# Patient Record
Sex: MALE | Race: WHITE | NOT HISPANIC OR LATINO | Employment: FULL TIME | URBAN - METROPOLITAN AREA
[De-identification: names, ages, dates, MRNs, and addresses within clinical notes are randomized per-mention and may not be internally consistent; named-entity substitution may affect disease eponyms.]

---

## 2017-03-09 ENCOUNTER — PATIENT OUTREACH (OUTPATIENT)
Dept: FAMILY MEDICINE CLINIC | Facility: CLINIC | Age: 52
End: 2017-03-09

## 2017-04-16 ENCOUNTER — RESULTS ENCOUNTER (OUTPATIENT)
Dept: FAMILY MEDICINE CLINIC | Facility: CLINIC | Age: 52
End: 2017-04-16

## 2017-04-16 DIAGNOSIS — E78.49 OTHER HYPERLIPIDEMIA: ICD-10-CM

## 2017-04-16 DIAGNOSIS — E11.9 CONTROLLED TYPE 2 DIABETES MELLITUS WITHOUT COMPLICATION, WITHOUT LONG-TERM CURRENT USE OF INSULIN (HCC): ICD-10-CM

## 2017-04-16 DIAGNOSIS — I10 ESSENTIAL HYPERTENSION: ICD-10-CM

## 2017-05-11 ENCOUNTER — OFFICE VISIT (OUTPATIENT)
Dept: FAMILY MEDICINE CLINIC | Facility: CLINIC | Age: 52
End: 2017-05-11

## 2017-05-11 VITALS
HEIGHT: 69 IN | BODY MASS INDEX: 40.58 KG/M2 | RESPIRATION RATE: 16 BRPM | DIASTOLIC BLOOD PRESSURE: 78 MMHG | TEMPERATURE: 98.1 F | HEART RATE: 77 BPM | WEIGHT: 274 LBS | SYSTOLIC BLOOD PRESSURE: 116 MMHG

## 2017-05-11 DIAGNOSIS — G89.29 CHRONIC PAIN OF RIGHT KNEE: Primary | ICD-10-CM

## 2017-05-11 DIAGNOSIS — M25.561 CHRONIC PAIN OF RIGHT KNEE: Primary | ICD-10-CM

## 2017-05-11 DIAGNOSIS — I10 ESSENTIAL HYPERTENSION: ICD-10-CM

## 2017-05-11 DIAGNOSIS — E78.49 OTHER HYPERLIPIDEMIA: ICD-10-CM

## 2017-05-11 DIAGNOSIS — E11.9 CONTROLLED TYPE 2 DIABETES MELLITUS WITHOUT COMPLICATION, WITHOUT LONG-TERM CURRENT USE OF INSULIN (HCC): ICD-10-CM

## 2017-05-11 PROCEDURE — 99214 OFFICE O/P EST MOD 30 MIN: CPT | Performed by: FAMILY MEDICINE

## 2017-05-11 RX ORDER — LEVOTHYROXINE SODIUM 175 UG/1
175 TABLET ORAL DAILY
Qty: 90 TABLET | Refills: 1 | Status: SHIPPED | OUTPATIENT
Start: 2017-05-11 | End: 2017-11-15 | Stop reason: SDUPTHER

## 2017-05-11 RX ORDER — IRBESARTAN 300 MG/1
300 TABLET ORAL DAILY
Qty: 90 TABLET | Refills: 1 | Status: SHIPPED | OUTPATIENT
Start: 2017-05-11 | End: 2017-11-15 | Stop reason: SDUPTHER

## 2017-05-11 RX ORDER — METFORMIN HYDROCHLORIDE 500 MG/1
2000 TABLET, EXTENDED RELEASE ORAL
Qty: 360 TABLET | Refills: 1 | Status: SHIPPED | OUTPATIENT
Start: 2017-05-11 | End: 2017-11-15 | Stop reason: SDUPTHER

## 2017-05-11 RX ORDER — PIOGLITAZONEHYDROCHLORIDE 45 MG/1
45 TABLET ORAL DAILY
Qty: 90 TABLET | Refills: 1 | Status: SHIPPED | OUTPATIENT
Start: 2017-05-11 | End: 2017-11-15 | Stop reason: SDUPTHER

## 2017-05-11 RX ORDER — AMLODIPINE BESYLATE 10 MG/1
10 TABLET ORAL DAILY
Qty: 90 TABLET | Refills: 1 | Status: SHIPPED | OUTPATIENT
Start: 2017-05-11 | End: 2017-11-15 | Stop reason: SDUPTHER

## 2017-05-11 RX ORDER — ATORVASTATIN CALCIUM 40 MG/1
40 TABLET, FILM COATED ORAL DAILY
Qty: 30 TABLET | Refills: 5 | Status: SHIPPED | OUTPATIENT
Start: 2017-05-11 | End: 2017-11-15 | Stop reason: SDUPTHER

## 2017-06-29 ENCOUNTER — OFFICE VISIT (OUTPATIENT)
Dept: ORTHOPEDIC SURGERY | Facility: CLINIC | Age: 52
End: 2017-06-29

## 2017-06-29 VITALS — WEIGHT: 255 LBS | HEIGHT: 69 IN | TEMPERATURE: 98.9 F | BODY MASS INDEX: 37.77 KG/M2

## 2017-06-29 DIAGNOSIS — S89.91XA KNEE INJURIES, RIGHT, INITIAL ENCOUNTER: Primary | ICD-10-CM

## 2017-06-29 DIAGNOSIS — S83.241A TEAR OF MEDIAL MENISCUS OF RIGHT KNEE, CURRENT, UNSPECIFIED TEAR TYPE, INITIAL ENCOUNTER: ICD-10-CM

## 2017-06-29 PROCEDURE — 73562 X-RAY EXAM OF KNEE 3: CPT | Performed by: ORTHOPAEDIC SURGERY

## 2017-06-29 PROCEDURE — 99244 OFF/OP CNSLTJ NEW/EST MOD 40: CPT | Performed by: ORTHOPAEDIC SURGERY

## 2017-06-29 NOTE — PROGRESS NOTES
"   New Patient Complaint      Patient: Hal Alexandre  YOB: 1965 51 y.o. male  Medical Record Number: [id number]    Chief Complaints: My knee hurts     History of Present Illness:  patient injured right knee 22 years ago when someone fell against his knee while playing basketball \"bending sideways\".  He was seen at [facility] at that time and told me that he had an MRI but is not sure of the results.  Since then he has had episodes that are very intermittent but when they occur are severe wear the right knee \"goes out on him and feels like something is stuck.  This is used associated with swelling grinding and aching with painful popping.  He treats this by wedging his foot under something and pushing hard on his knee until he feels a pop in it feels better.  This occurred several days ago again when getting up quickly to answer a phone.  He is seen today at the request of Dr. Elliott Lopez who is requested my opinion regarding etiology and treatment of this condition        HPI    Allergies: No Known Allergies    Medications:   Current Outpatient Prescriptions on File Prior to Visit   Medication Sig   • amLODIPine (NORVASC) 10 MG tablet Take 1 tablet by mouth Daily for 180 days.   • aspirin 81 MG tablet Take 81 mg by mouth daily.   • atorvastatin (LIPITOR) 40 MG tablet Take 1 tablet by mouth Daily for 180 days.   • irbesartan (AVAPRO) 300 MG tablet Take 1 tablet by mouth Daily for 180 days.   • levothyroxine (SYNTHROID, LEVOTHROID) 175 MCG tablet Take 1 tablet by mouth Daily for 180 days.   • metFORMIN ER (GLUCOPHAGE-XR) 500 MG 24 hr tablet Take 4 tablets by mouth Daily With Breakfast for 180 days.   • pioglitazone (ACTOS) 45 MG tablet Take 1 tablet by mouth Daily for 180 days.     No current facility-administered medications on file prior to visit.        Past Medical History:   Diagnosis Date   • Diabetes mellitus    • Disease of thyroid gland    • Hyperlipidemia    • Hypertension  " "    History reviewed. No pertinent surgical history.  Social History     Occupational History   • Not on file.     Social History Main Topics   • Smoking status: Former Smoker   • Smokeless tobacco: Never Used   • Alcohol use Yes   • Drug use: Not on file   • Sexual activity: Not on file      Social History     Social History Narrative     Family History   Problem Relation Age of Onset   • Diabetes Mother    • Cancer Father    • Cancer Brother        Review of Systems: 14 point review of systems performed, positive pertinent findings identified in HPI. All remaining systems negative     Review of Systems      Physical Exam:   Vitals:    06/29/17 1511   Temp: 98.9 °F (37.2 °C)   Weight: 255 lb (116 kg)   Height: 69\" (175.3 cm)     Physical Exam   Constitutional: pleasant, well developed   Eyes: sclera non icteric  Hearing : adequate for exam  Cardiovascular: palpable pulses in foot, calf/ thigh NT without sign of DVT  Respiratoy: breathing unlabored   Neurological: grossly sensate to LT throughout LE  Psychiatric: oriented with normal mood and affect.   Lymphatic: No palpable popliteal lymphadenopathy  Skin: intact throughout leg/foot  Musculoskeletal: Nonantalgic gait moderate effusion right knee.  Full extension 110° of flexion.  Grossly stable ligamentous exam but I did not appreciate firm endpoint on anterior drawer and didn't seem to have a trace pivot glide.  Moderate discomfort over the medial joint line exacerbated with painful popping on Hui's.  No patellar instability or apprehension  Physical Exam  Ortho Exam    Radiology: 3 views of the right knee ordered to evaluate knee pain reviewed and there are no prior x-rays available for comparison did not see any fracture or malalignment to the joint line or patellofemoral joint.    Assessment/Plan: Right knee pain of unclear etiology.  By history and exam most likely has an unstable meniscal tear and may have chronic ACL injury.  I offered him a knee sleeve " or hinged neoprene brace today which he declined.  We need to get an MRI of his right knee for further evaluation and he understands to call to schedule follow-up appointment once MRI has been scheduled.  In the interim he'll avoid kneeling squatting or twisting activities

## 2017-10-08 ENCOUNTER — RESULTS ENCOUNTER (OUTPATIENT)
Dept: FAMILY MEDICINE CLINIC | Facility: CLINIC | Age: 52
End: 2017-10-08

## 2017-10-08 DIAGNOSIS — E78.49 OTHER HYPERLIPIDEMIA: ICD-10-CM

## 2017-10-08 DIAGNOSIS — E11.9 CONTROLLED TYPE 2 DIABETES MELLITUS WITHOUT COMPLICATION, WITHOUT LONG-TERM CURRENT USE OF INSULIN (HCC): ICD-10-CM

## 2017-10-08 DIAGNOSIS — I10 ESSENTIAL HYPERTENSION: ICD-10-CM

## 2017-11-14 LAB
ALBUMIN SERPL-MCNC: 4.5 G/DL (ref 3.5–5.5)
ALBUMIN/GLOB SERPL: 1.6 {RATIO} (ref 1.2–2.2)
ALP SERPL-CCNC: 108 IU/L (ref 39–117)
ALT SERPL-CCNC: 34 IU/L (ref 0–44)
AST SERPL-CCNC: 17 IU/L (ref 0–40)
BASOPHILS # BLD AUTO: 0.1 X10E3/UL (ref 0–0.2)
BASOPHILS NFR BLD AUTO: 1 %
BILIRUB SERPL-MCNC: 0.3 MG/DL (ref 0–1.2)
BUN SERPL-MCNC: 14 MG/DL (ref 6–24)
BUN/CREAT SERPL: 13 (ref 9–20)
CALCIUM SERPL-MCNC: 9.5 MG/DL (ref 8.7–10.2)
CHLORIDE SERPL-SCNC: 102 MMOL/L (ref 96–106)
CHOLEST SERPL-MCNC: 214 MG/DL (ref 100–199)
CO2 SERPL-SCNC: 23 MMOL/L (ref 18–29)
CREAT SERPL-MCNC: 1.06 MG/DL (ref 0.76–1.27)
EOSINOPHIL # BLD AUTO: 0.4 X10E3/UL (ref 0–0.4)
EOSINOPHIL NFR BLD AUTO: 6 %
ERYTHROCYTE [DISTWIDTH] IN BLOOD BY AUTOMATED COUNT: 13.6 % (ref 12.3–15.4)
GFR SERPLBLD CREATININE-BSD FMLA CKD-EPI: 81 ML/MIN/1.73
GFR SERPLBLD CREATININE-BSD FMLA CKD-EPI: 93 ML/MIN/1.73
GLOBULIN SER CALC-MCNC: 2.9 G/DL (ref 1.5–4.5)
GLUCOSE SERPL-MCNC: 172 MG/DL (ref 65–99)
HBA1C MFR BLD: 6.9 % (ref 4.8–5.6)
HCT VFR BLD AUTO: 43.8 % (ref 37.5–51)
HDLC SERPL-MCNC: 49 MG/DL
HGB BLD-MCNC: 14.9 G/DL (ref 12.6–17.7)
IMM GRANULOCYTES # BLD: 0 X10E3/UL (ref 0–0.1)
IMM GRANULOCYTES NFR BLD: 0 %
LDLC SERPL CALC-MCNC: 126 MG/DL (ref 0–99)
LDLC/HDLC SERPL: 2.6 RATIO UNITS (ref 0–3.6)
LYMPHOCYTES # BLD AUTO: 2.5 X10E3/UL (ref 0.7–3.1)
LYMPHOCYTES NFR BLD AUTO: 32 %
MCH RBC QN AUTO: 28 PG (ref 26.6–33)
MCHC RBC AUTO-ENTMCNC: 34 G/DL (ref 31.5–35.7)
MCV RBC AUTO: 82 FL (ref 79–97)
MICROALBUMIN UR-MCNC: 69.2 UG/ML
MONOCYTES # BLD AUTO: 0.8 X10E3/UL (ref 0.1–0.9)
MONOCYTES NFR BLD AUTO: 11 %
NEUTROPHILS # BLD AUTO: 4 X10E3/UL (ref 1.4–7)
NEUTROPHILS NFR BLD AUTO: 50 %
PLATELET # BLD AUTO: 399 X10E3/UL (ref 150–379)
POTASSIUM SERPL-SCNC: 5.3 MMOL/L (ref 3.5–5.2)
PROT SERPL-MCNC: 7.4 G/DL (ref 6–8.5)
RBC # BLD AUTO: 5.32 X10E6/UL (ref 4.14–5.8)
SODIUM SERPL-SCNC: 140 MMOL/L (ref 134–144)
TRIGL SERPL-MCNC: 193 MG/DL (ref 0–149)
TSH SERPL DL<=0.005 MIU/L-ACNC: 2.62 UIU/ML (ref 0.45–4.5)
VLDLC SERPL CALC-MCNC: 39 MG/DL (ref 5–40)
WBC # BLD AUTO: 7.9 X10E3/UL (ref 3.4–10.8)

## 2017-11-15 ENCOUNTER — OFFICE VISIT (OUTPATIENT)
Dept: FAMILY MEDICINE CLINIC | Facility: CLINIC | Age: 52
End: 2017-11-15

## 2017-11-15 VITALS
DIASTOLIC BLOOD PRESSURE: 105 MMHG | SYSTOLIC BLOOD PRESSURE: 158 MMHG | HEART RATE: 86 BPM | RESPIRATION RATE: 18 BRPM | HEIGHT: 69 IN | OXYGEN SATURATION: 97 % | TEMPERATURE: 97.9 F

## 2017-11-15 DIAGNOSIS — I10 ESSENTIAL HYPERTENSION: ICD-10-CM

## 2017-11-15 DIAGNOSIS — E78.49 OTHER HYPERLIPIDEMIA: ICD-10-CM

## 2017-11-15 DIAGNOSIS — E11.9 CONTROLLED TYPE 2 DIABETES MELLITUS WITHOUT COMPLICATION, WITHOUT LONG-TERM CURRENT USE OF INSULIN (HCC): ICD-10-CM

## 2017-11-15 PROCEDURE — 90471 IMMUNIZATION ADMIN: CPT | Performed by: NURSE PRACTITIONER

## 2017-11-15 PROCEDURE — 99214 OFFICE O/P EST MOD 30 MIN: CPT | Performed by: NURSE PRACTITIONER

## 2017-11-15 PROCEDURE — 90686 IIV4 VACC NO PRSV 0.5 ML IM: CPT | Performed by: NURSE PRACTITIONER

## 2017-11-15 RX ORDER — METFORMIN HYDROCHLORIDE 500 MG/1
2000 TABLET, EXTENDED RELEASE ORAL
Qty: 360 TABLET | Refills: 1 | Status: SHIPPED | OUTPATIENT
Start: 2017-11-15 | End: 2018-05-15 | Stop reason: SDUPTHER

## 2017-11-15 RX ORDER — PIOGLITAZONEHYDROCHLORIDE 45 MG/1
45 TABLET ORAL DAILY
Qty: 90 TABLET | Refills: 1 | Status: SHIPPED | OUTPATIENT
Start: 2017-11-15 | End: 2018-05-15 | Stop reason: SDUPTHER

## 2017-11-15 RX ORDER — EZETIMIBE 10 MG/1
10 TABLET ORAL DAILY
Qty: 90 TABLET | Refills: 1 | Status: SHIPPED | OUTPATIENT
Start: 2017-11-15 | End: 2018-05-15 | Stop reason: ALTCHOICE

## 2017-11-15 RX ORDER — ATORVASTATIN CALCIUM 40 MG/1
40 TABLET, FILM COATED ORAL DAILY
Qty: 90 TABLET | Refills: 1 | Status: SHIPPED | OUTPATIENT
Start: 2017-11-15 | End: 2018-05-15 | Stop reason: SDUPTHER

## 2017-11-15 RX ORDER — IRBESARTAN 300 MG/1
300 TABLET ORAL DAILY
Qty: 90 TABLET | Refills: 1 | Status: SHIPPED | OUTPATIENT
Start: 2017-11-15 | End: 2018-05-15 | Stop reason: SDUPTHER

## 2017-11-15 RX ORDER — AMLODIPINE BESYLATE 10 MG/1
10 TABLET ORAL DAILY
Qty: 90 TABLET | Refills: 1 | Status: SHIPPED | OUTPATIENT
Start: 2017-11-15 | End: 2018-05-15 | Stop reason: SDUPTHER

## 2017-11-15 RX ORDER — LEVOTHYROXINE SODIUM 175 UG/1
175 TABLET ORAL DAILY
Qty: 90 TABLET | Refills: 1 | Status: SHIPPED | OUTPATIENT
Start: 2017-11-15 | End: 2018-05-15 | Stop reason: SDUPTHER

## 2017-11-15 NOTE — PROGRESS NOTES
"Subjective   Hal Alexandre is a 52 y.o. male.     History of Present Illness   Chief Complaint:   Chief Complaint   Patient presents with   • Hyperlipidemia     fill at [facility]   • Hypertension     med refill, send rest to [facility], review labs   • Hypothyroidism   • Diabetes   • Flu Vaccine       Hal Alexandre 52 y.o. male who presents today for Medical Management of the below listed issues and medication refills.  he has a problem list of   Patient Active Problem List   Diagnosis   • Essential hypertension   • Hyperlipidemia   • Acquired hypothyroidism   • Diabetes mellitus type 2, controlled   • Chronic pain of right knee   • Tear of medial meniscus of right knee, current   .  Since the last visit, he has overall felt well.  he has been compliant with   Current Outpatient Prescriptions:   •  amLODIPine (NORVASC) 10 MG tablet, Take 1 tablet by mouth Daily for 180 days., Disp: 90 tablet, Rfl: 1  •  aspirin 81 MG tablet, Take 81 mg by mouth daily., Disp: , Rfl:   •  atorvastatin (LIPITOR) 40 MG tablet, Take 1 tablet by mouth Daily for 180 days., Disp: 90 tablet, Rfl: 1  •  ezetimibe (ZETIA) 10 MG tablet, Take 1 tablet by mouth Daily., Disp: 90 tablet, Rfl: 1  •  irbesartan (AVAPRO) 300 MG tablet, Take 1 tablet by mouth Daily for 180 days., Disp: 90 tablet, Rfl: 1  •  levothyroxine (SYNTHROID, LEVOTHROID) 175 MCG tablet, Take 1 tablet by mouth Daily for 180 days., Disp: 90 tablet, Rfl: 1  •  metFORMIN ER (GLUCOPHAGE-XR) 500 MG 24 hr tablet, Take 4 tablets by mouth Daily With Breakfast for 180 days., Disp: 360 tablet, Rfl: 1  •  pioglitazone (ACTOS) 45 MG tablet, Take 1 tablet by mouth Daily for 180 days., Disp: 90 tablet, Rfl: 1.  he denies medication side effects.    All of the chronic condition(s) listed above are stable w/o issues.    BP (!) 158/105  Pulse 86  Temp 97.9 °F (36.6 °C)  Resp 18  Ht 69\" (175.3 cm)  SpO2 97%    Results for orders placed or performed in visit on 10/08/17   Comprehensive metabolic " panel   Result Value Ref Range    Glucose 172 (H) 65 - 99 mg/dL    BUN 14 6 - 24 mg/dL    Creatinine 1.06 0.76 - 1.27 mg/dL    eGFR Non African Am 81 >59 mL/min/1.73    eGFR African Am 93 >59 mL/min/1.73    BUN/Creatinine Ratio 13 9 - 20    Sodium 140 134 - 144 mmol/L    Potassium 5.3 (H) 3.5 - 5.2 mmol/L    Chloride 102 96 - 106 mmol/L    Total CO2 23 18 - 29 mmol/L    Calcium 9.5 8.7 - 10.2 mg/dL    Total Protein 7.4 6.0 - 8.5 g/dL    Albumin 4.5 3.5 - 5.5 g/dL    Globulin 2.9 1.5 - 4.5 g/dL    A/G Ratio 1.6 1.2 - 2.2    Total Bilirubin 0.3 0.0 - 1.2 mg/dL    Alkaline Phosphatase 108 39 - 117 IU/L    AST (SGOT) 17 0 - 40 IU/L    ALT (SGPT) 34 0 - 44 IU/L   Lipid Panel With LDL/HDL Ratio   Result Value Ref Range    Total Cholesterol 214 (H) 100 - 199 mg/dL    Triglycerides 193 (H) 0 - 149 mg/dL    HDL Cholesterol 49 >39 mg/dL    VLDL Cholesterol 39 5 - 40 mg/dL    LDL Cholesterol  126 (H) 0 - 99 mg/dL    LDL/HDL Ratio 2.6 0.0 - 3.6 ratio units   TSH   Result Value Ref Range    TSH 2.620 0.450 - 4.500 uIU/mL   Hemoglobin A1c   Result Value Ref Range    Hemoglobin A1C 6.9 (H) 4.8 - 5.6 %   MicroAlbumin, Urine, Random   Result Value Ref Range    Microalbumin, Urine 69.2 Not Estab. ug/mL   CBC and Differential   Result Value Ref Range    WBC 7.9 3.4 - 10.8 x10E3/uL    RBC 5.32 4.14 - 5.80 x10E6/uL    Hemoglobin 14.9 12.6 - 17.7 g/dL    Hematocrit 43.8 37.5 - 51.0 %    MCV 82 79 - 97 fL    MCH 28.0 26.6 - 33.0 pg    MCHC 34.0 31.5 - 35.7 g/dL    RDW 13.6 12.3 - 15.4 %    Platelets 399 (H) 150 - 379 x10E3/uL    Neutrophil Rel % 50 Not Estab. %    Lymphocyte Rel % 32 Not Estab. %    Monocyte Rel % 11 Not Estab. %    Eosinophil Rel % 6 Not Estab. %    Basophil Rel % 1 Not Estab. %    Neutrophils Absolute 4.0 1.4 - 7.0 x10E3/uL    Lymphocytes Absolute 2.5 0.7 - 3.1 x10E3/uL    Monocytes Absolute 0.8 0.1 - 0.9 x10E3/uL    Eosinophils Absolute 0.4 0.0 - 0.4 x10E3/uL    Basophils Absolute 0.1 0.0 - 0.2 x10E3/uL    Immature  Granulocyte Rel % 0 Not Estab. %    Immature Grans Absolute 0.0 0.0 - 0.1 x10E3/uL       Patient has been out of BP medication.  He has also gained weight as he had meniscus tear. Patient states he is to have surgery.  LDL has increased.  He has still been taking atorvastatin as prescribed and has not been out of medication.   The following portions of the patient's history were reviewed and updated as appropriate: allergies, current medications, past family history, past medical history, past social history, past surgical history and problem list.    Review of Systems   Constitutional: Negative for fatigue.   Eyes: Negative for visual disturbance.   Respiratory: Negative for cough and shortness of breath.    Cardiovascular: Negative for chest pain and palpitations.   Skin: Negative for rash.   Neurological: Negative for dizziness and headaches.   Psychiatric/Behavioral: Negative for dysphoric mood and sleep disturbance. The patient is not nervous/anxious.        Objective   Physical Exam   Constitutional: He is oriented to person, place, and time. He appears well-developed and well-nourished.   Neck: Carotid bruit is not present.   Cardiovascular: Normal rate and regular rhythm.    Pulmonary/Chest: Effort normal and breath sounds normal.   Neurological: He is oriented to person, place, and time.   Skin: Skin is warm and dry.   Psychiatric: He has a normal mood and affect. His behavior is normal. Judgment and thought content normal.   Nursing note and vitals reviewed.      Assessment/Plan   Hal was seen today for hyperlipidemia, hypertension, hypothyroidism, diabetes and flu vaccine.    Diagnoses and all orders for this visit:    Essential hypertension  -     amLODIPine (NORVASC) 10 MG tablet; Take 1 tablet by mouth Daily for 180 days.  -     irbesartan (AVAPRO) 300 MG tablet; Take 1 tablet by mouth Daily for 180 days.  -     metFORMIN ER (GLUCOPHAGE-XR) 500 MG 24 hr tablet; Take 4 tablets by mouth Daily With  Breakfast for 180 days.  -     pioglitazone (ACTOS) 45 MG tablet; Take 1 tablet by mouth Daily for 180 days.  -     levothyroxine (SYNTHROID, LEVOTHROID) 175 MCG tablet; Take 1 tablet by mouth Daily for 180 days.  -     ezetimibe (ZETIA) 10 MG tablet; Take 1 tablet by mouth Daily.    Other hyperlipidemia  -     amLODIPine (NORVASC) 10 MG tablet; Take 1 tablet by mouth Daily for 180 days.  -     irbesartan (AVAPRO) 300 MG tablet; Take 1 tablet by mouth Daily for 180 days.  -     metFORMIN ER (GLUCOPHAGE-XR) 500 MG 24 hr tablet; Take 4 tablets by mouth Daily With Breakfast for 180 days.  -     pioglitazone (ACTOS) 45 MG tablet; Take 1 tablet by mouth Daily for 180 days.  -     levothyroxine (SYNTHROID, LEVOTHROID) 175 MCG tablet; Take 1 tablet by mouth Daily for 180 days.  -     ezetimibe (ZETIA) 10 MG tablet; Take 1 tablet by mouth Daily.  -     atorvastatin (LIPITOR) 40 MG tablet; Take 1 tablet by mouth Daily for 180 days.    Controlled type 2 diabetes mellitus without complication, without long-term current use of insulin  -     amLODIPine (NORVASC) 10 MG tablet; Take 1 tablet by mouth Daily for 180 days.  -     irbesartan (AVAPRO) 300 MG tablet; Take 1 tablet by mouth Daily for 180 days.  -     metFORMIN ER (GLUCOPHAGE-XR) 500 MG 24 hr tablet; Take 4 tablets by mouth Daily With Breakfast for 180 days.  -     pioglitazone (ACTOS) 45 MG tablet; Take 1 tablet by mouth Daily for 180 days.  -     levothyroxine (SYNTHROID, LEVOTHROID) 175 MCG tablet; Take 1 tablet by mouth Daily for 180 days.  -     ezetimibe (ZETIA) 10 MG tablet; Take 1 tablet by mouth Daily.    Other orders  -     Flu Vaccine Quad PF 3YR+ (FLUARIX/FLUZONE [id number])    Added zetia for better control of LDL without increasing risk of myalgia.

## 2017-12-11 ENCOUNTER — TELEPHONE (OUTPATIENT)
Dept: FAMILY MEDICINE CLINIC | Facility: CLINIC | Age: 52
End: 2017-12-11

## 2017-12-11 DIAGNOSIS — E78.2 MIXED HYPERLIPIDEMIA: Primary | ICD-10-CM

## 2017-12-11 RX ORDER — CHOLESTYRAMINE LIGHT 4 G/5.7G
4 POWDER, FOR SUSPENSION ORAL 2 TIMES DAILY
Qty: 60 PACKET | Refills: 5 | Status: SHIPPED | OUTPATIENT
Start: 2017-12-11 | End: 2018-11-06

## 2017-12-11 NOTE — TELEPHONE ENCOUNTER
----- Message from SOFIA Spangler sent at 12/8/2017 12:56 PM EST -----  Regarding: zetia denial  zetia not covered even though patient is on atorvastatin and not at goal.  Need to change zetia to cholestyramine powder.

## 2017-12-18 ENCOUNTER — PRIOR AUTHORIZATION (OUTPATIENT)
Dept: FAMILY MEDICINE CLINIC | Facility: CLINIC | Age: 52
End: 2017-12-18

## 2017-12-18 NOTE — TELEPHONE ENCOUNTER
PA request for generic Zetia denied; Patient has to have one of the following: Dx of Homozygous Sitosterolemia; Dx of Mixed Hyperlipedmia; Have tried and failed statin within last 90 days; Appeal in process

## 2018-04-29 DIAGNOSIS — E78.49 OTHER HYPERLIPIDEMIA: ICD-10-CM

## 2018-04-30 RX ORDER — ATORVASTATIN CALCIUM 40 MG/1
TABLET, FILM COATED ORAL
Qty: 90 TABLET | Refills: 0 | OUTPATIENT
Start: 2018-04-30

## 2018-05-15 ENCOUNTER — OFFICE VISIT (OUTPATIENT)
Dept: FAMILY MEDICINE CLINIC | Facility: CLINIC | Age: 53
End: 2018-05-15

## 2018-05-15 VITALS
HEART RATE: 64 BPM | RESPIRATION RATE: 18 BRPM | SYSTOLIC BLOOD PRESSURE: 128 MMHG | TEMPERATURE: 97.9 F | WEIGHT: 255 LBS | DIASTOLIC BLOOD PRESSURE: 78 MMHG | OXYGEN SATURATION: 97 % | HEIGHT: 69 IN | BODY MASS INDEX: 37.77 KG/M2

## 2018-05-15 DIAGNOSIS — E11.9 CONTROLLED TYPE 2 DIABETES MELLITUS WITHOUT COMPLICATION, WITHOUT LONG-TERM CURRENT USE OF INSULIN (HCC): ICD-10-CM

## 2018-05-15 DIAGNOSIS — E78.49 OTHER HYPERLIPIDEMIA: ICD-10-CM

## 2018-05-15 DIAGNOSIS — Z12.11 ENCOUNTER FOR COLORECTAL CANCER SCREENING: Primary | ICD-10-CM

## 2018-05-15 DIAGNOSIS — Z12.12 ENCOUNTER FOR COLORECTAL CANCER SCREENING: Primary | ICD-10-CM

## 2018-05-15 DIAGNOSIS — I10 ESSENTIAL HYPERTENSION: ICD-10-CM

## 2018-05-15 PROCEDURE — 99214 OFFICE O/P EST MOD 30 MIN: CPT | Performed by: NURSE PRACTITIONER

## 2018-05-15 RX ORDER — METFORMIN HYDROCHLORIDE 500 MG/1
2000 TABLET, EXTENDED RELEASE ORAL
Qty: 360 TABLET | Refills: 1 | Status: SHIPPED | OUTPATIENT
Start: 2018-05-15 | End: 2018-11-06 | Stop reason: SDUPTHER

## 2018-05-15 RX ORDER — IRBESARTAN 300 MG/1
300 TABLET ORAL DAILY
Qty: 90 TABLET | Refills: 1 | Status: SHIPPED | OUTPATIENT
Start: 2018-05-15 | End: 2019-02-15 | Stop reason: SDUPTHER

## 2018-05-15 RX ORDER — ATORVASTATIN CALCIUM 40 MG/1
40 TABLET, FILM COATED ORAL DAILY
Qty: 90 TABLET | Refills: 1 | Status: SHIPPED | OUTPATIENT
Start: 2018-05-15 | End: 2018-11-06 | Stop reason: SDUPTHER

## 2018-05-15 RX ORDER — PIOGLITAZONEHYDROCHLORIDE 45 MG/1
45 TABLET ORAL DAILY
Qty: 90 TABLET | Refills: 1 | Status: SHIPPED | OUTPATIENT
Start: 2018-05-15 | End: 2018-11-06

## 2018-05-15 RX ORDER — LEVOTHYROXINE SODIUM 175 UG/1
175 TABLET ORAL DAILY
Qty: 90 TABLET | Refills: 1 | Status: SHIPPED | OUTPATIENT
Start: 2018-05-15 | End: 2018-11-06

## 2018-05-15 RX ORDER — AMLODIPINE BESYLATE 10 MG/1
10 TABLET ORAL DAILY
Qty: 90 TABLET | Refills: 1 | Status: SHIPPED | OUTPATIENT
Start: 2018-05-15 | End: 2018-11-06 | Stop reason: SDUPTHER

## 2018-05-15 NOTE — PROGRESS NOTES
Subjective   Hal Alexandre is a 52 y.o. male.     History of Present Illness   Hal Alexandre 52 y.o. male who presents today for routine follow up check and medication refills.  he has a history of   Patient Active Problem List   Diagnosis   • Essential hypertension   • Hyperlipidemia   • Acquired hypothyroidism   • Diabetes mellitus type 2, controlled   • Chronic pain of right knee   • Tear of medial meniscus of right knee, current   .  Since the last visit, he has overall felt well.  He has Hypertenision and is well controlled on medication, DMII and is well controlled on medication, Hyperlipidemia and is well controlled on medication and Hypothyroidism and is well controlled on Rx.  Labs are in desired treatment range.  he has been compliant with current medications have reviewed them.  The patient denies medication side effects.    Results for orders placed or performed in visit on 10/08/17   Comprehensive metabolic panel   Result Value Ref Range    Glucose 172 (H) 65 - 99 mg/dL    BUN 14 6 - 24 mg/dL    Creatinine 1.06 0.76 - 1.27 mg/dL    eGFR Non African Am 81 >59 mL/min/1.73    eGFR African Am 93 >59 mL/min/1.73    BUN/Creatinine Ratio 13 9 - 20    Sodium 140 134 - 144 mmol/L    Potassium 5.3 (H) 3.5 - 5.2 mmol/L    Chloride 102 96 - 106 mmol/L    Total CO2 23 18 - 29 mmol/L    Calcium 9.5 8.7 - 10.2 mg/dL    Total Protein 7.4 6.0 - 8.5 g/dL    Albumin 4.5 3.5 - 5.5 g/dL    Globulin 2.9 1.5 - 4.5 g/dL    A/G Ratio 1.6 1.2 - 2.2    Total Bilirubin 0.3 0.0 - 1.2 mg/dL    Alkaline Phosphatase 108 39 - 117 IU/L    AST (SGOT) 17 0 - 40 IU/L    ALT (SGPT) 34 0 - 44 IU/L   Lipid Panel With LDL/HDL Ratio   Result Value Ref Range    Total Cholesterol 214 (H) 100 - 199 mg/dL    Triglycerides 193 (H) 0 - 149 mg/dL    HDL Cholesterol 49 >39 mg/dL    VLDL Cholesterol 39 5 - 40 mg/dL    LDL Cholesterol  126 (H) 0 - 99 mg/dL    LDL/HDL Ratio 2.6 0.0 - 3.6 ratio units   TSH   Result Value Ref Range    TSH 2.620 0.450 -  4.500 uIU/mL   Hemoglobin A1c   Result Value Ref Range    Hemoglobin A1C 6.9 (H) 4.8 - 5.6 %   MicroAlbumin, Urine, Random   Result Value Ref Range    Microalbumin, Urine 69.2 Not Estab. ug/mL   CBC and Differential   Result Value Ref Range    WBC 7.9 3.4 - 10.8 x10E3/uL    RBC 5.32 4.14 - 5.80 x10E6/uL    Hemoglobin 14.9 12.6 - 17.7 g/dL    Hematocrit 43.8 37.5 - 51.0 %    MCV 82 79 - 97 fL    MCH 28.0 26.6 - 33.0 pg    MCHC 34.0 31.5 - 35.7 g/dL    RDW 13.6 12.3 - 15.4 %    Platelets 399 (H) 150 - 379 x10E3/uL    Neutrophil Rel % 50 Not Estab. %    Lymphocyte Rel % 32 Not Estab. %    Monocyte Rel % 11 Not Estab. %    Eosinophil Rel % 6 Not Estab. %    Basophil Rel % 1 Not Estab. %    Neutrophils Absolute 4.0 1.4 - 7.0 x10E3/uL    Lymphocytes Absolute 2.5 0.7 - 3.1 x10E3/uL    Monocytes Absolute 0.8 0.1 - 0.9 x10E3/uL    Eosinophils Absolute 0.4 0.0 - 0.4 x10E3/uL    Basophils Absolute 0.1 0.0 - 0.2 x10E3/uL    Immature Granulocyte Rel % 0 Not Estab. %    Immature Grans Absolute 0.0 0.0 - 0.1 x10E3/uL     Due for labs.  Will get done this week.   The following portions of the patient's history were reviewed and updated as appropriate: allergies, current medications, past family history, past medical history, past social history, past surgical history and problem list.    Review of Systems   Constitutional: Negative for fatigue.   Respiratory: Negative for cough and shortness of breath.    Cardiovascular: Negative for chest pain and palpitations.   Endocrine: Negative for cold intolerance, heat intolerance, polydipsia, polyphagia and polyuria.   Skin: Negative for rash.   Psychiatric/Behavioral: Negative for dysphoric mood and sleep disturbance. The patient is not nervous/anxious.        Objective   Physical Exam   Constitutional: He is oriented to person, place, and time. He appears well-developed and well-nourished.   Neck: Carotid bruit is not present.   Cardiovascular: Normal rate and regular rhythm.     Pulmonary/Chest: Effort normal and breath sounds normal.   Neurological: He is oriented to person, place, and time.   Skin: Skin is warm and dry.   Psychiatric: He has a normal mood and affect. His behavior is normal. Judgment and thought content normal.   Nursing note and vitals reviewed.      Assessment/Plan   Problems Addressed this Visit        Cardiovascular and Mediastinum    Essential hypertension    Relevant Medications    atorvastatin (LIPITOR) 40 MG tablet    amLODIPine (NORVASC) 10 MG tablet    irbesartan (AVAPRO) 300 MG tablet    levothyroxine (SYNTHROID, LEVOTHROID) 175 MCG tablet    metFORMIN ER (GLUCOPHAGE-XR) 500 MG 24 hr tablet    pioglitazone (ACTOS) 45 MG tablet    Other Relevant Orders    Comprehensive metabolic panel    Lipid panel    CBC and Differential    TSH    Hemoglobin A1c    MicroAlbumin, Urine, Random - Urine, Clean Catch    Hyperlipidemia    Relevant Medications    atorvastatin (LIPITOR) 40 MG tablet    amLODIPine (NORVASC) 10 MG tablet    irbesartan (AVAPRO) 300 MG tablet    levothyroxine (SYNTHROID, LEVOTHROID) 175 MCG tablet    metFORMIN ER (GLUCOPHAGE-XR) 500 MG 24 hr tablet    pioglitazone (ACTOS) 45 MG tablet    Other Relevant Orders    Comprehensive metabolic panel    Lipid panel    CBC and Differential    TSH    Hemoglobin A1c    MicroAlbumin, Urine, Random - Urine, Clean Catch       Endocrine    Diabetes mellitus type 2, controlled    Relevant Medications    atorvastatin (LIPITOR) 40 MG tablet    amLODIPine (NORVASC) 10 MG tablet    irbesartan (AVAPRO) 300 MG tablet    levothyroxine (SYNTHROID, LEVOTHROID) 175 MCG tablet    metFORMIN ER (GLUCOPHAGE-XR) 500 MG 24 hr tablet    pioglitazone (ACTOS) 45 MG tablet    Other Relevant Orders    Comprehensive metabolic panel    Lipid panel    CBC and Differential    TSH    Hemoglobin A1c    MicroAlbumin, Urine, Random - Urine, Clean Catch      Other Visit Diagnoses     Encounter for colorectal cancer screening    -  Primary     Relevant Orders    Ambulatory Referral For Screening Colonoscopy

## 2018-11-03 LAB
ALBUMIN SERPL-MCNC: 4.2 G/DL (ref 3.5–5.5)
ALBUMIN/GLOB SERPL: 1.4 {RATIO} (ref 1.2–2.2)
ALP SERPL-CCNC: 108 IU/L (ref 39–117)
ALT SERPL-CCNC: 25 IU/L (ref 0–44)
AST SERPL-CCNC: 17 IU/L (ref 0–40)
BASOPHILS # BLD AUTO: 0 X10E3/UL (ref 0–0.2)
BASOPHILS NFR BLD AUTO: 1 %
BILIRUB SERPL-MCNC: 0.4 MG/DL (ref 0–1.2)
BUN SERPL-MCNC: 16 MG/DL (ref 6–24)
BUN/CREAT SERPL: 15 (ref 9–20)
CALCIUM SERPL-MCNC: 9.9 MG/DL (ref 8.7–10.2)
CHLORIDE SERPL-SCNC: 105 MMOL/L (ref 96–106)
CHOLEST SERPL-MCNC: 147 MG/DL (ref 100–199)
CO2 SERPL-SCNC: 23 MMOL/L (ref 20–29)
CREAT SERPL-MCNC: 1.09 MG/DL (ref 0.76–1.27)
EOSINOPHIL # BLD AUTO: 0.3 X10E3/UL (ref 0–0.4)
EOSINOPHIL NFR BLD AUTO: 5 %
ERYTHROCYTE [DISTWIDTH] IN BLOOD BY AUTOMATED COUNT: 14.1 % (ref 12.3–15.4)
GLOBULIN SER CALC-MCNC: 3 G/DL (ref 1.5–4.5)
GLUCOSE SERPL-MCNC: 110 MG/DL (ref 65–99)
HBA1C MFR BLD: 5.9 % (ref 4.8–5.6)
HCT VFR BLD AUTO: 46.1 % (ref 37.5–51)
HDLC SERPL-MCNC: 37 MG/DL
HGB BLD-MCNC: 15 G/DL (ref 13–17.7)
IMM GRANULOCYTES # BLD: 0 X10E3/UL (ref 0–0.1)
IMM GRANULOCYTES NFR BLD: 0 %
LDLC SERPL CALC-MCNC: 80 MG/DL (ref 0–99)
LYMPHOCYTES # BLD AUTO: 2.2 X10E3/UL (ref 0.7–3.1)
LYMPHOCYTES NFR BLD AUTO: 33 %
MCH RBC QN AUTO: 28.2 PG (ref 26.6–33)
MCHC RBC AUTO-ENTMCNC: 32.5 G/DL (ref 31.5–35.7)
MCV RBC AUTO: 87 FL (ref 79–97)
MICROALBUMIN UR-MCNC: 6.6 UG/ML
MONOCYTES # BLD AUTO: 0.7 X10E3/UL (ref 0.1–0.9)
MONOCYTES NFR BLD AUTO: 11 %
NEUTROPHILS # BLD AUTO: 3.2 X10E3/UL (ref 1.4–7)
NEUTROPHILS NFR BLD AUTO: 50 %
PLATELET # BLD AUTO: 351 X10E3/UL (ref 150–379)
POTASSIUM SERPL-SCNC: 5.2 MMOL/L (ref 3.5–5.2)
PROT SERPL-MCNC: 7.2 G/DL (ref 6–8.5)
RBC # BLD AUTO: 5.32 X10E6/UL (ref 4.14–5.8)
SODIUM SERPL-SCNC: 143 MMOL/L (ref 134–144)
TRIGL SERPL-MCNC: 148 MG/DL (ref 0–149)
TSH SERPL DL<=0.005 MIU/L-ACNC: 0.39 UIU/ML (ref 0.45–4.5)
VLDLC SERPL CALC-MCNC: 30 MG/DL (ref 5–40)
WBC # BLD AUTO: 6.6 X10E3/UL (ref 3.4–10.8)

## 2018-11-06 ENCOUNTER — OFFICE VISIT (OUTPATIENT)
Dept: FAMILY MEDICINE CLINIC | Facility: CLINIC | Age: 53
End: 2018-11-06

## 2018-11-06 VITALS
DIASTOLIC BLOOD PRESSURE: 72 MMHG | WEIGHT: 256 LBS | HEART RATE: 61 BPM | SYSTOLIC BLOOD PRESSURE: 110 MMHG | OXYGEN SATURATION: 96 % | BODY MASS INDEX: 37.92 KG/M2 | TEMPERATURE: 98.4 F | RESPIRATION RATE: 16 BRPM | HEIGHT: 69 IN

## 2018-11-06 DIAGNOSIS — I10 ESSENTIAL HYPERTENSION: ICD-10-CM

## 2018-11-06 DIAGNOSIS — E11.9 CONTROLLED TYPE 2 DIABETES MELLITUS WITHOUT COMPLICATION, WITHOUT LONG-TERM CURRENT USE OF INSULIN (HCC): ICD-10-CM

## 2018-11-06 DIAGNOSIS — Z23 IMMUNIZATION DUE: ICD-10-CM

## 2018-11-06 DIAGNOSIS — Z12.5 SCREENING PSA (PROSTATE SPECIFIC ANTIGEN): ICD-10-CM

## 2018-11-06 DIAGNOSIS — E78.49 OTHER HYPERLIPIDEMIA: ICD-10-CM

## 2018-11-06 DIAGNOSIS — E11.9 CONTROLLED TYPE 2 DIABETES MELLITUS WITHOUT COMPLICATION, WITH LONG-TERM CURRENT USE OF INSULIN (HCC): Primary | ICD-10-CM

## 2018-11-06 DIAGNOSIS — Z79.4 CONTROLLED TYPE 2 DIABETES MELLITUS WITHOUT COMPLICATION, WITH LONG-TERM CURRENT USE OF INSULIN (HCC): Primary | ICD-10-CM

## 2018-11-06 DIAGNOSIS — Z12.11 COLON CANCER SCREENING: ICD-10-CM

## 2018-11-06 DIAGNOSIS — E03.9 ACQUIRED HYPOTHYROIDISM: ICD-10-CM

## 2018-11-06 DIAGNOSIS — E78.2 MIXED HYPERLIPIDEMIA: ICD-10-CM

## 2018-11-06 LAB
Lab: NORMAL
T3 SERPL-MCNC: 106 NG/DL (ref 71–180)
T4 FREE SERPL-MCNC: 1.87 NG/DL (ref 0.82–1.77)
WRITTEN AUTHORIZATION: NORMAL

## 2018-11-06 PROCEDURE — 99214 OFFICE O/P EST MOD 30 MIN: CPT | Performed by: PHYSICIAN ASSISTANT

## 2018-11-06 PROCEDURE — 90471 IMMUNIZATION ADMIN: CPT | Performed by: PHYSICIAN ASSISTANT

## 2018-11-06 PROCEDURE — 90674 CCIIV4 VAC NO PRSV 0.5 ML IM: CPT | Performed by: PHYSICIAN ASSISTANT

## 2018-11-06 RX ORDER — LEVOTHYROXINE SODIUM 0.15 MG/1
150 TABLET ORAL DAILY
Qty: 90 TABLET | Refills: 3 | Status: SHIPPED | OUTPATIENT
Start: 2018-11-06 | End: 2019-11-19 | Stop reason: SDUPTHER

## 2018-11-06 RX ORDER — METFORMIN HYDROCHLORIDE 500 MG/1
2000 TABLET, EXTENDED RELEASE ORAL DAILY
Qty: 360 TABLET | Refills: 1 | Status: SHIPPED | OUTPATIENT
Start: 2018-11-06 | End: 2019-05-02 | Stop reason: SDUPTHER

## 2018-11-06 RX ORDER — PIOGLITAZONEHYDROCHLORIDE 15 MG/1
15 TABLET ORAL DAILY
Qty: 90 TABLET | Refills: 3 | Status: SHIPPED | OUTPATIENT
Start: 2018-11-06 | End: 2019-11-19 | Stop reason: SDUPTHER

## 2018-11-06 RX ORDER — ATORVASTATIN CALCIUM 40 MG/1
40 TABLET, FILM COATED ORAL DAILY
Qty: 90 TABLET | Refills: 1 | Status: SHIPPED | OUTPATIENT
Start: 2018-11-06 | End: 2019-05-02 | Stop reason: SDUPTHER

## 2018-11-06 RX ORDER — AMLODIPINE BESYLATE 10 MG/1
10 TABLET ORAL DAILY
Qty: 90 TABLET | Refills: 1 | Status: SHIPPED | OUTPATIENT
Start: 2018-11-06 | End: 2019-05-02 | Stop reason: SDUPTHER

## 2018-11-06 NOTE — PATIENT INSTRUCTIONS
Labs 3mos  appt 6mos  Low glycemic index diet  Exercise 30 minutes most days of the week  Make sure you get results on any labs or tests we ordered today  We discussed medications and how to take them as prescribed  Sleep 6-8 hours each night if possible  If you have not signed up for MyChart, please activate your code ASAP from your After Visit Summary today    LDL goal <100  LDL goal if heart disease <70  HDL goal >60  Triglyceride goal <150  BP goal =<130/80  Fasting glucose <100

## 2018-11-06 NOTE — PROGRESS NOTES
Subjective   Hal Alexandre is a 53 y.o. male.     History of Present Illness     Hal Alexandre 53 y.o. male who presents today for routine follow up check and medication refills.  he has a history of   Patient Active Problem List   Diagnosis   • Essential hypertension   • Hyperlipidemia   • Acquired hypothyroidism   • Controlled type 2 diabetes mellitus without complication, with long-term current use of insulin (CMS/[facility])   • Chronic pain of right knee   • Tear of medial meniscus of right knee, current   .  Since the last visit, he has overall felt well.  He has Hypertenision and is well controlled on medication, DMII and is well controlled on medication and Hypothyroidism and here to discuss abnormal lab results.  he has been compliant with current medications have reviewed them.  The patient denies medication side effects.  Weight was 296  Results for orders placed or performed in visit on 05/15/18   Comprehensive metabolic panel   Result Value Ref Range    Glucose 110 (H) 65 - 99 mg/dL    BUN 16 6 - 24 mg/dL    Creatinine 1.09 0.76 - 1.27 mg/dL    eGFR Non African Am 78 >59 mL/min/1.73    eGFR African Am 90 >59 mL/min/1.73    BUN/Creatinine Ratio 15 9 - 20    Sodium 143 134 - 144 mmol/L    Potassium 5.2 3.5 - 5.2 mmol/L    Chloride 105 96 - 106 mmol/L    Total CO2 23 20 - 29 mmol/L    Calcium 9.9 8.7 - 10.2 mg/dL    Total Protein 7.2 6.0 - 8.5 g/dL    Albumin 4.2 3.5 - 5.5 g/dL    Globulin 3.0 1.5 - 4.5 g/dL    A/G Ratio 1.4 1.2 - 2.2    Total Bilirubin 0.4 0.0 - 1.2 mg/dL    Alkaline Phosphatase 108 39 - 117 IU/L    AST (SGOT) 17 0 - 40 IU/L    ALT (SGPT) 25 0 - 44 IU/L   Lipid panel   Result Value Ref Range    Total Cholesterol 147 100 - 199 mg/dL    Triglycerides 148 0 - 149 mg/dL    HDL Cholesterol 37 (L) >39 mg/dL    VLDL Cholesterol 30 5 - 40 mg/dL    LDL Cholesterol  80 0 - 99 mg/dL   TSH   Result Value Ref Range    TSH 0.385 (L) 0.450 - 4.500 uIU/mL   Hemoglobin A1c   Result Value Ref Range    Hemoglobin  A1C 5.9 (H) 4.8 - 5.6 %   MicroAlbumin, Urine, Random - Urine, Clean Catch   Result Value Ref Range    Microalbumin, Urine 6.6 Not Estab. ug/mL   T4, Free   Result Value Ref Range    Free T4 1.87 (H) 0.82 - 1.77 ng/dL   T3   Result Value Ref Range    T3, Total 106 71 - 180 ng/dL   Please Note   Result Value Ref Range    Please note Comment    Written Authorization   Result Value Ref Range    Written Authorization Comment    CBC and Differential   Result Value Ref Range    WBC 6.6 3.4 - 10.8 x10E3/uL    RBC 5.32 4.14 - 5.80 x10E6/uL    Hemoglobin 15.0 13.0 - 17.7 g/dL    Hematocrit 46.1 37.5 - 51.0 %    MCV 87 79 - 97 fL    MCH 28.2 26.6 - 33.0 pg    MCHC 32.5 31.5 - 35.7 g/dL    RDW 14.1 12.3 - 15.4 %    Platelets 351 150 - 379 x10E3/uL    Neutrophil Rel % 50 Not Estab. %    Lymphocyte Rel % 33 Not Estab. %    Monocyte Rel % 11 Not Estab. %    Eosinophil Rel % 5 Not Estab. %    Basophil Rel % 1 Not Estab. %    Neutrophils Absolute 3.2 1.4 - 7.0 x10E3/uL    Lymphocytes Absolute 2.2 0.7 - 3.1 x10E3/uL    Monocytes Absolute 0.7 0.1 - 0.9 x10E3/uL    Eosinophils Absolute 0.3 0.0 - 0.4 x10E3/uL    Basophils Absolute 0.0 0.0 - 0.2 x10E3/uL    Immature Granulocyte Rel % 0 Not Estab. %    Immature Grans Absolute 0.0 0.0 - 0.1 x10E3/uL     Brother prostate cancer  Needs colonoscopy and making order  Need to exercise more    Down about 65 lbs and watching diet  I will lower thyroid dose and let him go down on Actos with his weight loss and see what 15 mg does;  Wants to see if   Labs for these and PSA in 3mos    The following portions of the patient's history were reviewed and updated as appropriate: allergies, current medications, past family history, past medical history, past social history, past surgical history and problem list.    Review of Systems   Constitutional: Negative for activity change, appetite change and unexpected weight change.   HENT: Negative for nosebleeds and trouble swallowing.    Eyes: Negative for  pain and visual disturbance.   Respiratory: Negative for chest tightness, shortness of breath and wheezing.    Cardiovascular: Negative for chest pain and palpitations.   Gastrointestinal: Negative for abdominal pain and blood in stool.   Endocrine: Negative.    Genitourinary: Negative for difficulty urinating and hematuria.   Musculoskeletal: Negative for joint swelling.   Skin: Negative for color change and rash.   Allergic/Immunologic: Negative.    Neurological: Negative for syncope and speech difficulty.   Hematological: Negative for adenopathy.   Psychiatric/Behavioral: Negative for agitation and confusion.   All other systems reviewed and are negative.      Objective   Physical Exam   Constitutional: He is oriented to person, place, and time. He appears well-developed and well-nourished. No distress.   HENT:   Head: Normocephalic and atraumatic.   Eyes: Pupils are equal, round, and reactive to light. Conjunctivae and EOM are normal. Right eye exhibits no discharge. Left eye exhibits no discharge. No scleral icterus.   Neck: Normal range of motion. Neck supple. No tracheal deviation present. No thyromegaly present.   Cardiovascular: Normal rate, regular rhythm, normal heart sounds, intact distal pulses and normal pulses.  Exam reveals no gallop.    No murmur heard.  Trace pedal edema = bilat     Pulmonary/Chest: Effort normal and breath sounds normal. No respiratory distress. He has no wheezes. He has no rales.   Musculoskeletal: Normal range of motion.    Hal had a diabetic foot exam performed today.   During the foot exam he had a monofilament test performed.    Neurological Sensory Findings - Unaltered hot/cold right ankle/foot discrimination and unaltered hot/cold left ankle/foot discrimination. Unaltered sharp/dull right ankle/foot discrimination and unaltered sharp/dull left ankle/foot discrimination. No right ankle/foot altered proprioception and no left ankle/foot altered proprioception  Vascular  Status -  His right foot exhibits normal foot vasculature . His left foot exhibits normal foot vasculature .  Skin Integrity  -  His right foot skin is intact.  He has no right foot onychomycosis, no right foot ulcer, non-callous right foot, no ingrown toenail on right foot, right heel is not dry and cracked, no right foot warmth, no right foot blister and no right foot gangrenous changes.His left foot skin is intact. He has no left foot onychomycosis, no left foot ulcer, non-callous left foot, no left ingrown toenail, no left heel dry and cracked, no left foot warmth, no left foot blister and no left foot gangrenous changes..     Neurological: He is alert and oriented to person, place, and time. He exhibits normal muscle tone. Coordination normal.   Skin: Skin is warm. No rash noted. No erythema. No pallor.   Psychiatric: He has a normal mood and affect. His behavior is normal. Judgment and thought content normal.   Nursing note and vitals reviewed.      Assessment/Plan   Problems Addressed this Visit        Cardiovascular and Mediastinum    Essential hypertension    Relevant Medications    metFORMIN ER (GLUCOPHAGE-XR) 500 MG 24 hr tablet    atorvastatin (LIPITOR) 40 MG tablet    amLODIPine (NORVASC) 10 MG tablet    Other Relevant Orders    PSA Screen    Comprehensive Metabolic Panel    Hemoglobin A1c    T3, Free    T4, Free    TSH    Vitamin B12    Folate    Vitamin D 25 Hydroxy    Hyperlipidemia    Relevant Medications    metFORMIN ER (GLUCOPHAGE-XR) 500 MG 24 hr tablet    atorvastatin (LIPITOR) 40 MG tablet    amLODIPine (NORVASC) 10 MG tablet    Other Relevant Orders    PSA Screen    Comprehensive Metabolic Panel    Hemoglobin A1c    T3, Free    T4, Free    TSH    Vitamin B12    Folate    Vitamin D 25 Hydroxy    PSA Screen    Comprehensive Metabolic Panel    Hemoglobin A1c    T3, Free    T4, Free    TSH    Vitamin B12    Folate    Vitamin D 25 Hydroxy       Endocrine    Acquired hypothyroidism    Relevant  Medications    levothyroxine (SYNTHROID) 150 MCG tablet    Other Relevant Orders    PSA Screen    Comprehensive Metabolic Panel    Hemoglobin A1c    T3, Free    T4, Free    TSH    Vitamin B12    Folate    Vitamin D 25 Hydroxy    Controlled type 2 diabetes mellitus without complication, with long-term current use of insulin (CMS/HCC) - Primary    Relevant Medications    pioglitazone (ACTOS) 15 MG tablet    metFORMIN ER (GLUCOPHAGE-XR) 500 MG 24 hr tablet    atorvastatin (LIPITOR) 40 MG tablet    amLODIPine (NORVASC) 10 MG tablet    Other Relevant Orders    PSA Screen    Comprehensive Metabolic Panel    Hemoglobin A1c    T3, Free    T4, Free    TSH    Vitamin B12    Folate    Vitamin D 25 Hydroxy      Other Visit Diagnoses     Immunization due        Relevant Orders    Flucelvax Quad=>4Years (PFS) (Completed)    Colon cancer screening        Relevant Orders    Ambulatory Referral to Gastroenterology    PSA Screen    Comprehensive Metabolic Panel    Hemoglobin A1c    T3, Free    T4, Free    TSH    Vitamin B12    Folate    Vitamin D 25 Hydroxy    Controlled type 2 diabetes mellitus without complication, without long-term current use of insulin (CMS/HCC)        Relevant Medications    pioglitazone (ACTOS) 15 MG tablet    metFORMIN ER (GLUCOPHAGE-XR) 500 MG 24 hr tablet    atorvastatin (LIPITOR) 40 MG tablet    amLODIPine (NORVASC) 10 MG tablet    Other Relevant Orders    PSA Screen    Comprehensive Metabolic Panel    Hemoglobin A1c    T3, Free    T4, Free    TSH    Vitamin B12    Folate    Vitamin D 25 Hydroxy    Screening PSA (prostate specific antigen)        Relevant Orders    PSA Screen    Comprehensive Metabolic Panel    Hemoglobin A1c    T3, Free    T4, Free    TSH    Vitamin B12    Folate    Vitamin D 25 Hydroxy

## 2018-12-13 ENCOUNTER — PREP FOR SURGERY (OUTPATIENT)
Dept: OTHER | Facility: HOSPITAL | Age: 53
End: 2018-12-13

## 2018-12-13 DIAGNOSIS — Z12.11 SCREENING FOR MALIGNANT NEOPLASM OF COLON: Primary | ICD-10-CM

## 2018-12-13 RX ORDER — SODIUM CHLORIDE, SODIUM LACTATE, POTASSIUM CHLORIDE, CALCIUM CHLORIDE 600; 310; 30; 20 MG/100ML; MG/100ML; MG/100ML; MG/100ML
30 INJECTION, SOLUTION INTRAVENOUS CONTINUOUS
Status: CANCELLED | OUTPATIENT
Start: 2019-02-04

## 2019-01-07 ENCOUNTER — OFFICE VISIT (OUTPATIENT)
Dept: FAMILY MEDICINE CLINIC | Facility: CLINIC | Age: 54
End: 2019-01-07

## 2019-01-07 VITALS
HEART RATE: 76 BPM | HEIGHT: 69 IN | DIASTOLIC BLOOD PRESSURE: 80 MMHG | OXYGEN SATURATION: 97 % | RESPIRATION RATE: 16 BRPM | BODY MASS INDEX: 37.8 KG/M2 | SYSTOLIC BLOOD PRESSURE: 114 MMHG | TEMPERATURE: 97.6 F

## 2019-01-07 DIAGNOSIS — M79.645 PAIN OF FINGER OF LEFT HAND: Primary | ICD-10-CM

## 2019-01-07 PROBLEM — Z12.11 SCREENING FOR MALIGNANT NEOPLASM OF COLON: Status: ACTIVE | Noted: 2019-01-07

## 2019-01-07 PROCEDURE — 73140 X-RAY EXAM OF FINGER(S): CPT | Performed by: PHYSICIAN ASSISTANT

## 2019-01-07 PROCEDURE — 99213 OFFICE O/P EST LOW 20 MIN: CPT | Performed by: PHYSICIAN ASSISTANT

## 2019-01-07 RX ORDER — MELOXICAM 15 MG/1
15 TABLET ORAL DAILY
Qty: 30 TABLET | Refills: 2 | Status: SHIPPED | OUTPATIENT
Start: 2019-01-07 | End: 2019-05-02

## 2019-01-07 NOTE — PROGRESS NOTES
Subjective   Hal Alexandre is a 53 y.o. male.     History of Present Illness   Patient complains of left middle finger pain. The symptoms began 4-5 weeks.  Pain is a result of no known event. Pain is located left third finger PIP region. Discomfort is described as aching and stiffness. Symptoms are exacerbated by movement and more pain in AM; splints finger at night.  Evaluation to date: none. Therapy to date includes: nothing specific    Edema of the PIP also; sore to touch; not red  I will get Xray and refer to hand doc  Lab Results   Component Value Date    BUN 16 11/02/2018    CREATININE 1.09 11/02/2018    EGFRIFNONA 78 11/02/2018    EGFRIFAFRI 90 11/02/2018    BCR 15 11/02/2018    K 5.2 11/02/2018    CO2 23 11/02/2018    CALCIUM 9.9 11/02/2018    PROTENTOTREF 7.2 11/02/2018    ALBUMIN 4.2 11/02/2018    LABIL2 1.4 11/02/2018    AST 17 11/02/2018    ALT 25 11/02/2018     X-Ray  Interpretation report in house X-rays that I personally viewed    Relevant Clinical Issues/Diagnoses/Indications:  3rd finger pain        Clinical Findings:  No fx or bone erosion          Comparative Data:  none          Date of Previous X-ray:    Change on current X-ray:    Stop NSAID if GI upset or any signs tarry or blood in stools    Splint as need    The following portions of the patient's history were reviewed and updated as appropriate: allergies, current medications, past family history, past medical history, past social history, past surgical history and problem list.    Review of Systems   Constitutional: Negative for activity change, appetite change and unexpected weight change.   HENT: Negative for nosebleeds and trouble swallowing.    Eyes: Negative for pain and visual disturbance.   Respiratory: Negative for chest tightness, shortness of breath and wheezing.    Cardiovascular: Negative for chest pain and palpitations.   Gastrointestinal: Negative for abdominal pain and blood in stool.   Endocrine: Negative.    Genitourinary:  Negative for difficulty urinating and hematuria.   Musculoskeletal: Positive for arthralgias. Negative for joint swelling.   Skin: Negative for color change and rash.   Allergic/Immunologic: Negative.    Neurological: Negative for syncope and speech difficulty.   Hematological: Negative for adenopathy.   Psychiatric/Behavioral: Negative for agitation and confusion.   All other systems reviewed and are negative.      Objective   Physical Exam   Constitutional: He is oriented to person, place, and time. He appears well-developed and well-nourished. No distress.   HENT:   Head: Normocephalic.   Eyes: EOM are normal. Pupils are equal, round, and reactive to light.   Cardiovascular:   No murmur heard.  Pulmonary/Chest: Effort normal.   Musculoskeletal: He exhibits edema and tenderness. He exhibits no deformity.   Left DIP 3rd finger sore and edema; limited ROM d/t pain; no palp mass; pulses 2+=   Neurological: He is alert and oriented to person, place, and time.   Skin: He is not diaphoretic.   Psychiatric: He has a normal mood and affect. His behavior is normal. Judgment and thought content normal. His affect is not inappropriate. He is not actively hallucinating. Cognition and memory are normal.   Nursing note and vitals reviewed.      Assessment/Plan   Hal was seen today for hand pain.    Diagnoses and all orders for this visit:    Pain of finger of left hand  -     XR Finger 2+ View Left (In Office)  -     Ambulatory Referral to Hand Surgery    Other orders  -     meloxicam (MOBIC) 15 MG tablet; Take 1 tablet by mouth Daily. For pain with food (stop if GI upset)

## 2019-02-04 ENCOUNTER — ANESTHESIA EVENT (OUTPATIENT)
Dept: GASTROENTEROLOGY | Facility: HOSPITAL | Age: 54
End: 2019-02-04

## 2019-02-04 ENCOUNTER — HOSPITAL ENCOUNTER (OUTPATIENT)
Facility: HOSPITAL | Age: 54
Setting detail: HOSPITAL OUTPATIENT SURGERY
Discharge: HOME | End: 2019-02-04
Attending: INTERNAL MEDICINE | Admitting: INTERNAL MEDICINE

## 2019-02-04 ENCOUNTER — ANESTHESIA (OUTPATIENT)
Dept: GASTROENTEROLOGY | Facility: HOSPITAL | Age: 54
End: 2019-02-04

## 2019-02-04 VITALS
TEMPERATURE: 97.6 F | BODY MASS INDEX: 36.49 KG/M2 | WEIGHT: 246.38 LBS | SYSTOLIC BLOOD PRESSURE: 115 MMHG | RESPIRATION RATE: 20 BRPM | OXYGEN SATURATION: 93 % | HEART RATE: 67 BPM | DIASTOLIC BLOOD PRESSURE: 71 MMHG | HEIGHT: 69 IN

## 2019-02-04 DIAGNOSIS — Z12.11 SCREENING FOR MALIGNANT NEOPLASM OF COLON: ICD-10-CM

## 2019-02-04 LAB — GLUCOSE BLDC GLUCOMTR-MCNC: 94 MG/DL (ref 70–130)

## 2019-02-04 PROCEDURE — 82962 GLUCOSE BLOOD TEST: CPT

## 2019-02-04 PROCEDURE — 25010000002 PROPOFOL 10 MG/ML EMULSION: Performed by: ANESTHESIOLOGY

## 2019-02-04 PROCEDURE — 45378 DIAGNOSTIC COLONOSCOPY: CPT | Performed by: INTERNAL MEDICINE

## 2019-02-04 RX ORDER — LIDOCAINE HYDROCHLORIDE 20 MG/ML
INJECTION, SOLUTION INFILTRATION; PERINEURAL AS NEEDED
Status: DISCONTINUED | OUTPATIENT
Start: 2019-02-04 | End: 2019-02-04 | Stop reason: SURG

## 2019-02-04 RX ORDER — PROPOFOL 10 MG/ML
VIAL (ML) INTRAVENOUS CONTINUOUS PRN
Status: DISCONTINUED | OUTPATIENT
Start: 2019-02-04 | End: 2019-02-04 | Stop reason: SURG

## 2019-02-04 RX ORDER — SODIUM CHLORIDE, SODIUM LACTATE, POTASSIUM CHLORIDE, CALCIUM CHLORIDE 600; 310; 30; 20 MG/100ML; MG/100ML; MG/100ML; MG/100ML
30 INJECTION, SOLUTION INTRAVENOUS CONTINUOUS
Status: DISCONTINUED | OUTPATIENT
Start: 2019-02-04 | End: 2019-02-04 | Stop reason: HOSPADM

## 2019-02-04 RX ORDER — PROPOFOL 10 MG/ML
VIAL (ML) INTRAVENOUS AS NEEDED
Status: DISCONTINUED | OUTPATIENT
Start: 2019-02-04 | End: 2019-02-04 | Stop reason: SURG

## 2019-02-04 RX ADMIN — PROPOFOL 100 MCG/KG/MIN: 10 INJECTION, EMULSION INTRAVENOUS at 09:55

## 2019-02-04 RX ADMIN — SODIUM CHLORIDE, POTASSIUM CHLORIDE, SODIUM LACTATE AND CALCIUM CHLORIDE: 600; 310; 30; 20 INJECTION, SOLUTION INTRAVENOUS at 09:55

## 2019-02-04 RX ADMIN — LIDOCAINE HYDROCHLORIDE 30 MG: 20 INJECTION, SOLUTION INFILTRATION; PERINEURAL at 09:55

## 2019-02-04 RX ADMIN — SODIUM CHLORIDE, POTASSIUM CHLORIDE, SODIUM LACTATE AND CALCIUM CHLORIDE 30 ML/HR: 600; 310; 30; 20 INJECTION, SOLUTION INTRAVENOUS at 09:34

## 2019-02-04 RX ADMIN — PROPOFOL 150 MG: 10 INJECTION, EMULSION INTRAVENOUS at 09:55

## 2019-02-04 NOTE — ANESTHESIA PREPROCEDURE EVALUATION
Anesthesia Evaluation     no history of anesthetic complications:               Airway   Mallampati: II  TM distance: >3 FB  Neck ROM: full  Dental - normal exam     Comment: One loose lower front tooth    Pulmonary    (+) a smoker Former,   (-) recent URI, sleep apnea  Cardiovascular     (+) hypertension, hyperlipidemia,   (-) dysrhythmias, angina      Neuro/Psych  GI/Hepatic/Renal/Endo    (+)   diabetes mellitus, hypothyroidism,   (-) hepatitis, liver disease, no renal disease    Musculoskeletal     Abdominal    Substance History      OB/GYN          Other                      Anesthesia Plan    ASA 2     MAC     intravenous induction   Anesthetic plan, all risks, benefits, and alternatives have been provided, discussed and informed consent has been obtained with: patient.

## 2019-02-04 NOTE — H&P
[facility], [place], operated by [facility]  Pre Procedure History & Physical    Chief Complaint:   Time for my colonoscopy    Subjective     HPI:   53 y.o. male presenting for average risk screening.  No prior cscope.  No current GI issues.  No family hx of colon cancer or polyps.        Past Medical History:   Past Medical History:   Diagnosis Date   • Diabetes mellitus (CMS/HCC)    • Disease of thyroid gland    • Hyperlipidemia    • Hypertension        Family History:  Family History   Problem Relation Age of Onset   • Diabetes Mother    • Cancer Father    • Cancer Brother        Social History:   reports that he quit smoking about 22 years ago. His smoking use included cigarettes. He has a 10.00 pack-year smoking history. he has never used smokeless tobacco. He reports that he drinks alcohol. He reports that he does not use drugs.    Medications:   Medications Prior to Admission   Medication Sig Dispense Refill Last Dose   • amLODIPine (NORVASC) 10 MG tablet Take 1 tablet by mouth Daily for 180 days. For BP 90 tablet 1 2/3/2019 at Unknown time   • atorvastatin (LIPITOR) 40 MG tablet Take 1 tablet by mouth Daily for 180 days. For BP 90 tablet 1 2/3/2019 at Unknown time   • irbesartan (AVAPRO) 300 MG tablet Take 1 tablet by mouth Daily for 180 days. 90 tablet 1 2/3/2019 at Unknown time   • levothyroxine (SYNTHROID) 150 MCG tablet Take 1 tablet by mouth Daily. For thyroid; new dose 90 tablet 3 2/3/2019 at Unknown time   • meloxicam (MOBIC) 15 MG tablet Take 1 tablet by mouth Daily. For pain with food (stop if GI upset) 30 tablet 2 2/3/2019 at Unknown time   • metFORMIN ER (GLUCOPHAGE-XR) 500 MG 24 hr tablet Take 4 tablets by mouth Daily for 180 days. With food for DMII 360 tablet 1 2/3/2019 at Unknown time   • pioglitazone (ACTOS) 15 MG tablet Take 1 tablet by mouth Daily. For DMII (new dose) 90 tablet 3 2/3/2019 at Unknown time   • aspirin 81 MG tablet Take 81 mg by mouth daily.   Taking       Allergies:  Patient has no  "known allergies.    ROS:    Pertinent items are noted in HPI     Objective     Blood pressure 131/77, pulse 77, temperature 98.2 °F (36.8 °C), temperature source Oral, resp. rate 16, height 175.3 cm (69\"), weight 112 kg (246 lb 6 oz), SpO2 93 %.    Physical Exam   Constitutional: Pt is oriented to person, place, and time and well-developed, well-nourished, and in no distress.   HENT:   Mouth/Throat: Oropharynx is clear and moist.   Neck: Normal range of motion. Neck supple.   Cardiovascular: Normal rate, regular rhythm and normal heart sounds.    Pulmonary/Chest: Effort normal and breath sounds normal. No respiratory distress. No  wheezes.   Abdominal: Soft. Bowel sounds are normal.   Skin: Skin is warm and dry.   Psychiatric: Mood, memory, affect and judgment normal.     Assessment/Plan     Diagnosis:  Encounter for screening for colon cancer    Anticipated Surgical Procedure:  Colonoscopy    The risks, benefits, and alternatives of this procedure have been discussed with the patient or the responsible party- the patient understands and agrees to proceed.                                                                "

## 2019-02-04 NOTE — ANESTHESIA POSTPROCEDURE EVALUATION
Patient: Hal Alexandre    Procedure Summary     Date:  02/04/19 Room / Location:   [facility] 10 /  [facility]    Anesthesia Start:  0959 Anesthesia Stop:  1025    Procedure:  COLONOSCOPY INTO CECUM AND TI (N/A ) Diagnosis:       Screening for malignant neoplasm of colon      (Screening for malignant neoplasm of colon [Z12.11])    Surgeon:  Hong Collazo MD Provider:  Anisha Asif MD    Anesthesia Type:  MAC ASA Status:  2          Anesthesia Type: MAC  Last vitals  BP   108/72 (02/04/19 1019)   Temp   36.4 °C (97.6 °F) (02/04/19 1019)   Pulse   77 (02/04/19 1019)   Resp   20 (02/04/19 1019)     SpO2   96 % (02/04/19 1019)     Post Anesthesia Care and Evaluation    Patient location during evaluation: PHASE II  Level of consciousness: awake and alert  Anesthetic complications: No anesthetic complications

## 2019-02-15 DIAGNOSIS — E78.49 OTHER HYPERLIPIDEMIA: ICD-10-CM

## 2019-02-15 DIAGNOSIS — E11.9 CONTROLLED TYPE 2 DIABETES MELLITUS WITHOUT COMPLICATION, WITHOUT LONG-TERM CURRENT USE OF INSULIN (HCC): ICD-10-CM

## 2019-02-15 DIAGNOSIS — I10 ESSENTIAL HYPERTENSION: ICD-10-CM

## 2019-02-18 RX ORDER — IRBESARTAN 300 MG/1
TABLET ORAL
Qty: 90 TABLET | Refills: 1 | Status: SHIPPED | OUTPATIENT
Start: 2019-02-18 | End: 2019-05-02 | Stop reason: SDUPTHER

## 2019-05-02 ENCOUNTER — OFFICE VISIT (OUTPATIENT)
Dept: FAMILY MEDICINE CLINIC | Facility: CLINIC | Age: 54
End: 2019-05-02

## 2019-05-02 VITALS
BODY MASS INDEX: 36.43 KG/M2 | SYSTOLIC BLOOD PRESSURE: 130 MMHG | HEART RATE: 81 BPM | HEIGHT: 69 IN | RESPIRATION RATE: 16 BRPM | DIASTOLIC BLOOD PRESSURE: 74 MMHG | TEMPERATURE: 98.6 F | OXYGEN SATURATION: 98 % | WEIGHT: 246 LBS

## 2019-05-02 DIAGNOSIS — Z79.4 CONTROLLED TYPE 2 DIABETES MELLITUS WITHOUT COMPLICATION, WITH LONG-TERM CURRENT USE OF INSULIN (HCC): Primary | ICD-10-CM

## 2019-05-02 DIAGNOSIS — I10 ESSENTIAL HYPERTENSION: ICD-10-CM

## 2019-05-02 DIAGNOSIS — E11.9 CONTROLLED TYPE 2 DIABETES MELLITUS WITHOUT COMPLICATION, WITHOUT LONG-TERM CURRENT USE OF INSULIN (HCC): ICD-10-CM

## 2019-05-02 DIAGNOSIS — E78.49 OTHER HYPERLIPIDEMIA: ICD-10-CM

## 2019-05-02 DIAGNOSIS — E11.9 CONTROLLED TYPE 2 DIABETES MELLITUS WITHOUT COMPLICATION, WITH LONG-TERM CURRENT USE OF INSULIN (HCC): Primary | ICD-10-CM

## 2019-05-02 DIAGNOSIS — M25.542 ARTHRALGIA OF BOTH HANDS: ICD-10-CM

## 2019-05-02 DIAGNOSIS — M25.541 ARTHRALGIA OF BOTH HANDS: ICD-10-CM

## 2019-05-02 DIAGNOSIS — E03.9 ACQUIRED HYPOTHYROIDISM: ICD-10-CM

## 2019-05-02 PROCEDURE — 96372 THER/PROPH/DIAG INJ SC/IM: CPT | Performed by: NURSE PRACTITIONER

## 2019-05-02 PROCEDURE — 99214 OFFICE O/P EST MOD 30 MIN: CPT | Performed by: NURSE PRACTITIONER

## 2019-05-02 RX ORDER — KETOROLAC TROMETHAMINE 30 MG/ML
30 INJECTION, SOLUTION INTRAMUSCULAR; INTRAVENOUS ONCE
Status: COMPLETED | OUTPATIENT
Start: 2019-05-02 | End: 2019-05-02

## 2019-05-02 RX ORDER — AMLODIPINE BESYLATE 10 MG/1
10 TABLET ORAL DAILY
Qty: 90 TABLET | Refills: 1 | Status: SHIPPED | OUTPATIENT
Start: 2019-05-02 | End: 2019-11-19 | Stop reason: SDUPTHER

## 2019-05-02 RX ORDER — ATORVASTATIN CALCIUM 40 MG/1
40 TABLET, FILM COATED ORAL DAILY
Qty: 90 TABLET | Refills: 1 | Status: SHIPPED | OUTPATIENT
Start: 2019-05-02 | End: 2019-11-20 | Stop reason: SDUPTHER

## 2019-05-02 RX ORDER — METHYLPREDNISOLONE 4 MG/1
TABLET ORAL
Qty: 21 TABLET | Refills: 0 | Status: SHIPPED | OUTPATIENT
Start: 2019-05-02 | End: 2019-07-10

## 2019-05-02 RX ORDER — IRBESARTAN 300 MG/1
300 TABLET ORAL DAILY
Qty: 90 TABLET | Refills: 1 | Status: SHIPPED | OUTPATIENT
Start: 2019-05-02 | End: 2019-12-30 | Stop reason: SDUPTHER

## 2019-05-02 RX ORDER — METFORMIN HYDROCHLORIDE 500 MG/1
2000 TABLET, EXTENDED RELEASE ORAL DAILY
Qty: 360 TABLET | Refills: 1 | Status: SHIPPED | OUTPATIENT
Start: 2019-05-02 | End: 2019-11-19 | Stop reason: SDUPTHER

## 2019-05-02 RX ADMIN — KETOROLAC TROMETHAMINE 30 MG: 30 INJECTION, SOLUTION INTRAMUSCULAR; INTRAVENOUS at 16:28

## 2019-05-02 NOTE — PROGRESS NOTES
Subjective   Hal Alexandre is a 53 y.o. male.     History of Present Illness   Hal Alexandre 53 y.o. male who presents today for routine follow up check and medication refills.  he has a history of   Patient Active Problem List   Diagnosis   • Essential hypertension   • Hyperlipidemia   • Acquired hypothyroidism   • Controlled type 2 diabetes mellitus without complication, with long-term current use of insulin (CMS/[facility])   • Chronic pain of right knee   • Tear of medial meniscus of right knee, current   • Screening for malignant neoplasm of colon   .  Since the last visit, he has overall felt well.  He has Hypertenision and is well controlled on medication, DMII and is well controlled on medication, Hyperlipidemia and is well controlled on medication and Hypothyroidism and is well controlled on Rx.  Labs are in desired treatment range.  he has been compliant with current medications have reviewed them.  The patient denies medication side effects.    Results for orders placed or performed during the hospital encounter of 02/04/19   POC Glucose Once   Result Value Ref Range    Glucose 94 70 - 130 mg/dL     Patient reports pain in bilateral hands for weeks but that his pain is primarily in his left 3rd finger.  Reports decreased flexion and some swelling.  Denies redness or warmth. Denies hx of gout.   The following portions of the patient's history were reviewed and updated as appropriate: allergies, current medications, past family history, past medical history, past social history, past surgical history and problem list.    Review of Systems   Constitutional: Negative for fatigue.   Respiratory: Negative for cough and shortness of breath.    Cardiovascular: Negative for chest pain and palpitations.   Endocrine: Negative for cold intolerance, heat intolerance, polydipsia, polyphagia and polyuria.   Musculoskeletal: Positive for arthralgias and joint swelling.   Skin: Negative for rash.   Psychiatric/Behavioral:  Negative for dysphoric mood and sleep disturbance. The patient is not nervous/anxious.        Objective   Physical Exam   Constitutional: He is oriented to person, place, and time. He appears well-developed and well-nourished.   Neck: Carotid bruit is not present.   Cardiovascular: Normal rate and regular rhythm.   Pulmonary/Chest: Effort normal and breath sounds normal.   Musculoskeletal:        Left hand: He exhibits decreased range of motion, tenderness and swelling. He exhibits no bony tenderness. Normal sensation noted.        Hands:  Neurological: He is oriented to person, place, and time.   Skin: Skin is warm and dry.   Psychiatric: He has a normal mood and affect. His behavior is normal. Judgment and thought content normal.   Nursing note and vitals reviewed.      Assessment/Plan   Hal was seen today for wants labs for gout and hand pain.    Diagnoses and all orders for this visit:    Controlled type 2 diabetes mellitus without complication, with long-term current use of insulin (CMS/[facility])  -     Comprehensive metabolic panel  -     Lipid panel  -     CBC and Differential  -     TSH  -     PSA Screen  -     T4, free  -     Hemoglobin A1c    Acquired hypothyroidism  -     Comprehensive metabolic panel  -     Lipid panel  -     CBC and Differential  -     TSH  -     PSA Screen  -     T4, free  -     Hemoglobin A1c  -     Uric acid  -     RPR  -     Rheumatoid Factor    Arthralgia of both hands  -     Uric acid  -     RPR  -     Rheumatoid Factor  -     ketorolac (TORADOL) injection 30 mg  -     methylPREDNISolone (MEDROL, RUSS,) 4 MG tablet; follow package directions    Controlled type 2 diabetes mellitus without complication, without long-term current use of insulin (CMS/[facility])  -     metFORMIN ER (GLUCOPHAGE-XR) 500 MG 24 hr tablet; Take 4 tablets by mouth Daily for 180 days. With food for DMII  -     irbesartan (AVAPRO) 300 MG tablet; Take 1 tablet by mouth Daily.  -     atorvastatin (LIPITOR) 40 MG tablet;  Take 1 tablet by mouth Daily for 180 days. For BP  -     amLODIPine (NORVASC) 10 MG tablet; Take 1 tablet by mouth Daily for 180 days. For BP    Essential hypertension  -     metFORMIN ER (GLUCOPHAGE-XR) 500 MG 24 hr tablet; Take 4 tablets by mouth Daily for 180 days. With food for DMII  -     irbesartan (AVAPRO) 300 MG tablet; Take 1 tablet by mouth Daily.  -     atorvastatin (LIPITOR) 40 MG tablet; Take 1 tablet by mouth Daily for 180 days. For BP  -     amLODIPine (NORVASC) 10 MG tablet; Take 1 tablet by mouth Daily for 180 days. For BP    Other hyperlipidemia  -     metFORMIN ER (GLUCOPHAGE-XR) 500 MG 24 hr tablet; Take 4 tablets by mouth Daily for 180 days. With food for DMII  -     irbesartan (AVAPRO) 300 MG tablet; Take 1 tablet by mouth Daily.  -     atorvastatin (LIPITOR) 40 MG tablet; Take 1 tablet by mouth Daily for 180 days. For BP  -     amLODIPine (NORVASC) 10 MG tablet; Take 1 tablet by mouth Daily for 180 days. For BP      Requests Toradol injection today.

## 2019-05-04 LAB
ALBUMIN SERPL-MCNC: 4.6 G/DL (ref 3.5–5.2)
ALBUMIN/GLOB SERPL: 1.4 G/DL
ALP SERPL-CCNC: 129 U/L (ref 39–117)
ALT SERPL-CCNC: 31 U/L (ref 1–41)
AST SERPL-CCNC: 14 U/L (ref 1–40)
BASOPHILS # BLD AUTO: 0.1 10*3/MM3 (ref 0–0.2)
BASOPHILS NFR BLD AUTO: 1.2 % (ref 0–1.5)
BILIRUB SERPL-MCNC: 0.4 MG/DL (ref 0.2–1.2)
BUN SERPL-MCNC: 15 MG/DL (ref 6–20)
BUN/CREAT SERPL: 16 (ref 7–25)
CALCIUM SERPL-MCNC: 10.4 MG/DL (ref 8.6–10.5)
CHLORIDE SERPL-SCNC: 100 MMOL/L (ref 98–107)
CHOLEST SERPL-MCNC: 195 MG/DL (ref 0–200)
CO2 SERPL-SCNC: 23.7 MMOL/L (ref 22–29)
CREAT SERPL-MCNC: 0.94 MG/DL (ref 0.76–1.27)
EOSINOPHIL # BLD AUTO: 0.58 10*3/MM3 (ref 0–0.4)
EOSINOPHIL NFR BLD AUTO: 7.1 % (ref 0.3–6.2)
ERYTHROCYTE [DISTWIDTH] IN BLOOD BY AUTOMATED COUNT: 13.7 % (ref 12.3–15.4)
GLOBULIN SER CALC-MCNC: 3.4 GM/DL
GLUCOSE SERPL-MCNC: 136 MG/DL (ref 65–99)
HBA1C MFR BLD: 6.4 % (ref 4.8–5.6)
HCT VFR BLD AUTO: 48.4 % (ref 37.5–51)
HDLC SERPL-MCNC: 53 MG/DL (ref 40–60)
HGB BLD-MCNC: 15.1 G/DL (ref 13–17.7)
IMM GRANULOCYTES # BLD AUTO: 0.12 10*3/MM3 (ref 0–0.05)
IMM GRANULOCYTES NFR BLD AUTO: 1.5 % (ref 0–0.5)
LDLC SERPL CALC-MCNC: 119 MG/DL (ref 0–100)
LYMPHOCYTES # BLD AUTO: 2.26 10*3/MM3 (ref 0.7–3.1)
LYMPHOCYTES NFR BLD AUTO: 27.7 % (ref 19.6–45.3)
MCH RBC QN AUTO: 27.8 PG (ref 26.6–33)
MCHC RBC AUTO-ENTMCNC: 31.2 G/DL (ref 31.5–35.7)
MCV RBC AUTO: 89.1 FL (ref 79–97)
MONOCYTES # BLD AUTO: 0.96 10*3/MM3 (ref 0.1–0.9)
MONOCYTES NFR BLD AUTO: 11.8 % (ref 5–12)
NEUTROPHILS # BLD AUTO: 4.13 10*3/MM3 (ref 1.7–7)
NEUTROPHILS NFR BLD AUTO: 50.7 % (ref 42.7–76)
NRBC BLD AUTO-RTO: 0 /100 WBC (ref 0–0.2)
PLATELET # BLD AUTO: 382 10*3/MM3 (ref 140–450)
POTASSIUM SERPL-SCNC: 5.1 MMOL/L (ref 3.5–5.2)
PROT SERPL-MCNC: 8 G/DL (ref 6–8.5)
PSA SERPL-MCNC: 1.19 NG/ML (ref 0–4)
RBC # BLD AUTO: 5.43 10*6/MM3 (ref 4.14–5.8)
RHEUMATOID FACT SERPL-ACNC: <10 IU/ML (ref 0–13.9)
RPR SER QL: NORMAL
SODIUM SERPL-SCNC: 138 MMOL/L (ref 136–145)
T4 FREE SERPL-MCNC: 1.53 NG/DL (ref 0.93–1.7)
TRIGL SERPL-MCNC: 115 MG/DL (ref 0–150)
TSH SERPL DL<=0.005 MIU/L-ACNC: 4.86 MIU/ML (ref 0.27–4.2)
URATE SERPL-MCNC: 6.8 MG/DL (ref 3.4–7)
VLDLC SERPL CALC-MCNC: 23 MG/DL
WBC # BLD AUTO: 8.15 10*3/MM3 (ref 3.4–10.8)

## 2019-05-17 LAB
ANA SER QL: NEGATIVE
WRITTEN AUTHORIZATION: NORMAL

## 2019-05-31 ENCOUNTER — TELEPHONE (OUTPATIENT)
Dept: FAMILY MEDICINE CLINIC | Facility: CLINIC | Age: 54
End: 2019-05-31

## 2019-06-04 ENCOUNTER — TELEPHONE (OUTPATIENT)
Dept: FAMILY MEDICINE CLINIC | Facility: CLINIC | Age: 54
End: 2019-06-04

## 2019-06-04 DIAGNOSIS — M79.642 PAIN IN LEFT HAND: Primary | ICD-10-CM

## 2019-07-10 ENCOUNTER — HOSPITAL ENCOUNTER (OUTPATIENT)
Dept: CARDIOLOGY | Facility: HOSPITAL | Age: 54
Discharge: HOME | End: 2019-07-10
Admitting: INTERNAL MEDICINE

## 2019-07-10 ENCOUNTER — HOSPITAL ENCOUNTER (OUTPATIENT)
Dept: CARDIOLOGY | Facility: HOSPITAL | Age: 54
Discharge: HOME | End: 2019-07-10

## 2019-07-10 ENCOUNTER — OFFICE VISIT (OUTPATIENT)
Dept: FAMILY MEDICINE CLINIC | Facility: CLINIC | Age: 54
End: 2019-07-10

## 2019-07-10 VITALS — DIASTOLIC BLOOD PRESSURE: 86 MMHG | HEART RATE: 88 BPM | SYSTOLIC BLOOD PRESSURE: 148 MMHG

## 2019-07-10 VITALS
WEIGHT: 246 LBS | BODY MASS INDEX: 36.43 KG/M2 | TEMPERATURE: 97.7 F | HEIGHT: 69 IN | SYSTOLIC BLOOD PRESSURE: 162 MMHG | DIASTOLIC BLOOD PRESSURE: 98 MMHG | HEART RATE: 79 BPM | OXYGEN SATURATION: 97 % | RESPIRATION RATE: 16 BRPM

## 2019-07-10 VITALS
SYSTOLIC BLOOD PRESSURE: 136 MMHG | OXYGEN SATURATION: 93 % | RESPIRATION RATE: 16 BRPM | BODY MASS INDEX: 37.03 KG/M2 | HEART RATE: 83 BPM | WEIGHT: 250 LBS | DIASTOLIC BLOOD PRESSURE: 90 MMHG | HEIGHT: 69 IN

## 2019-07-10 DIAGNOSIS — E11.9 TYPE 2 DIABETES MELLITUS WITHOUT COMPLICATION, WITHOUT LONG-TERM CURRENT USE OF INSULIN (HCC): ICD-10-CM

## 2019-07-10 DIAGNOSIS — R07.9 CHEST PAIN, UNSPECIFIED TYPE: Primary | ICD-10-CM

## 2019-07-10 DIAGNOSIS — R06.02 SHORTNESS OF BREATH: ICD-10-CM

## 2019-07-10 DIAGNOSIS — E78.2 MIXED HYPERLIPIDEMIA: ICD-10-CM

## 2019-07-10 DIAGNOSIS — E66.01 CLASS 2 SEVERE OBESITY DUE TO EXCESS CALORIES WITH SERIOUS COMORBIDITY AND BODY MASS INDEX (BMI) OF 36.0 TO 36.9 IN ADULT (HCC): ICD-10-CM

## 2019-07-10 DIAGNOSIS — I10 ESSENTIAL HYPERTENSION: ICD-10-CM

## 2019-07-10 DIAGNOSIS — R07.9 CHEST PAIN, UNSPECIFIED TYPE: ICD-10-CM

## 2019-07-10 LAB
ALBUMIN SERPL-MCNC: 4.3 G/DL (ref 3.5–5.2)
ALBUMIN/GLOB SERPL: 1.5 G/DL
ALP SERPL-CCNC: 108 U/L (ref 39–117)
ALT SERPL W P-5'-P-CCNC: 32 U/L (ref 1–41)
ANION GAP SERPL CALCULATED.3IONS-SCNC: 12.5 MMOL/L (ref 5–15)
AORTIC ROOT ANNULUS: 2.8 CM
ASCENDING AORTA: 3.6 CM
AST SERPL-CCNC: 19 U/L (ref 1–40)
BASOPHILS # BLD AUTO: 0.06 10*3/MM3 (ref 0–0.2)
BASOPHILS NFR BLD AUTO: 1 % (ref 0–1.5)
BH CV ECHO MEAS - ACS: 1.8 CM
BH CV ECHO MEAS - AO MAX PG: 6 MMHG
BH CV ECHO MEAS - AO V2 MAX: 122.2 CM/SEC
BH CV ECHO MEAS - BSA(HAYCOCK): 2.4 M^2
BH CV ECHO MEAS - BSA: 2.3 M^2
BH CV ECHO MEAS - BZI_BMI: 36.9 KILOGRAMS/M^2
BH CV ECHO MEAS - BZI_METRIC_HEIGHT: 175.3 CM
BH CV ECHO MEAS - BZI_METRIC_WEIGHT: 113.4 KG
BH CV ECHO MEAS - EDV(MOD-SP4): 101 ML
BH CV ECHO MEAS - EDV(TEICH): 102.4 ML
BH CV ECHO MEAS - EF(CUBED): 74.1 %
BH CV ECHO MEAS - EF(MOD-BP): 64 %
BH CV ECHO MEAS - EF(MOD-SP4): 63.4 %
BH CV ECHO MEAS - EF(TEICH): 65.9 %
BH CV ECHO MEAS - ESV(MOD-SP4): 37 ML
BH CV ECHO MEAS - ESV(TEICH): 34.9 ML
BH CV ECHO MEAS - FS: 36.3 %
BH CV ECHO MEAS - IVS/LVPW: 1
BH CV ECHO MEAS - IVSD: 1.3 CM
BH CV ECHO MEAS - LAT PEAK E' VEL: 9 CM/SEC
BH CV ECHO MEAS - LV DIASTOLIC VOL/BSA (35-75): 44.5 ML/M^2
BH CV ECHO MEAS - LV MASS(C)D: 229.4 GRAMS
BH CV ECHO MEAS - LV MASS(C)DI: 101 GRAMS/M^2
BH CV ECHO MEAS - LV SYSTOLIC VOL/BSA (12-30): 16.3 ML/M^2
BH CV ECHO MEAS - LVIDD: 4.7 CM
BH CV ECHO MEAS - LVIDS: 3 CM
BH CV ECHO MEAS - LVLD AP4: 8.4 CM
BH CV ECHO MEAS - LVLS AP4: 7.8 CM
BH CV ECHO MEAS - LVPWD: 1.2 CM
BH CV ECHO MEAS - MED PEAK E' VEL: 7 CM/SEC
BH CV ECHO MEAS - MV A DUR: 0.13 SEC
BH CV ECHO MEAS - MV A MAX VEL: 70.8 CM/SEC
BH CV ECHO MEAS - MV DEC SLOPE: 234.9 CM/SEC^2
BH CV ECHO MEAS - MV DEC TIME: 0.21 SEC
BH CV ECHO MEAS - MV E MAX VEL: 47.1 CM/SEC
BH CV ECHO MEAS - MV E/A: 0.67
BH CV ECHO MEAS - MV P1/2T MAX VEL: 50 CM/SEC
BH CV ECHO MEAS - MV P1/2T: 62.3 MSEC
BH CV ECHO MEAS - MVA P1/2T LCG: 4.4 CM^2
BH CV ECHO MEAS - MVA(P1/2T): 3.5 CM^2
BH CV ECHO MEAS - PULM A REVS DUR: 0.11 SEC
BH CV ECHO MEAS - PULM A REVS VEL: 24 CM/SEC
BH CV ECHO MEAS - PULM DIAS VEL: 39.7 CM/SEC
BH CV ECHO MEAS - PULM S/D: 0.8
BH CV ECHO MEAS - PULM SYS VEL: 31.8 CM/SEC
BH CV ECHO MEAS - SI(CUBED): 33.9 ML/M^2
BH CV ECHO MEAS - SI(MOD-SP4): 28.2 ML/M^2
BH CV ECHO MEAS - SI(TEICH): 29.7 ML/M^2
BH CV ECHO MEAS - SV(CUBED): 77 ML
BH CV ECHO MEAS - SV(MOD-SP4): 64 ML
BH CV ECHO MEAS - SV(TEICH): 67.5 ML
BH CV ECHO MEAS - TAPSE (>1.6): 2 CM2
BH CV ECHO MEASUREMENTS AVERAGE E/E' RATIO: 5.89
BH CV STRESS BP STAGE 1: NORMAL
BH CV STRESS BP STAGE 2: NORMAL
BH CV STRESS DURATION MIN STAGE 1: 3
BH CV STRESS DURATION MIN STAGE 2: 3
BH CV STRESS DURATION MIN STAGE 3: 1
BH CV STRESS DURATION SEC STAGE 1: 0
BH CV STRESS DURATION SEC STAGE 2: 0
BH CV STRESS DURATION SEC STAGE 3: 30
BH CV STRESS ECHO POST STRESS EJECTION FRACTION EF: 80 %
BH CV STRESS GRADE STAGE 1: 10
BH CV STRESS GRADE STAGE 2: 12
BH CV STRESS GRADE STAGE 3: 14
BH CV STRESS HR STAGE 1: 119
BH CV STRESS HR STAGE 2: 137
BH CV STRESS HR STAGE 3: 150
BH CV STRESS METS STAGE 1: 5
BH CV STRESS METS STAGE 2: 7.5
BH CV STRESS METS STAGE 3: 10
BH CV STRESS O2 STAGE 1: 82
BH CV STRESS PROTOCOL 1: NORMAL
BH CV STRESS SPEED STAGE 1: 1.7
BH CV STRESS SPEED STAGE 2: 2.5
BH CV STRESS SPEED STAGE 3: 3.4
BH CV STRESS STAGE 1: 1
BH CV STRESS STAGE 2: 2
BH CV STRESS STAGE 3: 3
BH CV XLRA - RV BASE: 2.9 CM
BH CV XLRA - TDI S': 15 CM/SEC
BILIRUB SERPL-MCNC: 0.3 MG/DL (ref 0.2–1.2)
BUN BLD-MCNC: 14 MG/DL (ref 6–20)
BUN/CREAT SERPL: 14.6 (ref 7–25)
CALCIUM SPEC-SCNC: 9.3 MG/DL (ref 8.6–10.5)
CHLORIDE SERPL-SCNC: 104 MMOL/L (ref 98–107)
CO2 SERPL-SCNC: 23.5 MMOL/L (ref 22–29)
CREAT BLD-MCNC: 0.96 MG/DL (ref 0.76–1.27)
D DIMER PPP FEU-MCNC: <0.27 MCGFEU/ML (ref 0–0.49)
DEPRECATED RDW RBC AUTO: 41.2 FL (ref 37–54)
EOSINOPHIL # BLD AUTO: 0.27 10*3/MM3 (ref 0–0.4)
EOSINOPHIL NFR BLD AUTO: 4.5 % (ref 0.3–6.2)
ERYTHROCYTE [DISTWIDTH] IN BLOOD BY AUTOMATED COUNT: 13.3 % (ref 12.3–15.4)
GFR SERPL CREATININE-BSD FRML MDRD: 82 ML/MIN/1.73
GLOBULIN UR ELPH-MCNC: 2.9 GM/DL
GLUCOSE BLD-MCNC: 175 MG/DL (ref 65–99)
HCT VFR BLD AUTO: 44.7 % (ref 37.5–51)
HGB BLD-MCNC: 14.6 G/DL (ref 13–17.7)
IMM GRANULOCYTES # BLD AUTO: 0.05 10*3/MM3 (ref 0–0.05)
IMM GRANULOCYTES NFR BLD AUTO: 0.8 % (ref 0–0.5)
LEFT ATRIUM VOLUME INDEX: 14 ML/M2
LV EF 2D ECHO EST: 64 %
LYMPHOCYTES # BLD AUTO: 2.08 10*3/MM3 (ref 0.7–3.1)
LYMPHOCYTES NFR BLD AUTO: 34.7 % (ref 19.6–45.3)
MAXIMAL PREDICTED HEART RATE: 168 BPM
MCH RBC QN AUTO: 27.9 PG (ref 26.6–33)
MCHC RBC AUTO-ENTMCNC: 32.7 G/DL (ref 31.5–35.7)
MCV RBC AUTO: 85.5 FL (ref 79–97)
MONOCYTES # BLD AUTO: 0.67 10*3/MM3 (ref 0.1–0.9)
MONOCYTES NFR BLD AUTO: 11.2 % (ref 5–12)
NEUTROPHILS # BLD AUTO: 2.87 10*3/MM3 (ref 1.7–7)
NEUTROPHILS NFR BLD AUTO: 47.8 % (ref 42.7–76)
NRBC BLD AUTO-RTO: 0 /100 WBC (ref 0–0.2)
NT-PROBNP SERPL-MCNC: 26.4 PG/ML (ref 5–900)
PERCENT MAX PREDICTED HR: 89.29 %
PLATELET # BLD AUTO: 346 10*3/MM3 (ref 140–450)
PMV BLD AUTO: 10 FL (ref 6–12)
POTASSIUM BLD-SCNC: 4 MMOL/L (ref 3.5–5.2)
PROT SERPL-MCNC: 7.2 G/DL (ref 6–8.5)
RBC # BLD AUTO: 5.23 10*6/MM3 (ref 4.14–5.8)
SINUS: 3.5 CM
SODIUM BLD-SCNC: 140 MMOL/L (ref 136–145)
STJ: 3.5 CM
STRESS BASELINE BP: NORMAL MMHG
STRESS BASELINE HR: 88 BPM
STRESS O2 SAT REST: 93 %
STRESS PERCENT HR: 105 %
STRESS POST ESTIMATED WORKLOAD: 8 METS
STRESS POST EXERCISE DUR MIN: 7 MIN
STRESS POST EXERCISE DUR SEC: 30 SEC
STRESS POST PEAK BP: NORMAL MMHG
STRESS POST PEAK HR: 150 BPM
STRESS TARGET HR: 143 BPM
TROPONIN T SERPL-MCNC: <0.01 NG/ML (ref 0–0.03)
WBC NRBC COR # BLD: 6 10*3/MM3 (ref 3.4–10.8)

## 2019-07-10 PROCEDURE — 93016 CV STRESS TEST SUPVJ ONLY: CPT | Performed by: INTERNAL MEDICINE

## 2019-07-10 PROCEDURE — 93018 CV STRESS TEST I&R ONLY: CPT | Performed by: INTERNAL MEDICINE

## 2019-07-10 PROCEDURE — 93325 DOPPLER ECHO COLOR FLOW MAPG: CPT | Performed by: INTERNAL MEDICINE

## 2019-07-10 PROCEDURE — 93350 STRESS TTE ONLY: CPT

## 2019-07-10 PROCEDURE — 94760 N-INVAS EAR/PLS OXIMETRY 1: CPT

## 2019-07-10 PROCEDURE — 93320 DOPPLER ECHO COMPLETE: CPT

## 2019-07-10 PROCEDURE — 93017 CV STRESS TEST TRACING ONLY: CPT

## 2019-07-10 PROCEDURE — 36415 COLL VENOUS BLD VENIPUNCTURE: CPT

## 2019-07-10 PROCEDURE — 71046 X-RAY EXAM CHEST 2 VIEWS: CPT | Performed by: FAMILY MEDICINE

## 2019-07-10 PROCEDURE — 85025 COMPLETE CBC W/AUTO DIFF WBC: CPT | Performed by: INTERNAL MEDICINE

## 2019-07-10 PROCEDURE — 93350 STRESS TTE ONLY: CPT | Performed by: INTERNAL MEDICINE

## 2019-07-10 PROCEDURE — 83880 ASSAY OF NATRIURETIC PEPTIDE: CPT | Performed by: INTERNAL MEDICINE

## 2019-07-10 PROCEDURE — 93320 DOPPLER ECHO COMPLETE: CPT | Performed by: INTERNAL MEDICINE

## 2019-07-10 PROCEDURE — 99204 OFFICE O/P NEW MOD 45 MIN: CPT | Performed by: INTERNAL MEDICINE

## 2019-07-10 PROCEDURE — 93325 DOPPLER ECHO COLOR FLOW MAPG: CPT

## 2019-07-10 PROCEDURE — 99214 OFFICE O/P EST MOD 30 MIN: CPT | Performed by: FAMILY MEDICINE

## 2019-07-10 PROCEDURE — 84484 ASSAY OF TROPONIN QUANT: CPT | Performed by: INTERNAL MEDICINE

## 2019-07-10 PROCEDURE — 80053 COMPREHEN METABOLIC PANEL: CPT | Performed by: INTERNAL MEDICINE

## 2019-07-10 PROCEDURE — 93352 ADMIN ECG CONTRAST AGENT: CPT | Performed by: INTERNAL MEDICINE

## 2019-07-10 PROCEDURE — 93000 ELECTROCARDIOGRAM COMPLETE: CPT | Performed by: FAMILY MEDICINE

## 2019-07-10 PROCEDURE — 85379 FIBRIN DEGRADATION QUANT: CPT | Performed by: INTERNAL MEDICINE

## 2019-07-10 PROCEDURE — 25010000002 PERFLUTREN (DEFINITY) 8.476 MG IN SODIUM CHLORIDE 0.9 % 10 ML INJECTION: Performed by: INTERNAL MEDICINE

## 2019-07-10 RX ORDER — SODIUM CHLORIDE 0.9 % (FLUSH) 0.9 %
10 SYRINGE (ML) INJECTION AS NEEDED
Status: DISCONTINUED | OUTPATIENT
Start: 2019-07-10 | End: 2020-05-08 | Stop reason: HOSPADM

## 2019-07-10 RX ORDER — NITROGLYCERIN 0.4 MG/1
0.4 TABLET SUBLINGUAL
Status: DISCONTINUED | OUTPATIENT
Start: 2019-07-10 | End: 2020-09-09

## 2019-07-10 RX ADMIN — PERFLUTREN 3 ML: 6.52 INJECTION, SUSPENSION INTRAVENOUS at 14:14

## 2019-07-10 NOTE — PATIENT INSTRUCTIONS
"Hypertension  Hypertension, commonly called high blood pressure, is when the force of blood pumping through the arteries is too strong. The arteries are the blood vessels that carry blood from the heart throughout the body. Hypertension forces the heart to work harder to pump blood and may cause arteries to become narrow or stiff. Having untreated or uncontrolled hypertension can cause heart attacks, strokes, kidney disease, and other problems.  A blood pressure reading consists of a higher number over a lower number. Ideally, your blood pressure should be below 120/80. The first (\"top\") number is called the systolic pressure. It is a measure of the pressure in your arteries as your heart beats. The second (\"bottom\") number is called the diastolic pressure. It is a measure of the pressure in your arteries as the heart relaxes.  What are the causes?  The cause of this condition is not known.  What increases the risk?  Some risk factors for high blood pressure are under your control. Others are not.  Factors you can change  · Smoking.  · Having type 2 diabetes mellitus, high cholesterol, or both.  · Not getting enough exercise or physical activity.  · Being overweight.  · Having too much fat, sugar, calories, or salt (sodium) in your diet.  · Drinking too much alcohol.  Factors that are difficult or impossible to change  · Having chronic kidney disease.  · Having a family history of high blood pressure.  · Age. Risk increases with age.  · Race. You may be at higher risk if you are -American.  · Gender. Men are at higher risk than women before age 45. After age 65, women are at higher risk than men.  · Having obstructive sleep apnea.  · Stress.  What are the signs or symptoms?  Extremely high blood pressure (hypertensive crisis) may cause:  · Headache.  · Anxiety.  · Shortness of breath.  · Nosebleed.  · Nausea and vomiting.  · Severe chest pain.  · Jerky movements you cannot control (seizures).    How is this " diagnosed?  This condition is diagnosed by measuring your blood pressure while you are seated, with your arm resting on a surface. The cuff of the blood pressure monitor will be placed directly against the skin of your upper arm at the level of your heart. It should be measured at least twice using the same arm. Certain conditions can cause a difference in blood pressure between your right and left arms.  Certain factors can cause blood pressure readings to be lower or higher than normal (elevated) for a short period of time:  · When your blood pressure is higher when you are in a health care provider's office than when you are at home, this is called white coat hypertension. Most people with this condition do not need medicines.  · When your blood pressure is higher at home than when you are in a health care provider's office, this is called masked hypertension. Most people with this condition may need medicines to control blood pressure.    If you have a high blood pressure reading during one visit or you have normal blood pressure with other risk factors:  · You may be asked to return on a different day to have your blood pressure checked again.  · You may be asked to monitor your blood pressure at home for 1 week or longer.    If you are diagnosed with hypertension, you may have other blood or imaging tests to help your health care provider understand your overall risk for other conditions.  How is this treated?  This condition is treated by making healthy lifestyle changes, such as eating healthy foods, exercising more, and reducing your alcohol intake. Your health care provider may prescribe medicine if lifestyle changes are not enough to get your blood pressure under control, and if:  · Your systolic blood pressure is above 130.  · Your diastolic blood pressure is above 80.    Your personal target blood pressure may vary depending on your medical conditions, your age, and other factors.  Follow these  instructions at home:  Eating and drinking  · Eat a diet that is high in fiber and potassium, and low in sodium, added sugar, and fat. An example eating plan is called the DASH (Dietary Approaches to Stop Hypertension) diet. To eat this way:  ? Eat plenty of fresh fruits and vegetables. Try to fill half of your plate at each meal with fruits and vegetables.  ? Eat whole grains, such as whole wheat pasta, brown rice, or whole grain bread. Fill about one quarter of your plate with whole grains.  ? Eat or drink low-fat dairy products, such as skim milk or low-fat yogurt.  ? Avoid fatty cuts of meat, processed or cured meats, and poultry with skin. Fill about one quarter of your plate with lean proteins, such as fish, chicken without skin, beans, eggs, and tofu.  ? Avoid premade and processed foods. These tend to be higher in sodium, added sugar, and fat.  · Reduce your daily sodium intake. Most people with hypertension should eat less than 1,500 mg of sodium a day.  · Limit alcohol intake to no more than 1 drink a day for nonpregnant women and 2 drinks a day for men. One drink equals 12 oz of beer, 5 oz of wine, or 1½ oz of hard liquor.  Lifestyle  · Work with your health care provider to maintain a healthy body weight or to lose weight. Ask what an ideal weight is for you.  · Get at least 30 minutes of exercise that causes your heart to beat faster (aerobic exercise) most days of the week. Activities may include walking, swimming, or biking.  · Include exercise to strengthen your muscles (resistance exercise), such as pilates or lifting weights, as part of your weekly exercise routine. Try to do these types of exercises for 30 minutes at least 3 days a week.  · Do not use any products that contain nicotine or tobacco, such as cigarettes and e-cigarettes. If you need help quitting, ask your health care provider.  · Monitor your blood pressure at home as told by your health care provider.  · Keep all follow-up visits as  told by your health care provider. This is important.  Medicines  · Take over-the-counter and prescription medicines only as told by your health care provider. Follow directions carefully. Blood pressure medicines must be taken as prescribed.  · Do not skip doses of blood pressure medicine. Doing this puts you at risk for problems and can make the medicine less effective.  · Ask your health care provider about side effects or reactions to medicines that you should watch for.  Contact a health care provider if:  · You think you are having a reaction to a medicine you are taking.  · You have headaches that keep coming back (recurring).  · You feel dizzy.  · You have swelling in your ankles.  · You have trouble with your vision.  Get help right away if:  · You develop a severe headache or confusion.  · You have unusual weakness or numbness.  · You feel faint.  · You have severe pain in your chest or abdomen.  · You vomit repeatedly.  · You have trouble breathing.  Summary  · Hypertension is when the force of blood pumping through your arteries is too strong. If this condition is not controlled, it may put you at risk for serious complications.  · Your personal target blood pressure may vary depending on your medical conditions, your age, and other factors. For most people, a normal blood pressure is less than 120/80.  · Hypertension is treated with lifestyle changes, medicines, or a combination of both. Lifestyle changes include weight loss, eating a healthy, low-sodium diet, exercising more, and limiting alcohol.  This information is not intended to replace advice given to you by your health care provider. Make sure you discuss any questions you have with your health care provider.  Document Released: 12/18/2006 Document Revised: 11/15/2017 Document Reviewed: 11/15/2017  [employer] Patient Education © 2019 [employer]    Diabetes Mellitus and Exercise  Exercising regularly is important for your overall  health, especially when you have diabetes (diabetes mellitus). Exercising is not only about losing weight. It has many other health benefits, such as increasing muscle strength and bone density and reducing body fat and stress. This leads to improved fitness, flexibility, and endurance, all of which result in better overall health.  Exercise has additional benefits for people with diabetes, including:  · Reducing appetite.  · Helping to lower and control blood glucose.  · Lowering blood pressure.  · Helping to control amounts of fatty substances (lipids) in the blood, such as cholesterol and triglycerides.  · Helping the body to respond better to insulin (improving insulin sensitivity).  · Reducing how much insulin the body needs.  · Decreasing the risk for heart disease by:  ? Lowering cholesterol and triglyceride levels.  ? Increasing the levels of good cholesterol.  ? Lowering blood glucose levels.    What is my activity plan?  Your health care provider or certified diabetes educator can help you make a plan for the type and frequency of exercise (activity plan) that works for you. Make sure that you:  · Do at least 150 minutes of moderate-intensity or vigorous-intensity exercise each week. This could be brisk walking, biking, or water aerobics.  ? Do stretching and strength exercises, such as yoga or weightlifting, at least 2 times a week.  ? Spread out your activity over at least 3 days of the week.  · Get some form of physical activity every day.  ? Do not go more than 2 days in a row without some kind of physical activity.  ? Avoid being inactive for more than 30 minutes at a time. Take frequent breaks to walk or stretch.  · Choose a type of exercise or activity that you enjoy, and set realistic goals.  · Start slowly, and gradually increase the intensity of your exercise over time.    What do I need to know about managing my diabetes?  · Check your blood glucose before and after exercising.  ? If your blood  glucose is 240 mg/dL (13.3 mmol/L) or higher before you exercise, check your urine for ketones. If you have ketones in your urine, do not exercise until your blood glucose returns to normal.  ? If your blood glucose is 100 mg/dL (5.6 mmol/L) or lower, eat a snack containing 15-20 grams of carbohydrate. Check your blood glucose 15 minutes after the snack to make sure that your level is above 100 mg/dL (5.6 mmol/L) before you start your exercise.  · Know the symptoms of low blood glucose (hypoglycemia) and how to treat it. Your risk for hypoglycemia increases during and after exercise. Common symptoms of hypoglycemia can include:  ? Hunger.  ? Anxiety.  ? Sweating and feeling clammy.  ? Confusion.  ? Dizziness or feeling light-headed.  ? Increased heart rate or palpitations.  ? Blurry vision.  ? Tingling or numbness around the mouth, lips, or tongue.  ? Tremors or shakes.  ? Irritability.  · Keep a rapid-acting carbohydrate snack available before, during, and after exercise to help prevent or treat hypoglycemia.  · Avoid injecting insulin into areas of the body that are going to be exercised. For example, avoid injecting insulin into:  ? The arms, when playing tennis.  ? The legs, when jogging.  · Keep records of your exercise habits. Doing this can help you and your health care provider adjust your diabetes management plan as needed. Write down:  ? Food that you eat before and after you exercise.  ? Blood glucose levels before and after you exercise.  ? The type and amount of exercise you have done.  ? When your insulin is expected to peak, if you use insulin. Avoid exercising at times when your insulin is peaking.  · When you start a new exercise or activity, work with your health care provider to make sure the activity is safe for you, and to adjust your insulin, medicines, or food intake as needed.  · Drink plenty of water while you exercise to prevent dehydration or heat stroke. Drink enough fluid to keep your  urine clear or pale yellow.  Summary  · Exercising regularly is important for your overall health, especially when you have diabetes (diabetes mellitus).  · Exercising has many health benefits, such as increasing muscle strength and bone density and reducing body fat and stress.  · Your health care provider or certified diabetes educator can help you make a plan for the type and frequency of exercise (activity plan) that works for you.  · When you start a new exercise or activity, work with your health care provider to make sure the activity is safe for you, and to adjust your insulin, medicines, or food intake as needed.  This information is not intended to replace advice given to you by your health care provider. Make sure you discuss any questions you have with your health care provider.  Document Released: 03/09/2005 Document Revised: 06/28/2018 Document Reviewed: 05/29/2017  [employer] Interactive Patient Education © 2019 [employer]    High Triglycerides Eating Plan  Triglycerides are a type of fat in the blood. High levels of triglycerides can increase your risk of heart disease and stroke. If your triglyceride levels are high, choosing the right foods can help lower your triglycerides and keep your heart healthy. Work with your health care provider or a diet and nutrition specialist (dietitian) to develop an eating plan that is right for you.  What are tips for following this plan?  General guidelines  · Lose weight, if you are overweight. For most people, losing 5-10 lbs (2-5 kg) helps lower triglyceride levels. A weight-loss plan may include.  ? 30 minutes of exercise at least 5 days a week.  ? Reducing the amount of calories, sugar, and fat you eat.  · Eat a wide variety of fresh fruits, vegetables, and whole grains. These foods are high in fiber.  · Eat foods that contain healthy fats, such as fatty fish, nuts, seeds, and olive oil.  · Avoid foods that are high in added sugar, added salt (sodium),  saturated fat, and trans fat.  · Avoid low-fiber, refined carbohydrates such as white bread, crackers, noodles, and white rice.  · Avoid foods with partially hydrogenated oils (trans fats), such as fried foods or stick margarine.  · Limit alcohol intake to no more than 1 drink a day for nonpregnant women and 2 drinks a day for men. One drink equals 12 oz of beer, 5 oz of wine, or 1½ oz of hard liquor. Your health care provider may recommend that you drink less depending on your overall health.  Reading food labels  · Check food labels for the amount of saturated fat. Choose foods with no or very little saturated fat.  · Check food labels for the amount of trans fat. Choose foods with no trans fat.  · Check food labels for the amount of cholesterol. Choose foods low in cholesterol. Ask your dietitian how much cholesterol you should have each day.  · Check food labels for the amount of sodium. Choose foods with less than 140 milligrams (mg) per serving.  Shopping  · Buy dairy products labeled as nonfat (skim) or low-fat (1%).  · Avoid buying processed or prepackaged foods. These are often high in added sugar, sodium, and fat.  Cooking  · Choose healthy fats when cooking, such as olive oil or canola oil.  · Cook foods using lower fat methods, such as baking, broiling, boiling, or grilling.  · Make your own sauces, dressings, and marinades when possible, instead of buying them. Store-bought sauces, dressings, and marinades are often high in sodium and sugar.  Meal planning  · Eat more home-cooked food and less restaurant, buffet, and fast food.  · Eat fatty fish at least 2 times each week. Examples of fatty fish include salmon, trout, mackerel, tuna, and herring.  · If you eat whole eggs, do not eat more than 3 egg yolks per week.  What foods are recommended?  The items listed may not be a complete list. Talk with your dietitian about what dietary choices are best for you.  Grains  Whole wheat or whole grain breads,  crackers, cereals, and pasta. Unsweetened oatmeal. Bulgur. Barley. Quinoa. Brown rice. Whole wheat flour tortillas.  Vegetables  Fresh or frozen vegetables. Low-sodium canned vegetables.  Fruits  All fresh, canned (in natural juice), or frozen fruits.  Meats and other protein foods  Skinless chicken or turkey. Ground chicken or turkey. Lean cuts of pork, trimmed of fat. Fish and seafood, especially salmon, trout, and herring. Egg whites. Dried beans, peas, or lentils. Unsalted nuts or seeds. Unsalted canned beans. Natural peanut or almond butter.  Dairy  Low-fat dairy products. Skim or low-fat (1%) milk. Reduced fat (2%) and low-sodium cheese. Low-fat ricotta cheese. Low-fat cottage cheese. Plain, low-fat yogurt.  Fats and oils  Tub margarine without trans fats. Light or reduced-fat mayonnaise. Light or reduced-fat salad dressings. Avocado. Safflower, olive, sunflower, soybean, and canola oils.  What foods are not recommended?  The items listed may not be a complete list. Talk with your dietitian about what dietary choices are best for you.  Grains  White bread. White (regular) pasta. White rice. Cornbread. Bagels. Pastries. Crackers that contain trans fat.  Vegetables  Creamed or fried vegetables. Vegetables in a cheese sauce.  Fruits  Sweetened dried fruit. Canned fruit in syrup. Fruit juice.  Meats and other protein foods  Fatty cuts of meat. Ribs. Chicken wings. [place]. Sausage. Bologna. Salami. Chitterlings. Fatback. Hot dogs. Bratwurst. Packaged lunch meats.  Dairy  Whole or reduced-fat (2%) milk. Half-and-half. Cream cheese. Full-fat or sweetened yogurt. Full-fat cheese. Nondairy creamers. Whipped toppings. Processed cheese or cheese spreads. Cheese curds.  Beverages  Alcohol. Sweetened drinks, such as soda, lemonade, fruit drinks, or punches.  Fats and oils  Butter. Stick margarine. Lard. Shortening. Ghee. [place] fat. Tropical oils, such as coconut, palm kernel, or palm oils.  Sweets and desserts  Corn syrup.  Sugars. Honey. Molasses. Candy. Jam and jelly. Syrup. Sweetened cereals. Cookies. Pies. Cakes. Donuts. Muffins. Ice cream.  Condiments  Store-bought sauces, dressings, and marinades that are high in sugar, such as ketchup and barbecue sauce.  Summary  · High levels of triglycerides can increase the risk of heart disease and stroke. Choosing the right foods can help lower your triglycerides.  · Eat plenty of fresh fruits, vegetables, and whole grains. Choose low-fat dairy and lean meats. Eat fatty fish at least twice a week.  · Avoid processed and prepackaged foods with added sugar, sodium, saturated fat, and trans fat.  · If you need suggestions or have questions about what types of food are good for you, talk with your health care provider or a dietitian.  This information is not intended to replace advice given to you by your health care provider. Make sure you discuss any questions you have with your health care provider.  Document Released: 10/05/2005 Document Revised: 02/20/2018 Document Reviewed: 02/20/2018  [employer] Patient Education © 2019 [employer]

## 2019-07-10 NOTE — PROGRESS NOTES
Patient Name: Hal Alexandre  :1965  53 y.o.    Date of Admission: 7/10/2019  Encounter Provider: Heide Dockery MD  Date of Encounter Visit: 07/10/19  Place of Service: [facility] CARDIOLOGY CARDIAC EVALUATION [facility]IN[facility]  Referring Provider: Elliott Lopez MD      Chief complaint:  Chest pain    History of Present Illness:    This is a nice gentleman with risk factors that include diabetes, hypertension, hyperlipidemia, obesity and inactivity. He ate dinner as usual, then had some sort of a Tumbleweed burrito as a bedtime snack. The next morning he had chest discomfort at rest. It occurred suddenly and he described it as a tightness with radiation into his left shoulder and into his left arm. There was no associated shortness of breath or diaphoresis. At its most severe it lasted 1-2 hours. He was starting to get better and he took some Tums. He did not notice any relief. He said it pretty much lasted all day and was still present when he went to bed last night. He is not having any pain this morning. He went to see Dr. Lopez and had an EKG which was normal and he was referred here for further evaluation. He is currently pain-free.      Past Medical History:   Diagnosis Date   • Diabetes mellitus (CMS/HCC)    • Disease of thyroid gland    • Hyperlipidemia    • Hypertension        Past Surgical History:   Procedure Laterality Date   • COLONOSCOPY N/A 2019    Procedure: COLONOSCOPY INTO CECUM AND TI;  Surgeon: Hong Collazo MD;  Location: [facility] ENDOSCOPY;  Service: Gastroenterology   • HEAD/NECK LACERATION REPAIR           Prior to Admission medications    Medication Sig Start Date End Date Taking? Authorizing Provider   amLODIPine (NORVASC) 10 MG tablet Take 1 tablet by mouth Daily for 180 days. For BP 5/2/19 10/29/19 Yes Sarah Chinchilla APRN   atorvastatin (LIPITOR) 40 MG tablet Take 1 tablet by mouth Daily for 180 days. For BP 5/2/19 10/29/19 Yes Amor  SOFIA Ambriz   irbesartan (AVAPRO) 300 MG tablet Take 1 tablet by mouth Daily. 19  Yes Sarah Chinchilla APRN   levothyroxine (SYNTHROID) 150 MCG tablet Take 1 tablet by mouth Daily. For thyroid; new dose 18  Yes Melodie Burnham PA-C   metFORMIN ER (GLUCOPHAGE-XR) 500 MG 24 hr tablet Take 4 tablets by mouth Daily for 180 days. With food for DMII 5/2/19 10/29/19 Yes Sarah Chinchilla APRN   pioglitazone (ACTOS) 15 MG tablet Take 1 tablet by mouth Daily. For DMII (new dose) 18  Yes Melodie Burnham PA-C   methylPREDNISolone (MEDROL, RUSS,) 4 MG tablet follow package directions 5/2/19 7/10/19  Sarah Chinchilla APRN       No Known Allergies    Social History     Socioeconomic History   • Marital status:      Spouse name: Not on file   • Number of children: Not on file   • Years of education: Not on file   • Highest education level: Not on file   Tobacco Use   • Smoking status: Former Smoker     Packs/day: 1.00     Years: 10.00     Pack years: 10.00     Types: Cigarettes     Last attempt to quit:      Years since quittin.5   • Smokeless tobacco: Never Used   Substance and Sexual Activity   • Alcohol use: Yes     Comment: weekend-significant   • Drug use: No   • Sexual activity: Yes     Partners: Female       Family History   Problem Relation Age of Onset   • Diabetes Mother    • Dementia Mother    • Cancer Father    • Cancer Brother        REVIEW OF SYSTEMS:   Review of Systems   Constitution: Negative for fever, malaise/fatigue, weight gain and weight loss.   HENT: Negative for ear pain, hearing loss, nosebleeds and sore throat.    Eyes: Negative for double vision, pain, vision loss in left eye and vision loss in right eye.   Cardiovascular:        See history of present illness.   Respiratory: Negative for cough, shortness of breath, sleep disturbances due to breathing, snoring and wheezing.    Endocrine: Negative for cold intolerance, heat intolerance and  "polyuria.   Skin: Negative for itching, poor wound healing and rash.   Musculoskeletal: Positive for joint pain. Negative for joint swelling and myalgias.   Gastrointestinal: Negative for abdominal pain, diarrhea, hematochezia, nausea and vomiting.   Genitourinary: Negative for hematuria and hesitancy.   Neurological: Negative for numbness, paresthesias and seizures.   Psychiatric/Behavioral: Negative for depression. The patient is not nervous/anxious.           Objective:     Vitals:    07/10/19 1247   BP: 136/90   BP Location: Right arm   Patient Position: Sitting   Pulse: 83   Resp: 16   SpO2: 93%   Weight: 113 kg (250 lb)   Height: 175.3 cm (69\")     Body mass index is 36.92 kg/m².    Physical Exam   Constitutional: He appears well-developed.   HENT:   Head: Normocephalic and atraumatic.   Eyes: Conjunctivae and lids are normal. Pupils are equal, round, and reactive to light. Lids are everted and swept, no foreign bodies found.   Neck: Normal range of motion. No JVD present. Carotid bruit is not present. No tracheal deviation present. No thyroid mass present.   Cardiovascular: Normal rate, regular rhythm and normal heart sounds.   Pulses:       Dorsalis pedis pulses are 2+ on the right side, and 2+ on the left side.   Pulmonary/Chest: Effort normal and breath sounds normal.   Abdominal: Normal appearance and bowel sounds are normal.   Musculoskeletal: Normal range of motion.   Neurological: He is alert. He has normal strength.   Skin: Skin is warm, dry and intact.   Psychiatric: He has a normal mood and affect. His behavior is normal.   Vitals reviewed.        Lab Review:     Results from last 7 days   Lab Units 07/10/19  1233   SODIUM mmol/L 140   POTASSIUM mmol/L 4.0   CHLORIDE mmol/L 104   CO2 mmol/L 23.5   BUN mg/dL 14   CREATININE mg/dL 0.96   CALCIUM mg/dL 9.3   BILIRUBIN mg/dL 0.3   ALK PHOS U/L 108   ALT (SGPT) U/L 32   AST (SGOT) U/L 19   GLUCOSE mg/dL 175*     Results from last 7 days   Lab Units " 07/10/19  1233   TROPONIN T ng/mL <0.010     Results from last 7 days   Lab Units 07/10/19  1233   WBC 10*3/mm3 6.00   HEMOGLOBIN g/dL 14.6   HEMATOCRIT % 44.7   PLATELETS 10*3/mm3 346                   Procedures    I personally reviewed the EKG from Dr Lopez's office    Assessment and Plan:       1. Chest pain. Certainly his symptoms are concerning especially when compounded with his risk factors. I am waiting for his troponin to come back. If that is normal, I recommend that we do a stress echocardiogram today. If he rules in, then I will admit him to the hospital for heart catheterization.   2. Hypertension. Controlled.   3. Hyperlipidemia. On atorvastatin.  4. Diabetes.  5. Obesity.   6. Inactivity. He really needs to start to exercise on a regular basis.  7. Former smoker.    Troponin was negative.  Echo was normal.  He did fine on the treadmill with no EKG changes.  Stress echo was normal.  Our [facility] nurses are going to do some lifestyle modification education.  I will have him follow-up with his primary doctor and come back and see me if he has recurrent symptoms.    Heide Dockery MD  07/10/19  3:07 PM

## 2019-07-10 NOTE — PROGRESS NOTES
Rooming Tab(CC,VS,Pt Hx,Fall Screen)  Chief Complaint   Patient presents with   • Chest Pain     (about 2 days ) sated tightness in shoulder as well (stated he had tums )       Subjective   Patient presents the office with chief complaint of chest pain that began yesterday morning while he was turning on his computer.  Pain was described as a tightness that radiated into the left shoulder and was not associated with any sweats.  He did have some heartburn sensations for the remainder of the day.  The chest tightness abated after about 60 minutes.  He was not really short of breath but it was hard to catch his breath due to the pain.  He did wake up this morning feeling better.  At the encouragement of his wife he is in today for evaluation.  He has a laser procedure on his gums later this afternoon which he will cancel.  I have reviewed and updated his medications, medical history and problem list during today's office visit.     Patient Care Team:  Elliott Lopez MD as PCP - General (Family Medicine)  Jonnie Guo MD as Surgeon (Orthopedic Surgery)    Problem List Tab  Medications Tab  Synopsis Tab  Chart Review Tab  Care Everywhere Tab  Immunizations Tab  Patient History Tab    Social History     Tobacco Use   • Smoking status: Former Smoker     Packs/day: 1.00     Years: 10.00     Pack years: 10.00     Types: Cigarettes     Last attempt to quit:      Years since quittin.5   • Smokeless tobacco: Never Used   Substance Use Topics   • Alcohol use: Yes     Comment: weekend       Review of Systems   Constitutional: Positive for fatigue.   Respiratory: Negative for shortness of breath.    Cardiovascular: Negative for chest pain.   Gastrointestinal: Negative for indigestion.   Neurological: Negative for dizziness and light-headedness.   All other systems reviewed and are negative.      Objective     Rooming Tab(CC,VS,Pt Hx,Fall Screen)  /98   Pulse 79   Temp 97.7 °F (36.5 °C) (Oral)   Resp  "16   Ht 175.3 cm (69.02\")   Wt 112 kg (246 lb) Comment: refuse  SpO2 97%   BMI 36.31 kg/m²     Body mass index is 36.31 kg/m².    Physical Exam   Constitutional: He is oriented to person, place, and time. He appears well-developed. No distress.   Eyes: Conjunctivae and lids are normal.   Neck: Carotid bruit is not present.   Cardiovascular: Normal rate, regular rhythm and normal heart sounds.   Pulmonary/Chest: Effort normal and breath sounds normal.   Neurological: He is alert and oriented to person, place, and time.   Skin: Skin is warm and dry.   Psychiatric: He has a normal mood and affect. His behavior is normal.   Vitals reviewed.       Statin Decision Aid  Data Reviewed:  Xray test ordered: CXR  Views: lateral and posterior-anterior    Relevant Clinical Issues/Diagnoses/Indications for XRay: see HPI  Clinical Findings: Chest: was negative for infiltrate, effusion, pneumothorax, or wide mediastinum    Compared with previous XRay? no    Date of Previous Xray:No previous xray available for comparison    Changes on current Xray? not applicable        ECG 12 Lead  Date/Time: 7/10/2019 11:33 AM  Performed by: Elliott Lopez MD  Authorized by: Elliott Lopez MD   Comparison: not compared with previous ECG   Previous ECG: no previous ECG available  Rhythm: sinus rhythm  Rate: normal  BPM: 87  Conduction: left anterior fascicular block  ST Flattening: V1  QRS axis: left    Clinical impression: abnormal EKG                  Lab Results   Component Value Date     (H) 05/03/2019    BUN 15 05/03/2019    CREATININE 0.94 05/03/2019    EGFRIFNONA 84 05/03/2019    EGFRIFAFRI 102 05/03/2019     05/03/2019    K 5.1 05/03/2019     05/03/2019    CALCIUM 10.4 05/03/2019    ALBUMIN 4.60 05/03/2019    BILITOT 0.4 05/03/2019    ALKPHOS 129 (H) 05/03/2019    AST 14 05/03/2019    ALT 31 05/03/2019    CHLPL 195 05/03/2019    TRIG 115 05/03/2019    HDL 53 05/03/2019    VLDL 23 05/03/2019     (H) 05/03/2019 "    WBC 8.15 05/03/2019    RBC 5.43 05/03/2019    HCT 48.4 05/03/2019    MCV 89.1 05/03/2019    MCH 27.8 05/03/2019    TSH 4.860 (H) 05/03/2019    FREET4 1.53 05/03/2019    PSA 1.190 05/03/2019    URICACID 6.8 05/03/2019      Assessment/Plan   Order Review Tab  Health Maintenance Tab  Patient Plan/Order Tab  Diagnoses and all orders for this visit:    1. Chest pain, unspecified type (Primary)  Comments:  Referral to chest pain clinic today.  All other plans canceled at my direction.  Orders:  -     XR Chest PA & Lateral  -     Ambulatory Referral Chest Pain Clinic    Other orders  -     Cancel: XR Chest PA & Lateral; Future  -     ECG 12 Lead        Wrapup Tab  Return if symptoms worsen or fail to improve.

## 2019-10-16 ENCOUNTER — CLINICAL SUPPORT (OUTPATIENT)
Dept: FAMILY MEDICINE CLINIC | Facility: CLINIC | Age: 54
End: 2019-10-16

## 2019-10-16 DIAGNOSIS — Z23 FLU VACCINE NEED: ICD-10-CM

## 2019-10-16 DIAGNOSIS — Z23 IMMUNIZATION DUE: Primary | ICD-10-CM

## 2019-10-16 PROCEDURE — 90674 CCIIV4 VAC NO PRSV 0.5 ML IM: CPT | Performed by: FAMILY MEDICINE

## 2019-10-16 PROCEDURE — 90471 IMMUNIZATION ADMIN: CPT | Performed by: FAMILY MEDICINE

## 2019-11-19 DIAGNOSIS — E11.9 CONTROLLED TYPE 2 DIABETES MELLITUS WITHOUT COMPLICATION, WITHOUT LONG-TERM CURRENT USE OF INSULIN (HCC): ICD-10-CM

## 2019-11-19 DIAGNOSIS — I10 ESSENTIAL HYPERTENSION: ICD-10-CM

## 2019-11-19 DIAGNOSIS — E78.49 OTHER HYPERLIPIDEMIA: Primary | ICD-10-CM

## 2019-11-19 DIAGNOSIS — E78.49 OTHER HYPERLIPIDEMIA: ICD-10-CM

## 2019-11-19 DIAGNOSIS — E03.9 ACQUIRED HYPOTHYROIDISM: ICD-10-CM

## 2019-11-19 RX ORDER — LEVOTHYROXINE SODIUM 0.15 MG/1
TABLET ORAL
Qty: 90 TABLET | Refills: 0 | Status: SHIPPED | OUTPATIENT
Start: 2019-11-19 | End: 2020-01-29 | Stop reason: SDUPTHER

## 2019-11-19 RX ORDER — PIOGLITAZONEHYDROCHLORIDE 15 MG/1
15 TABLET ORAL DAILY
Qty: 30 TABLET | Refills: 0 | Status: SHIPPED | OUTPATIENT
Start: 2019-11-19 | End: 2019-12-30 | Stop reason: SDUPTHER

## 2019-11-20 DIAGNOSIS — I10 ESSENTIAL HYPERTENSION: ICD-10-CM

## 2019-11-20 DIAGNOSIS — E11.9 CONTROLLED TYPE 2 DIABETES MELLITUS WITHOUT COMPLICATION, WITHOUT LONG-TERM CURRENT USE OF INSULIN (HCC): ICD-10-CM

## 2019-11-20 DIAGNOSIS — E78.49 OTHER HYPERLIPIDEMIA: ICD-10-CM

## 2019-11-20 RX ORDER — AMLODIPINE BESYLATE 10 MG/1
TABLET ORAL
Qty: 30 TABLET | Refills: 0 | Status: SHIPPED | OUTPATIENT
Start: 2019-11-20 | End: 2019-12-30 | Stop reason: SDUPTHER

## 2019-11-20 RX ORDER — METFORMIN HYDROCHLORIDE 500 MG/1
2000 TABLET, EXTENDED RELEASE ORAL
Qty: 360 TABLET | Refills: 0 | Status: SHIPPED | OUTPATIENT
Start: 2019-11-20 | End: 2019-12-30 | Stop reason: SDUPTHER

## 2019-11-21 RX ORDER — ATORVASTATIN CALCIUM 40 MG/1
40 TABLET, FILM COATED ORAL DAILY
Qty: 30 TABLET | Refills: 0 | Status: SHIPPED | OUTPATIENT
Start: 2019-11-21 | End: 2019-12-30 | Stop reason: SDUPTHER

## 2019-12-30 DIAGNOSIS — E78.49 OTHER HYPERLIPIDEMIA: ICD-10-CM

## 2019-12-30 DIAGNOSIS — E11.9 CONTROLLED TYPE 2 DIABETES MELLITUS WITHOUT COMPLICATION, WITHOUT LONG-TERM CURRENT USE OF INSULIN (HCC): ICD-10-CM

## 2019-12-30 DIAGNOSIS — I10 ESSENTIAL HYPERTENSION: ICD-10-CM

## 2019-12-30 RX ORDER — AMLODIPINE BESYLATE 10 MG/1
10 TABLET ORAL DAILY
Qty: 30 TABLET | Refills: 0 | Status: SHIPPED | OUTPATIENT
Start: 2019-12-30 | End: 2020-01-29 | Stop reason: SDUPTHER

## 2019-12-30 RX ORDER — IRBESARTAN 300 MG/1
300 TABLET ORAL DAILY
Qty: 30 TABLET | Refills: 0 | Status: SHIPPED | OUTPATIENT
Start: 2019-12-30 | End: 2020-01-28

## 2019-12-30 RX ORDER — PIOGLITAZONEHYDROCHLORIDE 15 MG/1
15 TABLET ORAL DAILY
Qty: 30 TABLET | Refills: 0 | Status: SHIPPED | OUTPATIENT
Start: 2019-12-30 | End: 2020-01-29 | Stop reason: SDUPTHER

## 2019-12-30 RX ORDER — METFORMIN HYDROCHLORIDE 500 MG/1
2000 TABLET, EXTENDED RELEASE ORAL
Qty: 120 TABLET | Refills: 0 | Status: SHIPPED | OUTPATIENT
Start: 2019-12-30 | End: 2020-01-29 | Stop reason: SDUPTHER

## 2019-12-30 RX ORDER — ATORVASTATIN CALCIUM 40 MG/1
40 TABLET, FILM COATED ORAL DAILY
Qty: 30 TABLET | Refills: 0 | Status: SHIPPED | OUTPATIENT
Start: 2019-12-30 | End: 2020-01-29 | Stop reason: SDUPTHER

## 2020-01-18 LAB
25(OH)D3+25(OH)D2 SERPL-MCNC: 11.8 NG/ML (ref 30–100)
ALBUMIN SERPL-MCNC: 4.7 G/DL (ref 3.5–5.5)
ALBUMIN/CREAT UR: 45.1 MG/G CREAT (ref 0–30)
ALBUMIN/GLOB SERPL: 1.6 {RATIO} (ref 1.2–2.2)
ALP SERPL-CCNC: 127 IU/L (ref 39–117)
ALT SERPL-CCNC: 39 IU/L (ref 0–44)
AST SERPL-CCNC: 26 IU/L (ref 0–40)
BASOPHILS # BLD AUTO: 0.1 X10E3/UL (ref 0–0.2)
BASOPHILS NFR BLD AUTO: 1 %
BILIRUB SERPL-MCNC: 0.3 MG/DL (ref 0–1.2)
BUN SERPL-MCNC: 15 MG/DL (ref 6–24)
BUN/CREAT SERPL: 14 (ref 9–20)
CALCIUM SERPL-MCNC: 10.1 MG/DL (ref 8.7–10.2)
CHLORIDE SERPL-SCNC: 103 MMOL/L (ref 96–106)
CHOLEST SERPL-MCNC: 196 MG/DL (ref 100–199)
CO2 SERPL-SCNC: 21 MMOL/L (ref 20–29)
CREAT SERPL-MCNC: 1.1 MG/DL (ref 0.76–1.27)
CREAT UR-MCNC: 164.5 MG/DL
EOSINOPHIL # BLD AUTO: 0.4 X10E3/UL (ref 0–0.4)
EOSINOPHIL NFR BLD AUTO: 5 %
ERYTHROCYTE [DISTWIDTH] IN BLOOD BY AUTOMATED COUNT: 13.9 % (ref 11.6–15.4)
FOLATE SERPL-MCNC: 12.7 NG/ML
GLOBULIN SER CALC-MCNC: 2.9 G/DL (ref 1.5–4.5)
GLUCOSE SERPL-MCNC: 158 MG/DL (ref 65–99)
HBA1C MFR BLD: 7.9 % (ref 4.8–5.6)
HCT VFR BLD AUTO: 45 % (ref 37.5–51)
HDLC SERPL-MCNC: 44 MG/DL
HGB BLD-MCNC: 15.6 G/DL (ref 13–17.7)
IMM GRANULOCYTES # BLD AUTO: 0.1 X10E3/UL (ref 0–0.1)
IMM GRANULOCYTES NFR BLD AUTO: 1 %
LDLC SERPL CALC-MCNC: 122 MG/DL (ref 0–99)
LYMPHOCYTES # BLD AUTO: 2.4 X10E3/UL (ref 0.7–3.1)
LYMPHOCYTES NFR BLD AUTO: 28 %
MCH RBC QN AUTO: 28.5 PG (ref 26.6–33)
MCHC RBC AUTO-ENTMCNC: 34.7 G/DL (ref 31.5–35.7)
MCV RBC AUTO: 82 FL (ref 79–97)
MICROALBUMIN UR-MCNC: 74.2 UG/ML
MONOCYTES # BLD AUTO: 0.8 X10E3/UL (ref 0.1–0.9)
MONOCYTES NFR BLD AUTO: 9 %
NEUTROPHILS # BLD AUTO: 4.7 X10E3/UL (ref 1.4–7)
NEUTROPHILS NFR BLD AUTO: 56 %
PLATELET # BLD AUTO: 429 X10E3/UL (ref 150–450)
POTASSIUM SERPL-SCNC: 5.2 MMOL/L (ref 3.5–5.2)
PROT SERPL-MCNC: 7.6 G/DL (ref 6–8.5)
RBC # BLD AUTO: 5.47 X10E6/UL (ref 4.14–5.8)
SODIUM SERPL-SCNC: 140 MMOL/L (ref 134–144)
T3FREE SERPL-MCNC: 2.7 PG/ML (ref 2–4.4)
T4 FREE SERPL-MCNC: 1.47 NG/DL (ref 0.82–1.77)
TRIGL SERPL-MCNC: 149 MG/DL (ref 0–149)
TSH SERPL DL<=0.005 MIU/L-ACNC: 6.09 UIU/ML (ref 0.45–4.5)
VIT B12 SERPL-MCNC: 830 PG/ML (ref 232–1245)
VLDLC SERPL CALC-MCNC: 30 MG/DL (ref 5–40)
WBC # BLD AUTO: 8.4 X10E3/UL (ref 3.4–10.8)

## 2020-01-27 DIAGNOSIS — E78.49 OTHER HYPERLIPIDEMIA: ICD-10-CM

## 2020-01-27 DIAGNOSIS — E11.9 CONTROLLED TYPE 2 DIABETES MELLITUS WITHOUT COMPLICATION, WITHOUT LONG-TERM CURRENT USE OF INSULIN (HCC): ICD-10-CM

## 2020-01-27 DIAGNOSIS — I10 ESSENTIAL HYPERTENSION: ICD-10-CM

## 2020-01-28 RX ORDER — IRBESARTAN 300 MG/1
TABLET ORAL
Qty: 30 TABLET | Refills: 0 | Status: SHIPPED | OUTPATIENT
Start: 2020-01-28 | End: 2020-01-29 | Stop reason: SDUPTHER

## 2020-01-29 ENCOUNTER — OFFICE VISIT (OUTPATIENT)
Dept: FAMILY MEDICINE CLINIC | Facility: CLINIC | Age: 55
End: 2020-01-29

## 2020-01-29 VITALS
OXYGEN SATURATION: 97 % | SYSTOLIC BLOOD PRESSURE: 125 MMHG | RESPIRATION RATE: 16 BRPM | BODY MASS INDEX: 39.69 KG/M2 | HEIGHT: 69 IN | HEART RATE: 87 BPM | DIASTOLIC BLOOD PRESSURE: 81 MMHG | WEIGHT: 268 LBS

## 2020-01-29 DIAGNOSIS — B35.1 ONYCHOMYCOSIS OF GREAT TOE: ICD-10-CM

## 2020-01-29 DIAGNOSIS — I10 ESSENTIAL HYPERTENSION: Primary | ICD-10-CM

## 2020-01-29 DIAGNOSIS — L60.0 INGROWN TOENAIL OF RIGHT FOOT: ICD-10-CM

## 2020-01-29 DIAGNOSIS — E03.9 ACQUIRED HYPOTHYROIDISM: ICD-10-CM

## 2020-01-29 DIAGNOSIS — E11.65 TYPE 2 DIABETES MELLITUS WITH HYPERGLYCEMIA, WITHOUT LONG-TERM CURRENT USE OF INSULIN (HCC): ICD-10-CM

## 2020-01-29 DIAGNOSIS — E55.9 VITAMIN D DEFICIENCY: ICD-10-CM

## 2020-01-29 DIAGNOSIS — E78.49 OTHER HYPERLIPIDEMIA: ICD-10-CM

## 2020-01-29 PROBLEM — Z12.11 SCREENING FOR MALIGNANT NEOPLASM OF COLON: Status: RESOLVED | Noted: 2019-01-07 | Resolved: 2020-01-29

## 2020-01-29 PROCEDURE — 99214 OFFICE O/P EST MOD 30 MIN: CPT | Performed by: FAMILY MEDICINE

## 2020-01-29 RX ORDER — LEVOTHYROXINE SODIUM 175 UG/1
175 TABLET ORAL DAILY
Qty: 90 TABLET | Refills: 0 | Status: SHIPPED | OUTPATIENT
Start: 2020-01-29 | End: 2020-04-29 | Stop reason: SDUPTHER

## 2020-01-29 RX ORDER — AMLODIPINE BESYLATE 10 MG/1
10 TABLET ORAL DAILY
Qty: 90 TABLET | Refills: 0 | Status: SHIPPED | OUTPATIENT
Start: 2020-01-29 | End: 2020-04-29 | Stop reason: SDUPTHER

## 2020-01-29 RX ORDER — ERGOCALCIFEROL 1.25 MG/1
50000 CAPSULE ORAL
Qty: 12 CAPSULE | Refills: 0 | Status: SHIPPED | OUTPATIENT
Start: 2020-01-29 | End: 2020-04-29 | Stop reason: SDUPTHER

## 2020-01-29 RX ORDER — EZETIMIBE 10 MG/1
10 TABLET ORAL NIGHTLY
Qty: 90 TABLET | Refills: 0 | Status: SHIPPED | OUTPATIENT
Start: 2020-01-29 | End: 2020-04-29 | Stop reason: SDUPTHER

## 2020-01-29 RX ORDER — ATORVASTATIN CALCIUM 40 MG/1
40 TABLET, FILM COATED ORAL DAILY
Qty: 90 TABLET | Refills: 0 | Status: SHIPPED | OUTPATIENT
Start: 2020-01-29 | End: 2020-04-29 | Stop reason: SDUPTHER

## 2020-01-29 RX ORDER — PIOGLITAZONEHYDROCHLORIDE 30 MG/1
30 TABLET ORAL DAILY
Qty: 90 TABLET | Refills: 0 | Status: SHIPPED | OUTPATIENT
Start: 2020-01-29 | End: 2020-04-29 | Stop reason: SDUPTHER

## 2020-01-29 RX ORDER — METFORMIN HYDROCHLORIDE 500 MG/1
2000 TABLET, EXTENDED RELEASE ORAL
Qty: 360 TABLET | Refills: 0 | Status: SHIPPED | OUTPATIENT
Start: 2020-01-29 | End: 2020-04-29 | Stop reason: SDUPTHER

## 2020-01-29 RX ORDER — IRBESARTAN 300 MG/1
300 TABLET ORAL DAILY
Qty: 90 TABLET | Refills: 0 | Status: SHIPPED | OUTPATIENT
Start: 2020-01-29 | End: 2020-04-29 | Stop reason: SDUPTHER

## 2020-01-29 NOTE — ASSESSMENT & PLAN NOTE
Lipid abnormalities are unchanged.  Pharmacotherapy as ordered.  Add Zetia  Lipids will be reassessed in 3 months.

## 2020-01-29 NOTE — ASSESSMENT & PLAN NOTE
Diabetes is worsening.   Dietary recommendations for ADA diet.  Medication changes per orders.  Diabetes will be reassessed in 3 months.

## 2020-01-29 NOTE — PROGRESS NOTES
"   Subjective       Chief Complaint   Patient presents with   • Hypertension         HPI:       Hal Alexandre is a 54 y.o. male who presents to the office today to review labs and refill medicines.  Blood pressure control.  Lipids not to goal.  Diabetes is shown interval worsening.  Labs showed low vitamin D.  He has symptoms of fatigue.  He is also not therapeutic with his current thyroid medication.  He has had interval weight gain and is back with exercising and attending weight watchers.  Diet discussed during today's visit.    I have reviewed and updated his medications, medical history and problem list during today's office visit.     Social History     Tobacco Use   • Smoking status: Former Smoker     Packs/day: 1.00     Years: 10.00     Pack years: 10.00     Types: Cigarettes     Last attempt to quit:      Years since quittin.0   • Smokeless tobacco: Never Used   Substance Use Topics   • Alcohol use: Yes     Comment: weekend-significant       Review of Systems   Constitutional: Negative for fatigue.   Cardiovascular: Negative for chest pain.         PE:   Objective   /81   Pulse 87   Resp 16   Ht 175.3 cm (69\")   Wt 122 kg (268 lb)   SpO2 97%   BMI 39.58 kg/m²     Body mass index is 39.58 kg/m².    Physical Exam   Constitutional: He is oriented to person, place, and time. He appears well-developed. No distress.   Eyes: Conjunctivae and lids are normal.   Neck: Carotid bruit is not present.   Cardiovascular: Normal rate, regular rhythm and normal heart sounds.   Pulmonary/Chest: Effort normal and breath sounds normal.   Neurological: He is alert and oriented to person, place, and time.   Skin: Skin is warm and dry.   Psychiatric: He has a normal mood and affect. His behavior is normal.   Vitals reviewed.       Data Reviewed:              Lab Results   Component Value Date     (H) 2020    BUN 15 2020    CREATININE 1.10 2020    EGFRIFNONA 76 2020    EGFRIFAFRI " 88 01/17/2020     01/17/2020    K 5.2 01/17/2020     01/17/2020    CALCIUM 10.1 01/17/2020    ALBUMIN 4.7 01/17/2020    BILITOT 0.3 01/17/2020    ALKPHOS 127 (H) 01/17/2020    AST 26 01/17/2020    ALT 39 01/17/2020    CHLPL 196 01/17/2020    TRIG 149 01/17/2020    HDL 44 01/17/2020    VLDL 30 01/17/2020     (H) 01/17/2020    MICROALBUR 74.2 01/17/2020    WBC 8.4 01/17/2020    RBC 5.47 01/17/2020    HCT 45.0 01/17/2020    MCV 82 01/17/2020    MCH 28.5 01/17/2020    TSH 6.090 (H) 01/17/2020    FREET4 1.47 01/17/2020    [id number] 11.8 (L) 01/17/2020          A/P:     Assessment/Plan   Diagnoses and all orders for this visit:    1. Essential hypertension (Primary)  Assessment & Plan:  Hypertension is unchanged.  Continue current treatment regimen.  Blood pressure will be reassessed at the next regular appointment.    Orders:  -     amLODIPine (NORVASC) 10 MG tablet; Take 1 tablet by mouth Daily for 90 days. for blood pressure  Dispense: 90 tablet; Refill: 0  -     irbesartan (AVAPRO) 300 MG tablet; Take 1 tablet by mouth Daily for 90 days.  Dispense: 90 tablet; Refill: 0  -     Comprehensive Metabolic Panel; Future  -     CBC & Differential; Future    2. Other hyperlipidemia  Assessment & Plan:  Lipid abnormalities are unchanged.  Pharmacotherapy as ordered.  Add Zetia  Lipids will be reassessed in 3 months.    Orders:  -     atorvastatin (LIPITOR) 40 MG tablet; Take 1 tablet by mouth Daily for 90 days.  Dispense: 90 tablet; Refill: 0  -     ezetimibe (ZETIA) 10 MG tablet; Take 1 tablet by mouth Every Night for 90 days.  Dispense: 90 tablet; Refill: 0  -     Lipid Panel With / Chol / HDL Ratio; Future  -     CK; Future    3. Acquired hypothyroidism  -     levothyroxine (SYNTHROID, LEVOTHROID) 175 MCG tablet; Take 1 tablet by mouth Daily for 90 days.  Dispense: 90 tablet; Refill: 0  -     TSH; Future    4. Type 2 diabetes mellitus with hyperglycemia, without long-term current use of insulin  (CMS/[facility])  Assessment & Plan:  Diabetes is worsening.   Dietary recommendations for ADA diet.  Medication changes per orders.  Diabetes will be reassessed in 3 months.    Orders:  -     metFORMIN ER (GLUCOPHAGE-XR) 500 MG 24 hr tablet; Take 4 tablets by mouth Daily With Breakfast for 90 days.  Dispense: 360 tablet; Refill: 0  -     pioglitazone (ACTOS) 30 MG tablet; Take 1 tablet by mouth Daily for 90 days. For DMII; needs labs!!!!!!!  30 day Rx; lab order sent and make appt  Dispense: 90 tablet; Refill: 0  -     Comprehensive Metabolic Panel; Future  -     Hemoglobin A1c; Future  -     MicroAlbumin, Urine, Random - Urine, Clean Catch; Future    5. Vitamin D deficiency  -     vitamin D (ERGOCALCIFEROL) 1.25 MG (91159 UT) capsule capsule; Take 1 capsule by mouth Every 7 (Seven) Days for 90 days.  Dispense: 12 capsule; Refill: 0  -     Vitamin D 25 Hydroxy; Future    6. Onychomycosis of great toe  Assessment & Plan:  New problem refer to podiatrist    Orders:  -     Ambulatory Referral to Podiatry    7. Ingrown toenail of right foot  Assessment & Plan:  New problem.  Referral to podiatry.    Orders:  -     Ambulatory Referral to Podiatry    Patient Instructions           Follow up:    Return in about 3 months (around 4/29/2020) for Recheck.

## 2020-02-19 ENCOUNTER — TELEPHONE (OUTPATIENT)
Dept: FAMILY MEDICINE CLINIC | Facility: CLINIC | Age: 55
End: 2020-02-19

## 2020-02-19 NOTE — TELEPHONE ENCOUNTER
Pt keeps asking us and had [facility] MRI call asking for MRI order. Pt told to get order from Ortho.

## 2020-02-27 ENCOUNTER — OFFICE VISIT (OUTPATIENT)
Dept: ORTHOPEDIC SURGERY | Facility: CLINIC | Age: 55
End: 2020-02-27

## 2020-02-27 VITALS — BODY MASS INDEX: 40.43 KG/M2 | HEIGHT: 69 IN | WEIGHT: 273 LBS | TEMPERATURE: 97.9 F

## 2020-02-27 DIAGNOSIS — M94.261 CHONDROMALACIA OF RIGHT KNEE: ICD-10-CM

## 2020-02-27 DIAGNOSIS — S83.241A TEAR OF MEDIAL MENISCUS OF RIGHT KNEE, CURRENT, UNSPECIFIED TEAR TYPE, INITIAL ENCOUNTER: Primary | ICD-10-CM

## 2020-02-27 DIAGNOSIS — G89.29 CHRONIC PAIN OF RIGHT KNEE: ICD-10-CM

## 2020-02-27 DIAGNOSIS — M25.561 CHRONIC PAIN OF RIGHT KNEE: ICD-10-CM

## 2020-02-27 PROCEDURE — 99213 OFFICE O/P EST LOW 20 MIN: CPT | Performed by: ORTHOPAEDIC SURGERY

## 2020-02-27 PROCEDURE — 73562 X-RAY EXAM OF KNEE 3: CPT | Performed by: ORTHOPAEDIC SURGERY

## 2020-02-27 NOTE — PROGRESS NOTES
"Knee Exam      Patient: Hal Alexandre    YOB: 1965 54 y.o. male    Chief Complaints: knee hurts    History of Present Illness: Patient was last seen on 6/29/2017.  At that time he reported injuring his right knee 22 years prior to that when playing basketball and some light on the outer part of his knee forcing it inward.    At that time he reported moderate to severe intermittent aching pain in the right knee and feeling that would go out on him and that he would wedge his foot under something and twist and pull on his knee until it popped and felt better.    At that time he was instructed on getting an MRI and never did due to his daughter's wedding and has continued to have moderate to severe aching pain isolates mainly over the medial more so the lateral aspect of the right knee and up until 2 months ago he is been able to relieve this again by waiting his foot under something and twisting his knee vigorously.    HPI    ROS: knee pain  Past Medical History:   Diagnosis Date   • Controlled type 2 diabetes mellitus without complication, with long-term current use of insulin (CMS/[facility]) 4/27/2016   • Diabetes mellitus (CMS/[facility])    • Disease of thyroid gland    • Hyperlipidemia    • Hypertension    • Screening for malignant neoplasm of colon 1/7/2019    Added automatically from request for surgery [id number]       Physical Exam:   Vitals:    02/27/20 1029   Temp: 97.9 °F (36.6 °C)   Weight: 124 kg (273 lb)   Height: 175.3 cm (69\")   PainSc:   5     Well developed with normal moodHe is ambulating without assistive device.  Right knee shows mild effusion no warmth erythema there is moderate discomfort of the medial more so than lateral joint line exacerbated with Hui's.  0 to 118 degrees range of motion and mild discomfort on patellofemoral compression.  Grossly stable ligamentous exam.  Right calf and thigh are nontender without sign of DVT      Radiology: 3 views of the right knee ordered evaluate " reviewed and compared with x-rays from June 2017.  I do not see appreciable interval change there is some arthritic change in the right knee with medial joint space narrowing more so than laterally with some sharpening of the tibial spines.  No gross malalignment or evidence of fracture.  There is some spurring over the superior aspect of the patella that appears chronic.      Assessment/Plan: 1.  Right knee unstable meniscus tear  2.  Right knee chondromalacia    Reviewed treatment options with him and we need to go ahead and get the MRI that we discussed several years ago to get a further evaluation of his right knee pathology and to help with preoperative planning as he is likely going to need to have at least a knee arthroscopy.    In the interim he will avoid kneeling squatting twisting activities everything worsens to let me know.    He understands to call to schedule follow-up appointment after MRI has been scheduled in order to review results in the office

## 2020-03-05 ENCOUNTER — OFFICE VISIT (OUTPATIENT)
Dept: ORTHOPEDIC SURGERY | Facility: CLINIC | Age: 55
End: 2020-03-05

## 2020-03-05 VITALS — WEIGHT: 273 LBS | TEMPERATURE: 97.2 F | BODY MASS INDEX: 40.43 KG/M2 | HEIGHT: 69 IN

## 2020-03-05 DIAGNOSIS — S83.511D RUPTURE OF ANTERIOR CRUCIATE LIGAMENT OF RIGHT KNEE, SUBSEQUENT ENCOUNTER: Primary | ICD-10-CM

## 2020-03-05 DIAGNOSIS — M94.261 CHONDROMALACIA OF RIGHT KNEE: ICD-10-CM

## 2020-03-05 DIAGNOSIS — S83.241D TEAR OF MEDIAL MENISCUS OF RIGHT KNEE, CURRENT, UNSPECIFIED TEAR TYPE, SUBSEQUENT ENCOUNTER: ICD-10-CM

## 2020-03-05 PROCEDURE — 99213 OFFICE O/P EST LOW 20 MIN: CPT | Performed by: ORTHOPAEDIC SURGERY

## 2020-03-05 NOTE — PROGRESS NOTES
"Knee Exam      Patient: Hal Alexandre    YOB: 1965 54 y.o. male    Chief Complaints: knee hurts    History of Present Illness:Patient wasseen on 6/29/2017.  At that time he reported injuring his right knee 22 years prior to that when playing basketball and some light on the outer part of his knee forcing it inward.     At that time he reported moderate to severe intermittent aching pain in the right knee and feeling that would go out on him and that he would wedge his foot under something and twist and pull on his knee until it popped and felt better.     At that time he was instructed on getting an MRI and never did due to his daughter's wedding and he was most recently seen on 2/27/2020 and has continued to have moderate to severe aching pain isolates mainly over the medial more so the lateral aspect of the right knee and up until 2 months ago he is been able to relieve this again by wedging his foot under something and twisting his knee vigorously.    He was sent for MRI and is seen back today with persistent complaints of pain in his right knee that is mainly over the medial aspect but also some laterally.  He states that the knee feels like it wants to pop and he cannot pop it and feels like it it is remaining like it feels stuck but feels like it wants to give out on him.        HPI    ROS: knee pain  Past Medical History:   Diagnosis Date   • Controlled type 2 diabetes mellitus without complication, with long-term current use of insulin (CMS/[facility]) 4/27/2016   • Diabetes mellitus (CMS/[facility])    • Disease of thyroid gland    • Hyperlipidemia    • Hypertension    • Screening for malignant neoplasm of colon 1/7/2019    Added automatically from request for surgery [id number]       Physical Exam:   Vitals:    03/05/20 0935   Temp: 97.2 °F (36.2 °C)   TempSrc: Temporal   Weight: 124 kg (273 lb)   Height: 175.3 cm (69\")   PainSc:   6   PainLoc: Knee     Well developed with normal mood.  He is ambulating " "without assistive device.  Right knee shows no warmth erythema there is mild effusion.  0 to 118 degrees range of motion.  There is moderate discomfort over the medial much more so the lateral joint line exacerbated with Hui's.  He still had a grossly stable ligamentous exam is a had difficulty getting him to settle down but there may have been a mild pivot shift.      Radiology: MRI films and report from open sided MRI dated 2/27/2020 were reviewed which shows a complex medial meniscal tear with para meniscal cyst formation as well as joint effusion and knee arthritis especially of the femoral condyles medial more so than laterally and some to the patellofemoral joint.    The ACL does not appear to be well defined with linear low signal intensity structure extending from the anterior to posterior and the joint adherent to the anterior horn of the medial meniscus thought to be portions of ACL retained in the joint.      Assessment/Plan: 1.  Right medial meniscal tear  2.  Right knee tricompartmental chondromalacial changes  3.  Right knee probable ACL tear.    Reviewed treatment options going forward with him today and he said he really just wants to have his \"knee fixed\" what ever it takes to do that\".  Reviewed with him that I am not sure that ACL reconstruction would be of benefit to him given his age and degree of arthritic change but he does seem to describe some mechanical symptoms possibly consistent with instability.    Reviewed with him that I certainly think that knee arthroscopy could be of benefit to him but was not sure whether or not he should have ACL reconstruction and so are going to have him see Dr. Hayes for another opinion regarding whether or not he should have ACL reconstruction in conjunction with his knee arthroscopy.    He will let me know after she sees him and what her recommendations are.  "

## 2020-03-06 ENCOUNTER — OFFICE VISIT (OUTPATIENT)
Dept: ORTHOPEDIC SURGERY | Facility: CLINIC | Age: 55
End: 2020-03-06

## 2020-03-06 VITALS — BODY MASS INDEX: 40.43 KG/M2 | HEIGHT: 69 IN | TEMPERATURE: 97.5 F | WEIGHT: 273 LBS

## 2020-03-06 DIAGNOSIS — M17.11 PRIMARY LOCALIZED OSTEOARTHROSIS OF RIGHT LOWER LEG: ICD-10-CM

## 2020-03-06 DIAGNOSIS — M19.90 ARTHRITIS: ICD-10-CM

## 2020-03-06 DIAGNOSIS — M25.561 RIGHT KNEE PAIN, UNSPECIFIED CHRONICITY: Primary | ICD-10-CM

## 2020-03-06 PROCEDURE — 20610 DRAIN/INJ JOINT/BURSA W/O US: CPT | Performed by: ORTHOPAEDIC SURGERY

## 2020-03-06 PROCEDURE — 73560 X-RAY EXAM OF KNEE 1 OR 2: CPT | Performed by: ORTHOPAEDIC SURGERY

## 2020-03-06 PROCEDURE — 99214 OFFICE O/P EST MOD 30 MIN: CPT | Performed by: ORTHOPAEDIC SURGERY

## 2020-03-06 RX ADMIN — METHYLPREDNISOLONE ACETATE 80 MG: 80 INJECTION, SUSPENSION INTRA-ARTICULAR; INTRALESIONAL; INTRAMUSCULAR; SOFT TISSUE at 09:24

## 2020-03-06 NOTE — PROGRESS NOTES
New Right Knee      Patient: Hal Alexandre        YOB: 1965    Medical Record Number: [id number]        Chief Complaints: right knee pain      History of Present Illness: This is a 54-year-old male who presents complaining of right knee pain he states it hurt him for 25 years but much worse the last 2 months he states he gets stuck where he cannot bend it or straighten it he does have swelling has night pain he is an  and is able to work.  His pain is primarily medial he describes his symptoms as mild to moderate severe depending on the day and the position intermittent stabbing aching burning swelling clicking redness worse with standing driving walking somewhat better with rest past medical history is marked for diabetes and thyroid disease and obesity        Allergies: No Known Allergies    Medications:   Home Medications:  Current Outpatient Medications on File Prior to Visit   Medication Sig   • amLODIPine (NORVASC) 10 MG tablet Take 1 tablet by mouth Daily for 90 days. for blood pressure   • atorvastatin (LIPITOR) 40 MG tablet Take 1 tablet by mouth Daily for 90 days.   • ezetimibe (ZETIA) 10 MG tablet Take 1 tablet by mouth Every Night for 90 days.   • irbesartan (AVAPRO) 300 MG tablet Take 1 tablet by mouth Daily for 90 days.   • levothyroxine (SYNTHROID, LEVOTHROID) 175 MCG tablet Take 1 tablet by mouth Daily for 90 days.   • metFORMIN ER (GLUCOPHAGE-XR) 500 MG 24 hr tablet Take 4 tablets by mouth Daily With Breakfast for 90 days.   • pioglitazone (ACTOS) 30 MG tablet Take 1 tablet by mouth Daily for 90 days. For DMII; needs labs!!!!!!!  30 day Rx; lab order sent and make appt   • vitamin D (ERGOCALCIFEROL) 1.25 MG (75102 UT) capsule capsule Take 1 capsule by mouth Every 7 (Seven) Days for 90 days.     Current Facility-Administered Medications on File Prior to Visit   Medication   • nitroglycerin (NITROSTAT) SL tablet 0.4 mg   • sodium chloride 0.9 % flush 10 mL     Current  "Medications:  Scheduled Meds:   Continuous Infusions:   PRN Meds:.•  nitroglycerin  •  sodium chloride    Past Medical History:   Diagnosis Date   • Controlled type 2 diabetes mellitus without complication, with long-term current use of insulin (CMS/HCC) 2016   • Diabetes mellitus (CMS/HCC)    • Disease of thyroid gland    • Hyperlipidemia    • Hypertension    • Screening for malignant neoplasm of colon 2019    Added automatically from request for surgery [id number]        Past Surgical History:   Procedure Laterality Date   • COLONOSCOPY N/A 2019    Procedure: COLONOSCOPY INTO CECUM AND TI;  Surgeon: Hong Collazo MD;  Location: [facility] ENDOSCOPY;  Service: Gastroenterology   • HEAD/NECK LACERATION REPAIR          Social History     Occupational History   • Not on file   Tobacco Use   • Smoking status: Former Smoker     Packs/day: 1.00     Years: 10.00     Pack years: 10.00     Types: Cigarettes     Last attempt to quit:      Years since quittin.2   • Smokeless tobacco: Never Used   Substance and Sexual Activity   • Alcohol use: Yes     Comment: weekend-significant   • Drug use: No   • Sexual activity: Yes     Partners: Female      Social History     Social History Narrative   • Not on file        Family History   Problem Relation Age of Onset   • Diabetes Mother    • Dementia Mother    • Cancer Father    • Cancer Brother              Review of Systems: 14 point review of systems are remarkable for the pertinent positives listed in the chart by the patient the remainder negative    Review of Systems      Physical Exam: 54 y.o. male  General Appearance:    Alert, cooperative, in no acute distress                   Vitals:    20 0852   Temp: 97.5 °F (36.4 °C)   TempSrc: Temporal   Weight: 124 kg (273 lb)   Height: 175.3 cm (69\")      Patient is alert and read ×3 no acute distress appears her above-listed at height weight and age.  Affect is normal respiratory rate is normal unlabored. " Heart rate regular rate rhythm, sclera, dentition and hearing are normal for the purpose of this exam.        Ortho Exam Physical exam of the right knee reveals no effusion, no erythema.  It mild loss of extension and full flexion  Patient has mild varus alignment.  They have mild tenderness to palpation about the medial compartment, no tenderness laterally..  The patient has a negative bounce home, negative Hui he does have some mild increase in translation with a Lockman questionable endpoint I did not get him to pivot shift.  Quad tone is reasonable and symmetric.  There are no overlying skin changes no lymphedema no lymphadenopathy.  There is good hip range of motion which is full symmetric and asymptomatic and a normal ankle exam.      Large Joint Arthrocentesis: R knee  Date/Time: 3/6/2020 9:24 AM  Consent given by: patient  Site marked: site marked  Timeout: Immediately prior to procedure a time out was called to verify the correct patient, procedure, equipment, support staff and site/side marked as required   Supporting Documentation  Indications: pain   Procedure Details  Location: knee - R knee  Preparation: Patient was prepped and draped in the usual sterile fashion  Needle size: 22 G  Approach: anteromedial  Medications administered: 3 mL lidocaine (cardiac); 80 mg methylPREDNISolone acetate 80 MG/ML  Patient tolerance: patient tolerated the procedure well with no immediate complications                   Radiology:   AP, Lateral and merchant views of the right knee  were /reviewed to evauateknee pain.  These were taken by Dr. Guo he does have some narrowing of his medial compartment as compared to the opposite side.  He also has an MRI which shows some changes in the ACL the fibers are heading in the right direction but it is edematous and demonstrates an injury to this structure.  I am not 100% convinced that it is a complete tear yet he also has degenerative changes and a complex tear of  the medial meniscus    .I did take a 30 degree PA flexion view which shows a significant narrowing near bone-on-bone of that medial compartment      Imaging Results (Most Recent)     Procedure Component Value Units Date/Time    XR Knee 1 or 2 View Right [[id number]] Resulted:  03/06/20 0913     Updated:  03/06/20 0913    Impression:       Ordering physician's impression is located in the Encounter Note dated 03/06/20. X-ray performed in the DR room.          Assessment/Plan:    Right knee pain with the medial meniscus tears questionable ACL injury but significant degenerative changes.  I do not think this gentleman will benefit from arthroscopy I think his arthritis is too bad I will refer him to Dr. Mclaughlin.  I will going to start him into physical therapy and encouraged him to keep his weight down he is right at the cutoff of 40 BMI

## 2020-03-09 RX ORDER — METHYLPREDNISOLONE ACETATE 80 MG/ML
80 INJECTION, SUSPENSION INTRA-ARTICULAR; INTRALESIONAL; INTRAMUSCULAR; SOFT TISSUE
Status: COMPLETED | OUTPATIENT
Start: 2020-03-06 | End: 2020-03-06

## 2020-03-10 ENCOUNTER — TELEPHONE (OUTPATIENT)
Dept: ORTHOPEDIC SURGERY | Facility: CLINIC | Age: 55
End: 2020-03-10

## 2020-03-10 NOTE — TELEPHONE ENCOUNTER
I can see patient at 1245 on Tuesday at [place]   Subjective    Mr. Gomez is 73 y.o. male    CHIEF COMPLAINT: BPH    Patient comes back today for follow-up of BPH clinically he is doing reasonably well he has not take any medications for his prostate he denies any gross hematuria there is no flank pain no worsening of his voiding symptoms etc..    From a PSA point of view he has had an elevated PSA in the past in reviewing his record back in 2017 he did have a PSA of 5.5 but then that came back down to basically normal for his age at 4.2.    This year however unfortunately gone up to 6.1.    He has never had a biopsy in the past he has no family history of prostate cancer and his symptoms certainly have not changed      History of Present Illness      The following portions of the patient's history were reviewed and updated as appropriate: allergies, current medications, past family history, past medical history, past social history, past surgical history and problem list.    Review of Systems   Constitutional: Negative.  Negative for chills and fever.   Gastrointestinal: Negative for abdominal distention, abdominal pain, anal bleeding, blood in stool and nausea.   Genitourinary: Negative for difficulty urinating, dysuria, flank pain, frequency, hematuria and urgency.   Psychiatric/Behavioral: Negative.  Negative for agitation and confusion.         Current Outpatient Medications:   •  amitriptyline (ELAVIL) 25 MG tablet, Take 1 tablet by mouth Every Night., Disp: 90 tablet, Rfl: 1  •  amLODIPine (NORVASC) 5 MG tablet, TAKE 1 TABLET BY MOUTH DAILY, Disp: 90 tablet, Rfl: 0  •  aspirin 81 MG chewable tablet, Take 81 mg by mouth daily., Disp: , Rfl:   •  esomeprazole (nexIUM) 40 MG capsule, Take 1 capsule by mouth Daily., Disp: 90 capsule, Rfl: 1  •  ezetimibe (ZETIA) 10 MG tablet, TAKE 1 TABLET BY MOUTH DAILY, Disp: 90 tablet, Rfl: 0  •  metoprolol tartrate (LOPRESSOR) 50 MG tablet, Take 1 tablet by mouth 2 (Two) Times a Day., Disp: 180 tablet, Rfl: 1  •  sildenafil  "(VIAGRA) 100 MG tablet, Take 100 mg by mouth as needed for Erectile Dysfunction., Disp: , Rfl:     Past Medical History:   Diagnosis Date   • Diverticulosis    • Erectile disorder due to medical condition in male patient    • GERD (gastroesophageal reflux disease)    • Hyperlipidemia    • Hypertension    • Insomnia        Past Surgical History:   Procedure Laterality Date   • CHOLECYSTECTOMY         Social History     Socioeconomic History   • Marital status:      Spouse name: Not on file   • Number of children: Not on file   • Years of education: Not on file   • Highest education level: Not on file   Occupational History   • Occupation: retired   Tobacco Use   • Smoking status: Former Smoker   Substance and Sexual Activity   • Alcohol use: Yes   • Drug use: No       Family History   Problem Relation Age of Onset   • No Known Problems Father    • No Known Problems Mother        Objective    Temp 97.2 °F (36.2 °C)   Ht 170.2 cm (67\")   Wt 86.6 kg (191 lb)   BMI 29.91 kg/m²     Physical Exam   Constitutional: He is oriented to person, place, and time. He appears well-developed and well-nourished.   Pulmonary/Chest: Effort normal.   Abdominal: Soft. He exhibits no distension and no mass. There is no tenderness. There is no rebound and no guarding. No hernia. Hernia confirmed negative in the right inguinal area and confirmed negative in the left inguinal area.   Lap tonia scar   Genitourinary: Testes normal and penis normal. Rectal exam shows no mass, no tenderness and anal tone normal. Enlarged: for the age of the patient. Right testis shows no mass, no swelling and no tenderness. Left testis shows no mass, no swelling and no tenderness. Circumcised. No hypospadias. No discharge found.   Genitourinary Comments:  The urethral meatus normal in position without evidence of stricture. Epididymis without mass or tenderness. Vas Deferens is palpably normal.Anus and perineum without mass or tenderness. The prostate " is approximately 35 ml. It is Symmetric, with a Soft consistency. There are no nodules present. . The seminal vesicles are Not palpable due to the size of the prostate.     Neurological: He is alert and oriented to person, place, and time.   Vitals reviewed.        Results Encounter on 02/12/2019   Component Date Value Ref Range Status   • Glucose 02/12/2019 102* 70 - 100 mg/dL Final   • BUN 02/12/2019 20  5 - 21 mg/dL Final   • Creatinine 02/12/2019 1.08  0.50 - 1.40 mg/dL Final   • eGFR Non  Am 02/12/2019 67  >60 mL/min/1.73 Final    Comment: The MDRD GFR formula is only valid for adults with stable  renal function between ages 18 and 70.     • eGFR  Am 02/12/2019 81  >60 mL/min/1.73 Final   • BUN/Creatinine Ratio 02/12/2019 18.5  7.0 - 25.0 Final   • Sodium 02/12/2019 140  135 - 145 mmol/L Final   • Potassium 02/12/2019 4.4  3.5 - 5.3 mmol/L Final   • Chloride 02/12/2019 103  98 - 110 mmol/L Final   • Total CO2 02/12/2019 29.0  24.0 - 31.0 mmol/L Final   • Calcium 02/12/2019 9.0  8.4 - 10.4 mg/dL Final   • Total Protein 02/12/2019 6.8  6.3 - 8.7 g/dL Final   • Albumin 02/12/2019 4.00  3.50 - 5.00 g/dL Final   • Globulin 02/12/2019 2.8  gm/dL Final   • A/G Ratio 02/12/2019 1.4  1.1 - 2.5 g/dL Final   • Total Bilirubin 02/12/2019 0.5  0.1 - 1.0 mg/dL Final   • Alkaline Phosphatase 02/12/2019 82  24 - 120 U/L Final   • AST (SGOT) 02/12/2019 25  7 - 45 U/L Final   • ALT (SGPT) 02/12/2019 26  0 - 54 U/L Final   • WBC 02/12/2019 5.08  4.80 - 10.80 10*3/mm3 Final   • RBC 02/12/2019 5.20  4.80 - 5.90 10*6/mm3 Final   • Hemoglobin 02/12/2019 15.3  14.0 - 18.0 g/dL Final   • Hematocrit 02/12/2019 44.7  40.0 - 52.0 % Final   • MCV 02/12/2019 86.0  82.0 - 95.0 fL Final   • MCH 02/12/2019 29.4  28.0 - 32.0 pg Final   • MCHC 02/12/2019 34.2  33.0 - 36.0 g/dL Final   • RDW 02/12/2019 12.3  12.0 - 15.0 % Final   • Platelets 02/12/2019 208  130 - 400 10*3/mm3 Final   • Neutrophil Rel % 02/12/2019 60.7  39.0 - 78.0 %  Final   • Lymphocyte Rel % 02/12/2019 24.0  15.0 - 45.0 % Final   • Monocyte Rel % 02/12/2019 10.2  4.0 - 12.0 % Final   • Eosinophil Rel % 02/12/2019 3.3  0.0 - 4.0 % Final   • Basophil Rel % 02/12/2019 1.4  0.0 - 2.0 % Final   • Neutrophils Absolute 02/12/2019 3.08  1.87 - 8.40 10*3/mm3 Final   • Lymphocytes Absolute 02/12/2019 1.22  0.72 - 4.86 10*3/mm3 Final   • Monocytes Absolute 02/12/2019 0.52  0.19 - 1.30 10*3/mm3 Final   • Eosinophils Absolute 02/12/2019 0.17  0.00 - 0.70 10*3/mm3 Final   • Basophils Absolute 02/12/2019 0.07  0.00 - 0.20 10*3/mm3 Final   • Immature Granulocyte Rel % 02/12/2019 0.4  0.0 - 5.0 % Final   • Immature Grans Absolute 02/12/2019 0.02  0.00 - 0.05 10*3/mm3 Final   • nRBC 02/12/2019 0.0  0.0 - 0.0 /100 WBC Final   • Total Cholesterol 02/12/2019 174  130 - 200 mg/dL Final   • Triglycerides 02/12/2019 121  0 - 149 mg/dL Final   • HDL Cholesterol 02/12/2019 40  >=40 mg/dL Final   • VLDL Cholesterol 02/12/2019 24.2  mg/dL Final   • LDL Cholesterol  02/12/2019 110* 0 - 99 mg/dL Final   • Chol/HDL Ratio 02/12/2019 4.35   Final   • TSH 02/12/2019 1.380  0.470 - 4.680 mIU/mL Final       Results for orders placed or performed in visit on 02/26/19   POC Urinalysis Dipstick, Multipro   Result Value Ref Range    Color Yellow Yellow, Straw, Dark Yellow, Thu    Clarity, UA Clear Clear    Glucose, UA Negative Negative, 1000 mg/dL (3+) mg/dL    Bilirubin Negative Negative    Ketones, UA Negative Negative    Specific Gravity  1.010 1.005 - 1.030    Blood, UA Negative Negative    pH, Urine 6.5 5.0 - 8.0    Protein, POC Negative Negative mg/dL    Urobilinogen, UA Normal Normal    Nitrite, UA Negative Negative    Leukocytes Negative Negative     Patient's Body mass index is 29.91 kg/m². BMI is above normal parameters. Recommendations include: educational material.  Assessment and Plan    Diagnoses and all orders for this visit:    Elevated prostate specific antigen (PSA)  -     POC Urinalysis  Dipstick, Multipro  -     PSA DIAGNOSTIC; Future    Benign prostatic hyperplasia with lower urinary tract symptoms, symptom details unspecified    Plan--I discussed the increasing PSA with the patient and the possible risk of prostate cancer I discussed with him at length the options which in my mind would be going ahead with an ultrasound and biopsy at this point I discussed the procedure with him risk complications how we do the procedure discomfort pre-and post potential risk of infection bleeding etc.    We also discussed possibility of just observation and repeat the PSA again in 3 months and see if we will come back down to normal although obviously if it does not then we will have to do the biopsy at that point with the risk that he had prostate cancer for 3 months which is  is usually a relatively minimal risk.    After complete discussion of all the options he would like to wait to we will repeat the PSA again in 3 months time.    As far as his BPH symptoms these are stable he does not require medication if they should worsen he will let us know

## 2020-03-10 NOTE — TELEPHONE ENCOUNTER
PATIENT'S WIFE CALLED TO SAY HE IS IN SEVERE PAIN, CAN HARDLY WALK. HAS APPT FOR R KNEE WITH [facility] ON 4-14. WANTS HIM WORKED IN SOONER, PLEASE ADVISE

## 2020-03-12 ENCOUNTER — TREATMENT (OUTPATIENT)
Dept: PHYSICAL THERAPY | Facility: CLINIC | Age: 55
End: 2020-03-12

## 2020-03-12 DIAGNOSIS — M17.11 PRIMARY LOCALIZED OSTEOARTHROSIS OF RIGHT LOWER LEG: ICD-10-CM

## 2020-03-12 DIAGNOSIS — G89.29 CHRONIC PAIN OF RIGHT KNEE: Primary | ICD-10-CM

## 2020-03-12 DIAGNOSIS — R26.2 DIFFICULTY WALKING: ICD-10-CM

## 2020-03-12 DIAGNOSIS — M25.561 CHRONIC PAIN OF RIGHT KNEE: Primary | ICD-10-CM

## 2020-03-12 PROCEDURE — 97161 PT EVAL LOW COMPLEX 20 MIN: CPT | Performed by: PHYSICAL THERAPIST

## 2020-03-12 PROCEDURE — 97110 THERAPEUTIC EXERCISES: CPT | Performed by: PHYSICAL THERAPIST

## 2020-03-12 NOTE — PROGRESS NOTES
"Physical Therapy Initial Evaluation and Plan of Care    Patient: Hal Alexandre   : 1965  Diagnosis/ICD-10 Code:  Chronic pain of right knee [M25.561, G89.29]  Referring practitioner: Gill Hayes MD    Subjective Evaluation    History of Present Illness  Onset date: chronic.  Mechanism of injury: Patient reports (R) knee has torn ACL, torn medial meniscus, and severe arthritis.  Patient reports he will be seeing Dr. Mclaughlin on Tuesday of next week to schedule (R) TKA but was told he has to do physical therapy \"for insurance purposes.\"    Having significant pain with walking (especially up or down an incline), steps, getting up and down from chairs. Has catching pain and locking with difficulty bending and straightening the knee.  This may happen a couple of times per day.    Some days it is fine for most of the day and then a certain movement will cause it to \"go out.\"           Patient Occupation:  of life: good    Pain  Current pain ratin  At best pain ratin  At worst pain rating: 10  Location: (R) medial knee  Quality: dull ache  Relieving factors: heat and medications  Aggravating factors: ambulation, stairs and standing    Diagnostic Tests  MRI studies: abnormal (torn ACL, torn meniscus)    Treatments  Current treatment: physical therapy  Patient Goals  Patient goal: \"I'm only here because I have to for insurance purposes to get my surgery\"           Subjective Questionnaire: LEFS: 34/80    Objective       Tenderness     Additional Tenderness Details  Mod (+) TTP (L) medial tibial plateau    Active Range of Motion   Left Knee   Flexion: 113 degrees     Right Knee   Flexion: 120 degrees   Extension: 2 degrees with pain  Extensor la degrees with pain    Additional Active Range of Motion Details  (L) knee 7 degrees hyperextension    Strength/Myotome Testing     Left Knee   Flexion: 5  Extension: 5  Quadriceps contraction: good    Right Knee   Flexion: 4+  Extension: 4+ " (within available ROM)  Quadriceps contraction: fair    Swelling     Left Knee Girth Measurement (cm)   Joint line: 41.4.    Right Knee Girth Measurement (cm)   Joint line: 42.5.    Ambulation     Observational Gait   Gait: antalgic and asymmetric   Decreased walking speed, right stance time and left step length.          Assessment & Plan     Assessment  Impairments: abnormal coordination, abnormal gait, abnormal muscle firing, abnormal or restricted ROM, activity intolerance, impaired physical strength, lacks appropriate home exercise program and pain with function  Assessment details: Patient is a 54 y.o male with (R) knee end stage OA, ACL tear, and meniscus tear.  He is planning to pursue TKA as soon as possible.  He rates symptoms 4-10/10, worst with walking, standing, and negotiating stairs. He exhibits antalgic and compensated gait pattern, tenderness, decreased and painful (R) knee AROM, decreased (R) LE strength, and mild swelling.  His LEFS score is 34/80 indicating a significant perceived level of physical disability.  He will benefit from a short course of skilled PT to establish pre-hab HEP to facilitate most successful outcome of pending TKA.  Prognosis: good  Prognosis details: Good for goals  Functional Limitations: walking, uncomfortable because of pain, standing and stooping  Goals  Plan Goals: STGs: to be met in 1 week  1. Patient will be independent with initial HEP    LTGs: to be met in 2 weeks  2. Patient will be independent with progressed prehab HEP      Plan  Therapy options: will be seen for skilled physical therapy services  Planned modality interventions: cryotherapy and electrical stimulation/Russian stimulation  Planned therapy interventions: ADL retraining, balance/weight-bearing training, fine motor coordination training, flexibility, functional ROM exercises, gait training, home exercise program, joint mobilization, manual therapy, motor coordination training, neuromuscular  re-education, soft tissue mobilization, strengthening, stretching and therapeutic activities  Duration in visits: 3  Treatment plan discussed with: patient        Manual Therapy:         mins  13869;  Therapeutic Exercise:    26     mins  25754;     Neuromuscular Kirsty:        mins  38561;    Therapeutic Activity:          mins  47043;     Gait Training:           mins  74944;     Ultrasound:          mins  27335;    Electrical Stimulation:         mins  14620 ( );  Dry Needling          mins self-pay    Timed Treatment:   26   mins   Total Treatment:     41   mins    PT SIGNATURE: Pat Stevenson PT DPT   DATE TREATMENT INITIATED: 3/12/2020    Initial Certification  Certification Period: 6/10/2020  I certify that the therapy services are furnished while this patient is under my care.  The services outlined above are required by this patient, and will be reviewed every 90 days.     PHYSICIAN: Gill Hayes MD      DATE:     Please sign and return via fax to [phone]. Thank you, [facility].

## 2020-03-17 ENCOUNTER — CONSULT (OUTPATIENT)
Dept: ORTHOPEDIC SURGERY | Facility: CLINIC | Age: 55
End: 2020-03-17

## 2020-03-17 VITALS — WEIGHT: 269 LBS | HEIGHT: 69 IN | TEMPERATURE: 97.4 F | BODY MASS INDEX: 39.84 KG/M2

## 2020-03-17 DIAGNOSIS — M17.11 PRIMARY OSTEOARTHRITIS OF RIGHT KNEE: Primary | ICD-10-CM

## 2020-03-17 PROCEDURE — 99214 OFFICE O/P EST MOD 30 MIN: CPT | Performed by: ORTHOPAEDIC SURGERY

## 2020-03-17 RX ORDER — MELOXICAM 15 MG/1
15 TABLET ORAL ONCE
Status: CANCELLED | OUTPATIENT
Start: 2020-05-08 | End: 2020-03-17

## 2020-03-17 RX ORDER — CEFAZOLIN SODIUM 2 G/100ML
2 INJECTION, SOLUTION INTRAVENOUS ONCE
Status: CANCELLED | OUTPATIENT
Start: 2020-05-08 | End: 2020-03-17

## 2020-03-17 RX ORDER — PREGABALIN 75 MG/1
150 CAPSULE ORAL ONCE
Status: CANCELLED | OUTPATIENT
Start: 2020-05-08 | End: 2020-03-17
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
## 2020-03-29 ENCOUNTER — RESULTS ENCOUNTER (OUTPATIENT)
Dept: FAMILY MEDICINE CLINIC | Facility: CLINIC | Age: 55
End: 2020-03-29

## 2020-03-29 DIAGNOSIS — E03.9 ACQUIRED HYPOTHYROIDISM: ICD-10-CM

## 2020-03-29 DIAGNOSIS — E55.9 VITAMIN D DEFICIENCY: ICD-10-CM

## 2020-03-29 DIAGNOSIS — I10 ESSENTIAL HYPERTENSION: ICD-10-CM

## 2020-03-29 DIAGNOSIS — E11.65 TYPE 2 DIABETES MELLITUS WITH HYPERGLYCEMIA, WITHOUT LONG-TERM CURRENT USE OF INSULIN (HCC): ICD-10-CM

## 2020-03-29 DIAGNOSIS — E78.49 OTHER HYPERLIPIDEMIA: ICD-10-CM

## 2020-04-29 ENCOUNTER — TELEMEDICINE (OUTPATIENT)
Dept: FAMILY MEDICINE CLINIC | Facility: CLINIC | Age: 55
End: 2020-04-29

## 2020-04-29 DIAGNOSIS — E78.49 OTHER HYPERLIPIDEMIA: ICD-10-CM

## 2020-04-29 DIAGNOSIS — E55.9 VITAMIN D DEFICIENCY: ICD-10-CM

## 2020-04-29 DIAGNOSIS — E03.9 ACQUIRED HYPOTHYROIDISM: ICD-10-CM

## 2020-04-29 DIAGNOSIS — I10 ESSENTIAL HYPERTENSION: ICD-10-CM

## 2020-04-29 DIAGNOSIS — E11.65 TYPE 2 DIABETES MELLITUS WITH HYPERGLYCEMIA, WITHOUT LONG-TERM CURRENT USE OF INSULIN (HCC): ICD-10-CM

## 2020-04-29 PROCEDURE — 99442 PR PHYS/QHP TELEPHONE EVALUATION 11-20 MIN: CPT | Performed by: FAMILY MEDICINE

## 2020-04-29 RX ORDER — ATORVASTATIN CALCIUM 40 MG/1
40 TABLET, FILM COATED ORAL DAILY
Qty: 90 TABLET | Refills: 0 | Status: SHIPPED | OUTPATIENT
Start: 2020-04-29 | End: 2020-09-09 | Stop reason: SDUPTHER

## 2020-04-29 RX ORDER — PIOGLITAZONEHYDROCHLORIDE 30 MG/1
30 TABLET ORAL DAILY
Qty: 90 TABLET | Refills: 0 | Status: SHIPPED | OUTPATIENT
Start: 2020-04-29 | End: 2020-07-27

## 2020-04-29 RX ORDER — ERGOCALCIFEROL 1.25 MG/1
50000 CAPSULE ORAL
Qty: 12 CAPSULE | Refills: 0 | Status: SHIPPED | OUTPATIENT
Start: 2020-04-29 | End: 2020-08-27

## 2020-04-29 RX ORDER — LEVOTHYROXINE SODIUM 175 UG/1
175 TABLET ORAL DAILY
Qty: 90 TABLET | Refills: 0 | Status: SHIPPED | OUTPATIENT
Start: 2020-04-29 | End: 2020-07-27

## 2020-04-29 RX ORDER — EZETIMIBE 10 MG/1
10 TABLET ORAL NIGHTLY
Qty: 90 TABLET | Refills: 0 | Status: SHIPPED | OUTPATIENT
Start: 2020-04-29 | End: 2020-07-27

## 2020-04-29 RX ORDER — IRBESARTAN 300 MG/1
300 TABLET ORAL DAILY
Qty: 90 TABLET | Refills: 0 | Status: SHIPPED | OUTPATIENT
Start: 2020-04-29 | End: 2020-07-27

## 2020-04-29 RX ORDER — AMLODIPINE BESYLATE 10 MG/1
10 TABLET ORAL DAILY
Qty: 90 TABLET | Refills: 0 | Status: SHIPPED | OUTPATIENT
Start: 2020-04-29 | End: 2020-07-27

## 2020-04-29 RX ORDER — METFORMIN HYDROCHLORIDE 500 MG/1
2000 TABLET, EXTENDED RELEASE ORAL
Qty: 360 TABLET | Refills: 0 | Status: SHIPPED | OUTPATIENT
Start: 2020-04-29 | End: 2020-07-27

## 2020-04-29 NOTE — PROGRESS NOTES
Mode of Visit: Telephone  You have chosen to receive care through a telephone visit today. Do you consent to use a telephone visit for your medical care today? Yes   Hal Alexandre confirmed that he was in [place] at the time of this telephonic visit.   The visit included telephone interaction. No technical issues occurred during this visit.   more than 10 minutes up to 20 minutes.   Subjective       Chief Complaint   Patient presents with   • Hypertension   • Hyperlipidemia   • Diabetes   • Hypothyroidism         HPI:       Hal Alexandre is a 54 y.o. male who presents to  [address] today to refill medicines.  Overall he feels well.  Blood pressure stable.  Lipids stable pending lab results.  Diabetes stable pending lab results.  Thyroid stable pending lab results.  No labs done due to Covid pandemic.    I have reviewed and updated his medications, medical history and problem list during today's office visit.     Social History     Tobacco Use   • Smoking status: Former Smoker     Packs/day: 1.00     Years: 10.00     Pack years: 10.00     Types: Cigarettes     Last attempt to quit:      Years since quittin.3   • Smokeless tobacco: Never Used   Substance Use Topics   • Alcohol use: Yes     Alcohol/week: 25.0 standard drinks     Types: 25 Cans of beer per week       Review of Systems      PE:   Objective   There were no vitals taken for this visit.    There is no height or weight on file to calculate BMI.    Physical Exam     Data Reviewed:                      A/P:     Assessment/Plan   Diagnoses and all orders for this visit:    1. Essential hypertension  Assessment & Plan:  Hypertension is unchanged.  Continue current treatment regimen.  Blood pressure will be reassessed at the next regular appointment.    Orders:  -     amLODIPine (NORVASC) 10 MG tablet; Take 1 tablet by mouth Daily for 90 days. for blood pressure  Dispense: 90 tablet;  Refill: 0  -     irbesartan (AVAPRO) 300 MG tablet; Take 1 tablet by mouth Daily for 90 days.  Dispense: 90 tablet; Refill: 0    2. Other hyperlipidemia  Assessment & Plan:  Lipid abnormalities are unchanged.  Pharmacotherapy as ordered.  Lipids will be reassessed in 3 months.    Orders:  -     atorvastatin (LIPITOR) 40 MG tablet; Take 1 tablet by mouth Daily for 90 days.  Dispense: 90 tablet; Refill: 0  -     ezetimibe (ZETIA) 10 MG tablet; Take 1 tablet by mouth Every Night for 90 days.  Dispense: 90 tablet; Refill: 0    3. Acquired hypothyroidism  Assessment & Plan:  Continue same therapy.    Orders:  -     levothyroxine (SYNTHROID, LEVOTHROID) 175 MCG tablet; Take 1 tablet by mouth Daily for 90 days.  Dispense: 90 tablet; Refill: 0    4. Type 2 diabetes mellitus with hyperglycemia, without long-term current use of insulin (CMS/[facility])  Assessment & Plan:  Diabetes is unchanged.   Continue current treatment regimen.  Diabetes will be reassessed in 3 months.    Orders:  -     metFORMIN ER (GLUCOPHAGE-XR) 500 MG 24 hr tablet; Take 4 tablets by mouth Daily With Breakfast for 90 days.  Dispense: 360 tablet; Refill: 0  -     pioglitazone (ACTOS) 30 MG tablet; Take 1 tablet by mouth Daily for 90 days. For DMII; needs labs!!!!!!!  30 day Rx; lab order sent and make appt  Dispense: 90 tablet; Refill: 0    5. Vitamin D deficiency  -     vitamin D (ERGOCALCIFEROL) 1.25 MG (69266 UT) capsule capsule; Take 1 capsule by mouth Every 7 (Seven) Days for 90 days.  Dispense: 12 capsule; Refill: 0  Plan labs prior to next visit.  These have been ordered previously and were not done at this visit due to the current pandemic.      Follow up:    Return in about 3 months (around 7/29/2020) for Recheck/Medication  Refill, Adult Wellness Visit, 30 minute visit.

## 2020-05-05 ENCOUNTER — APPOINTMENT (OUTPATIENT)
Dept: PREADMISSION TESTING | Facility: HOSPITAL | Age: 55
End: 2020-05-05

## 2020-05-05 VITALS
SYSTOLIC BLOOD PRESSURE: 144 MMHG | DIASTOLIC BLOOD PRESSURE: 85 MMHG | BODY MASS INDEX: 40.5 KG/M2 | OXYGEN SATURATION: 98 % | RESPIRATION RATE: 18 BRPM | TEMPERATURE: 97.6 F | HEIGHT: 69 IN | WEIGHT: 273.44 LBS | HEART RATE: 83 BPM

## 2020-05-05 DIAGNOSIS — M17.11 PRIMARY OSTEOARTHRITIS OF RIGHT KNEE: ICD-10-CM

## 2020-05-05 LAB
ANION GAP SERPL CALCULATED.3IONS-SCNC: 11.1 MMOL/L (ref 5–15)
BILIRUB UR QL STRIP: NEGATIVE
BUN BLD-MCNC: 14 MG/DL (ref 6–20)
BUN/CREAT SERPL: 15.9 (ref 7–25)
CALCIUM SPEC-SCNC: 8.9 MG/DL (ref 8.6–10.5)
CHLORIDE SERPL-SCNC: 104 MMOL/L (ref 98–107)
CLARITY UR: CLEAR
CO2 SERPL-SCNC: 23.9 MMOL/L (ref 22–29)
COLOR UR: YELLOW
CREAT BLD-MCNC: 0.88 MG/DL (ref 0.76–1.27)
DEPRECATED RDW RBC AUTO: 40.7 FL (ref 37–54)
ERYTHROCYTE [DISTWIDTH] IN BLOOD BY AUTOMATED COUNT: 13.4 % (ref 12.3–15.4)
GFR SERPL CREATININE-BSD FRML MDRD: 91 ML/MIN/1.73
GLUCOSE BLD-MCNC: 185 MG/DL (ref 65–99)
GLUCOSE UR STRIP-MCNC: ABNORMAL MG/DL
HCT VFR BLD AUTO: 41.7 % (ref 37.5–51)
HGB BLD-MCNC: 14.1 G/DL (ref 13–17.7)
HGB UR QL STRIP.AUTO: NEGATIVE
KETONES UR QL STRIP: NEGATIVE
LEUKOCYTE ESTERASE UR QL STRIP.AUTO: NEGATIVE
MCH RBC QN AUTO: 28.1 PG (ref 26.6–33)
MCHC RBC AUTO-ENTMCNC: 33.8 G/DL (ref 31.5–35.7)
MCV RBC AUTO: 83.2 FL (ref 79–97)
NITRITE UR QL STRIP: NEGATIVE
PH UR STRIP.AUTO: 6 [PH] (ref 5–8)
PLATELET # BLD AUTO: 344 10*3/MM3 (ref 140–450)
PMV BLD AUTO: 9.7 FL (ref 6–12)
POTASSIUM BLD-SCNC: 4.4 MMOL/L (ref 3.5–5.2)
PROT UR QL STRIP: NEGATIVE
RBC # BLD AUTO: 5.01 10*6/MM3 (ref 4.14–5.8)
SODIUM BLD-SCNC: 139 MMOL/L (ref 136–145)
SP GR UR STRIP: 1.03 (ref 1–1.03)
UROBILINOGEN UR QL STRIP: ABNORMAL
WBC NRBC COR # BLD: 6.58 10*3/MM3 (ref 3.4–10.8)

## 2020-05-05 PROCEDURE — 81003 URINALYSIS AUTO W/O SCOPE: CPT | Performed by: ORTHOPAEDIC SURGERY

## 2020-05-05 PROCEDURE — 80048 BASIC METABOLIC PNL TOTAL CA: CPT | Performed by: ORTHOPAEDIC SURGERY

## 2020-05-05 PROCEDURE — U0002 COVID-19 LAB TEST NON-CDC: HCPCS | Performed by: NURSE PRACTITIONER

## 2020-05-05 PROCEDURE — 85027 COMPLETE CBC AUTOMATED: CPT | Performed by: ORTHOPAEDIC SURGERY

## 2020-05-05 PROCEDURE — 93010 ELECTROCARDIOGRAM REPORT: CPT | Performed by: INTERNAL MEDICINE

## 2020-05-05 PROCEDURE — 93005 ELECTROCARDIOGRAM TRACING: CPT

## 2020-05-05 PROCEDURE — 36415 COLL VENOUS BLD VENIPUNCTURE: CPT

## 2020-05-05 RX ORDER — CHLORHEXIDINE GLUCONATE 500 MG/1
CLOTH TOPICAL
COMMUNITY
End: 2020-05-08 | Stop reason: HOSPADM

## 2020-05-05 ASSESSMENT — KOOS JR
KOOS JR SCORE: 47.487
KOOS JR SCORE: 16

## 2020-05-05 NOTE — DISCHARGE INSTRUCTIONS
ARRIVE DAY OF SURGERY AT 6:30 AM TO [facility]        Take the following medications the morning of surgery:    AMLODIPINE & SYNTHROID    General Instructions:  • Do not eat solid food after midnight the night before surgery.  • You may drink clear liquids day of surgery but must stop at least one hour before your hospital arrival time.  • It is beneficial for you to have a clear drink that contains carbohydrates the day of surgery.  We suggest a 12 to 20 ounce bottle of Gatorade or Powerade for non-diabetic patients or a 12 to 20 ounce bottle of G2 or Powerade Zero for diabetic patients. (Pediatric patients, are not advised to drink a 12 to 20 ounce carbohydrate drink)    Clear liquids are liquids you can see through.  Nothing red in color.     Plain water                               Sports drinks  Sodas                                   Gelatin (Jell-O)  Fruit juices without pulp such as white grape juice and apple juice  Popsicles that contain no fruit or yogurt  Tea or coffee (no cream or milk added)  Gatorade / Powerade  G2 / Powerade Zero    • Infants may have breast milk up to four hours before surgery.  • Infants drinking formula may drink formula up to six hours before surgery.   • Patients who avoid smoking, chewing tobacco and alcohol for 4 weeks prior to surgery have a reduced risk of post-operative complications.  Quit smoking as many days before surgery as you can.  • Do not smoke, use chewing tobacco or drink alcohol the day of surgery.   • If applicable bring your C-PAP/ BI-PAP machine.  • Bring any papers given to you in the doctor’s office.  • Wear clean comfortable clothes.  • Do not wear contact lenses, false eyelashes or make-up.  Bring a case for your glasses.   • Bring crutches or walker if applicable.  • Remove all piercings.  Leave jewelry and any other valuables at home.  • Hair extensions with metal clips must be removed prior to surgery.  • The Pre-Admission Testing nurse will  instruct you to bring medications if unable to obtain an accurate list in Pre-Admission Testing.            Preventing a Surgical Site Infection:  • For 2 to 3 days before surgery, avoid shaving with a razor because the razor can irritate skin and make it easier to develop an infection.    • Any areas of open skin can increase the risk of a post-operative wound infection by allowing bacteria to enter and travel throughout the body.  Notify your surgeon if you have any skin wounds / rashes even if it is not near the expected surgical site.  The area will need assessed to determine if surgery should be delayed until it is healed.  • The night prior to surgery shower using a fresh bar of anti-bacterial soap (such as Dial) and clean washcloth.  Sleep in a clean bed with clean clothing.  Do not allow pets to sleep with you.  • Shower on the morning of surgery using a fresh bar of anti-bacterial soap (such as Dial) and clean washcloth.  Dry with a clean towel and dress in clean clothing.  • Ask your surgeon if you will be receiving antibiotics prior to surgery.  • Make sure you, your family, and all healthcare providers clean their hands with soap and water or an alcohol based hand  before caring for you or your wound.    Day of surgery:  Your arrival time is approximately two hours before your scheduled surgery time.  Upon arrival, a Pre-op nurse and Anesthesiologist will review your health history, obtain vital signs, and answer questions you may have.  The only belongings needed at this time will be a list of your home medications and if applicable your C-PAP/BI-PAP machine.  If you are staying overnight your family can leave the rest of your belongings in the car and bring them to your room later.  A Pre-op nurse will start an IV and you may receive medication in preparation for surgery, including something to help you relax.  Your family will be able to see you in the Pre-op area.  Two visitors at a time  will be allowed in the Pre-op room.  While you are in surgery your family should notify the waiting room  if they leave the waiting room area and provide a contact phone number.    Please be aware that surgery does come with discomfort.  We want to make every effort to control your discomfort so please discuss any uncontrolled symptoms with your nurse.   Your doctor will most likely have prescribed pain medications.      If you are going home after surgery you will receive individualized written care instructions before being discharged.  A responsible adult must drive you to and from the hospital on the day of your surgery and stay with you for 24 hours.    If you are staying overnight following surgery, you will be transported to your hospital room following the recovery period.  [facility] [place] [facility] has all private rooms.    If you have any questions please call Pre-Admission Testing at [phone].  Deductibles and co-payments are collected on the day of service. Please be prepared to pay the required co-pay, deductible or deposit on the day of service as defined by your plan.    Self-Quarantine Prior to Surgery    • Stay at home. Do not leave your home after you have been given the Covid test for your upcoming surgery.   • Wash your hands often with soap and water for at least 20 seconds especially after you have been in a public place, or after blowing your nose, coughing, or sneezing.  • If soap and water are not readily available, use a hand  that contains at least 60% alcohol. Cover all surfaces of your hands and rub them together until they feel dry.  • Avoid touching your eyes, nose, and mouth with unwashed hands.  • Take everyday precautions to keep space between yourself and others (stay 6 feet away, which is about two arm lengths).  Remember that some people without symptoms may be able to spread virus.  • You should stay in a specific room away from others if possible.   Stay at least 6 feet away from others in the home if you cannot have a dedicated room to yourself. Do not have visitors.   • Wear a mask around others in your home if you are unable to stay in a separate room or 6 feet apart. If you are unable to wear a mask, have your family member wear a mask if they must be within 6 feet of you.   • Do not snuggle with your pet. While the CDC says there is no evidence that pets can spread COVID-19 or be infected from humans, it is probably best to avoid “petting, snuggling, being kissed or licked and sharing food (during self-quarantine)”, according to the CDC.   • Do not share anything - Have your own utensils, drinking glass, dishes, towels and bedding.   • Do not share a bathroom with others, if possible.   • Clean and disinfect frequently touched services daily. This includes tables, doorknobs, light switches, countertops, handles, desks, phones, keyboards, toilets, faucets, and sinks.  • Do not travel prior to surgery.    Call your surgeon immediately if you experience any of the following symptoms:  • Sore Throat      • Shortness of Breath or difficulty breathing  • Cough  • Chills  • Body soreness or muscle pain  • Headache  • Fever  • New loss of taste or smell  • Do not arrive for your surgery ill.  Your procedure will need to be rescheduled to another time.  You will need to call your physician before the day of surgery to avoid any unnecessary exposure to hospital staff as well as other patients.    CHLORHEXIDINE CLOTH INSTRUCTIONS  The morning of surgery follow these instructions using the Chlorhexidine cloths you've been given.  These steps reduce bacteria on the body.  Do not use the cloths near your eyes, ears mouth, genitalia or on open wounds.  Throw the cloths away after use but do not try to flush them down a toilet.      • Open and remove one cloth at a time from the package.    • Leave the cloth unfolded and begin the bathing.  • Massage the skin with the  cloths using gentle pressure to remove bacteria.  Do not scrub harshly.   • Follow the steps below with one 2% CHG cloth per area (6 total cloths).  • One cloth for neck, shoulders and chest.  • One cloth for both arms, hands, fingers and underarms (do underarms last).  • One cloth for the abdomen followed by groin.  • One cloth for right leg and foot including between the toes.  • One cloth for left leg and foot including between the toes.  • The last cloth is to be used for the back of the neck, back and buttocks.    Allow the CHG to air dry 3 minutes on the skin which will give it time to work and decrease the chance of irritation.  The skin may feel sticky until it is dry.  Do not rinse with water or any other liquid or you will lose the beneficial effects of the CHG.  If mild skin irritation occurs, do rinse the skin to remove the CHG.  Report this to the nurse at time of admission.  Do not apply lotions, creams, ointments, deodorants or perfumes after using the clothes. Dress in clean clothes before coming to the hospital.    BACTROBAN NASAL OINTMENT  There are many germs normally in your nose. Bactroban is an ointment that will help reduce these germs. Please follow these instructions for Bactroban use:      ____The day before surgery in the morning  Date________    ____The day before surgery in the evening              Date________    ____The day of surgery in the morning    Date________    **Squirt ½ package of Bactroban Ointment onto a cotton applicator and apply to inside of 1st nostril.  Squirt the remaining Bactroban and apply to the inside of the other nostril.

## 2020-05-07 ENCOUNTER — OFFICE VISIT (OUTPATIENT)
Dept: ORTHOPEDIC SURGERY | Facility: CLINIC | Age: 55
End: 2020-05-07

## 2020-05-07 DIAGNOSIS — M25.561 RIGHT KNEE PAIN, UNSPECIFIED CHRONICITY: Primary | ICD-10-CM

## 2020-05-07 LAB
REF LAB TEST METHOD: NORMAL
SARS-COV-2 RNA RESP QL NAA+PROBE: NOT DETECTED

## 2020-05-07 PROCEDURE — S0260 H&P FOR SURGERY: HCPCS | Performed by: NURSE PRACTITIONER
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
## 2020-05-08 ENCOUNTER — APPOINTMENT (OUTPATIENT)
Dept: GENERAL RADIOLOGY | Facility: HOSPITAL | Age: 55
End: 2020-05-08

## 2020-05-08 ENCOUNTER — HOSPITAL ENCOUNTER (INPATIENT)
Facility: HOSPITAL | Age: 55
LOS: 1 days | Discharge: HOME HEALTH | End: 2020-05-08
Attending: ORTHOPAEDIC SURGERY | Admitting: ORTHOPAEDIC SURGERY

## 2020-05-08 ENCOUNTER — ANESTHESIA EVENT (OUTPATIENT)
Dept: PERIOP | Facility: HOSPITAL | Age: 55
End: 2020-05-08

## 2020-05-08 ENCOUNTER — ANESTHESIA (OUTPATIENT)
Dept: PERIOP | Facility: HOSPITAL | Age: 55
End: 2020-05-08

## 2020-05-08 ENCOUNTER — READMISSION MANAGEMENT (OUTPATIENT)
Dept: CALL CENTER | Facility: HOSPITAL | Age: 55
End: 2020-05-08

## 2020-05-08 VITALS
BODY MASS INDEX: 40.37 KG/M2 | HEART RATE: 76 BPM | RESPIRATION RATE: 18 BRPM | DIASTOLIC BLOOD PRESSURE: 79 MMHG | OXYGEN SATURATION: 98 % | WEIGHT: 273.37 LBS | SYSTOLIC BLOOD PRESSURE: 122 MMHG | TEMPERATURE: 97.4 F

## 2020-05-08 DIAGNOSIS — M17.11 PRIMARY OSTEOARTHRITIS OF RIGHT KNEE: ICD-10-CM

## 2020-05-08 LAB
GLUCOSE BLDC GLUCOMTR-MCNC: 136 MG/DL (ref 70–130)
GLUCOSE BLDC GLUCOMTR-MCNC: 140 MG/DL (ref 70–130)

## 2020-05-08 PROCEDURE — 73560 X-RAY EXAM OF KNEE 1 OR 2: CPT

## 2020-05-08 PROCEDURE — C1713 ANCHOR/SCREW BN/BN,TIS/BN: HCPCS | Performed by: ORTHOPAEDIC SURGERY

## 2020-05-08 PROCEDURE — 25010000003 BUPIVACAINE LIPOSOME 1.3 % SUSPENSION 20 ML VIAL: Performed by: ORTHOPAEDIC SURGERY

## 2020-05-08 PROCEDURE — 25010000002 PROPOFOL 10 MG/ML EMULSION: Performed by: ANESTHESIOLOGY

## 2020-05-08 PROCEDURE — 0SRC0J9 REPLACEMENT OF RIGHT KNEE JOINT WITH SYNTHETIC SUBSTITUTE, CEMENTED, OPEN APPROACH: ICD-10-PCS | Performed by: ORTHOPAEDIC SURGERY

## 2020-05-08 PROCEDURE — 25010000002 MIDAZOLAM PER 1 MG: Performed by: ANESTHESIOLOGY

## 2020-05-08 PROCEDURE — 25010000002 PHENYLEPHRINE PER 1 ML: Performed by: ANESTHESIOLOGY

## 2020-05-08 PROCEDURE — 97162 PT EVAL MOD COMPLEX 30 MIN: CPT

## 2020-05-08 PROCEDURE — C1776 JOINT DEVICE (IMPLANTABLE): HCPCS | Performed by: ORTHOPAEDIC SURGERY

## 2020-05-08 PROCEDURE — C9290 INJ, BUPIVACAINE LIPOSOME: HCPCS | Performed by: ORTHOPAEDIC SURGERY

## 2020-05-08 PROCEDURE — 25010000002 VANCOMYCIN 10 G RECONSTITUTED SOLUTION: Performed by: ORTHOPAEDIC SURGERY

## 2020-05-08 PROCEDURE — 25010000003 CEFAZOLIN IN DEXTROSE 2-4 GM/100ML-% SOLUTION: Performed by: ORTHOPAEDIC SURGERY

## 2020-05-08 PROCEDURE — 97110 THERAPEUTIC EXERCISES: CPT

## 2020-05-08 PROCEDURE — 82962 GLUCOSE BLOOD TEST: CPT

## 2020-05-08 PROCEDURE — 27447 TOTAL KNEE ARTHROPLASTY: CPT | Performed by: ORTHOPAEDIC SURGERY

## 2020-05-08 PROCEDURE — 25010000002 DEXAMETHASONE PER 1 MG: Performed by: ANESTHESIOLOGY

## 2020-05-08 PROCEDURE — 27447 TOTAL KNEE ARTHROPLASTY: CPT | Performed by: NURSE PRACTITIONER

## 2020-05-08 PROCEDURE — 25010000002 KETOROLAC TROMETHAMINE PER 15 MG: Performed by: NURSE PRACTITIONER

## 2020-05-08 DEVICE — CMT BONE PALACOS R HI/VISC 1X40: Type: IMPLANTABLE DEVICE | Site: KNEE | Status: FUNCTIONAL

## 2020-05-08 DEVICE — LEGION CRUCIATE RETAINING HIGH                                    FLEX HIGHLY CROSS LINKED                                    POLYETHYLENE SIZE 5-6 11MM
Type: IMPLANTABLE DEVICE | Site: KNEE | Status: FUNCTIONAL
Brand: LEGION

## 2020-05-08 DEVICE — DEV CONTRL TISS STRATAFIX SPIRAL MNCRYL UD 3/0 PLS 30CM: Type: IMPLANTABLE DEVICE | Site: KNEE | Status: FUNCTIONAL

## 2020-05-08 DEVICE — GENESIS II NON-POROUS TIBIAL                                    BASEPLATE SIZE 5 RIGHT
Type: IMPLANTABLE DEVICE | Site: KNEE | Status: FUNCTIONAL
Brand: GENESIS II

## 2020-05-08 DEVICE — DEV CONTRL TISS STRATAFIX SYMM PDS PLUS VIL CT-1 60CM: Type: IMPLANTABLE DEVICE | Site: KNEE | Status: FUNCTIONAL

## 2020-05-08 DEVICE — LEGION CRUCIATE RETAINING OXINIUM                                    FEMORAL SIZE 5 RIGHT
Type: IMPLANTABLE DEVICE | Site: KNEE | Status: FUNCTIONAL
Brand: LEGION

## 2020-05-08 DEVICE — GEN II 7.5MM RESUR PAT 32MM
Type: IMPLANTABLE DEVICE | Site: KNEE | Status: FUNCTIONAL
Brand: GENESIS II

## 2020-05-08 DEVICE — IMPLANTABLE DEVICE: Type: IMPLANTABLE DEVICE | Site: KNEE | Status: FUNCTIONAL

## 2020-05-08 RX ORDER — ONDANSETRON 4 MG/1
4 TABLET, FILM COATED ORAL EVERY 6 HOURS PRN
Status: DISCONTINUED | OUTPATIENT
Start: 2020-05-08 | End: 2020-05-08 | Stop reason: HOSPADM

## 2020-05-08 RX ORDER — HYDROCODONE BITARTRATE AND ACETAMINOPHEN 7.5; 325 MG/1; MG/1
1-2 TABLET ORAL EVERY 4 HOURS PRN
Qty: 60 TABLET | Refills: 0 | Status: SHIPPED | OUTPATIENT
Start: 2020-05-08 | End: 2020-05-18 | Stop reason: SDUPTHER

## 2020-05-08 RX ORDER — SODIUM CHLORIDE, SODIUM LACTATE, POTASSIUM CHLORIDE, CALCIUM CHLORIDE 600; 310; 30; 20 MG/100ML; MG/100ML; MG/100ML; MG/100ML
9 INJECTION, SOLUTION INTRAVENOUS CONTINUOUS
Status: DISCONTINUED | OUTPATIENT
Start: 2020-05-08 | End: 2020-05-08 | Stop reason: HOSPADM

## 2020-05-08 RX ORDER — MIDAZOLAM HYDROCHLORIDE 1 MG/ML
1 INJECTION INTRAMUSCULAR; INTRAVENOUS
Status: DISCONTINUED | OUTPATIENT
Start: 2020-05-08 | End: 2020-05-08 | Stop reason: HOSPADM

## 2020-05-08 RX ORDER — DEXAMETHASONE SODIUM PHOSPHATE 4 MG/ML
INJECTION, SOLUTION INTRA-ARTICULAR; INTRALESIONAL; INTRAMUSCULAR; INTRAVENOUS; SOFT TISSUE AS NEEDED
Status: DISCONTINUED | OUTPATIENT
Start: 2020-05-08 | End: 2020-05-08 | Stop reason: SURG

## 2020-05-08 RX ORDER — METFORMIN HYDROCHLORIDE 500 MG/1
2000 TABLET, EXTENDED RELEASE ORAL
Status: DISCONTINUED | OUTPATIENT
Start: 2020-05-08 | End: 2020-05-08 | Stop reason: HOSPADM

## 2020-05-08 RX ORDER — ONDANSETRON 2 MG/ML
4 INJECTION INTRAMUSCULAR; INTRAVENOUS EVERY 6 HOURS PRN
Status: DISCONTINUED | OUTPATIENT
Start: 2020-05-08 | End: 2020-05-08 | Stop reason: HOSPADM

## 2020-05-08 RX ORDER — HYDROCODONE BITARTRATE AND ACETAMINOPHEN 7.5; 325 MG/1; MG/1
2 TABLET ORAL EVERY 4 HOURS PRN
Status: DISCONTINUED | OUTPATIENT
Start: 2020-05-08 | End: 2020-05-08 | Stop reason: HOSPADM

## 2020-05-08 RX ORDER — CEFAZOLIN SODIUM 2 G/100ML
2 INJECTION, SOLUTION INTRAVENOUS ONCE
Status: COMPLETED | OUTPATIENT
Start: 2020-05-08 | End: 2020-05-08

## 2020-05-08 RX ORDER — PROMETHAZINE HYDROCHLORIDE 25 MG/1
25 TABLET ORAL ONCE AS NEEDED
Status: DISCONTINUED | OUTPATIENT
Start: 2020-05-08 | End: 2020-05-08 | Stop reason: HOSPADM

## 2020-05-08 RX ORDER — WOUND DRESSING ADHESIVE - LIQUID
LIQUID MISCELLANEOUS AS NEEDED
Status: DISCONTINUED | OUTPATIENT
Start: 2020-05-08 | End: 2020-05-08 | Stop reason: HOSPADM

## 2020-05-08 RX ORDER — AMLODIPINE BESYLATE 10 MG/1
10 TABLET ORAL DAILY
Status: DISCONTINUED | OUTPATIENT
Start: 2020-05-08 | End: 2020-05-08 | Stop reason: HOSPADM

## 2020-05-08 RX ORDER — CEFAZOLIN SODIUM IN 0.9 % NACL 3 G/100 ML
3 INTRAVENOUS SOLUTION, PIGGYBACK (ML) INTRAVENOUS EVERY 8 HOURS
Status: DISCONTINUED | OUTPATIENT
Start: 2020-05-08 | End: 2020-05-08 | Stop reason: HOSPADM

## 2020-05-08 RX ORDER — PROMETHAZINE HYDROCHLORIDE 25 MG/1
25 SUPPOSITORY RECTAL ONCE AS NEEDED
Status: DISCONTINUED | OUTPATIENT
Start: 2020-05-08 | End: 2020-05-08 | Stop reason: HOSPADM

## 2020-05-08 RX ORDER — FAMOTIDINE 10 MG/ML
20 INJECTION, SOLUTION INTRAVENOUS ONCE
Status: COMPLETED | OUTPATIENT
Start: 2020-05-08 | End: 2020-05-08

## 2020-05-08 RX ORDER — FENTANYL CITRATE 50 UG/ML
50 INJECTION, SOLUTION INTRAMUSCULAR; INTRAVENOUS
Status: DISCONTINUED | OUTPATIENT
Start: 2020-05-08 | End: 2020-05-08 | Stop reason: HOSPADM

## 2020-05-08 RX ORDER — LIDOCAINE HYDROCHLORIDE 10 MG/ML
0.5 INJECTION, SOLUTION EPIDURAL; INFILTRATION; INTRACAUDAL; PERINEURAL ONCE AS NEEDED
Status: COMPLETED | OUTPATIENT
Start: 2020-05-08 | End: 2020-05-08

## 2020-05-08 RX ORDER — HYDROMORPHONE HYDROCHLORIDE 1 MG/ML
0.25 INJECTION, SOLUTION INTRAMUSCULAR; INTRAVENOUS; SUBCUTANEOUS
Status: DISCONTINUED | OUTPATIENT
Start: 2020-05-08 | End: 2020-05-08 | Stop reason: HOSPADM

## 2020-05-08 RX ORDER — LEVOTHYROXINE SODIUM 175 UG/1
175 TABLET ORAL DAILY
Status: DISCONTINUED | OUTPATIENT
Start: 2020-05-08 | End: 2020-05-08 | Stop reason: HOSPADM

## 2020-05-08 RX ORDER — LOSARTAN POTASSIUM 100 MG/1
100 TABLET ORAL
Status: DISCONTINUED | OUTPATIENT
Start: 2020-05-08 | End: 2020-05-08 | Stop reason: HOSPADM

## 2020-05-08 RX ORDER — ACETAMINOPHEN 325 MG/1
650 TABLET ORAL ONCE AS NEEDED
Status: DISCONTINUED | OUTPATIENT
Start: 2020-05-08 | End: 2020-05-08 | Stop reason: HOSPADM

## 2020-05-08 RX ORDER — MELOXICAM 15 MG/1
15 TABLET ORAL DAILY
Qty: 14 TABLET | Refills: 0 | Status: SHIPPED | OUTPATIENT
Start: 2020-05-09 | End: 2020-05-23

## 2020-05-08 RX ORDER — DIPHENHYDRAMINE HYDROCHLORIDE 50 MG/ML
12.5 INJECTION INTRAMUSCULAR; INTRAVENOUS
Status: DISCONTINUED | OUTPATIENT
Start: 2020-05-08 | End: 2020-05-08 | Stop reason: HOSPADM

## 2020-05-08 RX ORDER — SODIUM CHLORIDE 0.9 % (FLUSH) 0.9 %
3-10 SYRINGE (ML) INJECTION AS NEEDED
Status: DISCONTINUED | OUTPATIENT
Start: 2020-05-08 | End: 2020-05-08 | Stop reason: HOSPADM

## 2020-05-08 RX ORDER — PREGABALIN 75 MG/1
150 CAPSULE ORAL ONCE
Status: COMPLETED | OUTPATIENT
Start: 2020-05-08 | End: 2020-05-08

## 2020-05-08 RX ORDER — PROMETHAZINE HYDROCHLORIDE 25 MG/ML
6.25 INJECTION, SOLUTION INTRAMUSCULAR; INTRAVENOUS ONCE AS NEEDED
Status: DISCONTINUED | OUTPATIENT
Start: 2020-05-08 | End: 2020-05-08 | Stop reason: HOSPADM

## 2020-05-08 RX ORDER — KETOROLAC TROMETHAMINE 30 MG/ML
30 INJECTION, SOLUTION INTRAMUSCULAR; INTRAVENOUS EVERY 8 HOURS
Status: DISCONTINUED | OUTPATIENT
Start: 2020-05-08 | End: 2020-05-08 | Stop reason: HOSPADM

## 2020-05-08 RX ORDER — ACETAMINOPHEN 10 MG/ML
1000 INJECTION, SOLUTION INTRAVENOUS ONCE
Status: COMPLETED | OUTPATIENT
Start: 2020-05-08 | End: 2020-05-08

## 2020-05-08 RX ORDER — HYDRALAZINE HYDROCHLORIDE 20 MG/ML
5 INJECTION INTRAMUSCULAR; INTRAVENOUS
Status: DISCONTINUED | OUTPATIENT
Start: 2020-05-08 | End: 2020-05-08 | Stop reason: HOSPADM

## 2020-05-08 RX ORDER — ONDANSETRON 2 MG/ML
4 INJECTION INTRAMUSCULAR; INTRAVENOUS ONCE AS NEEDED
Status: DISCONTINUED | OUTPATIENT
Start: 2020-05-08 | End: 2020-05-08 | Stop reason: HOSPADM

## 2020-05-08 RX ORDER — TRANEXAMIC ACID 100 MG/ML
INJECTION, SOLUTION INTRAVENOUS AS NEEDED
Status: DISCONTINUED | OUTPATIENT
Start: 2020-05-08 | End: 2020-05-08 | Stop reason: SURG

## 2020-05-08 RX ORDER — MELOXICAM 15 MG/1
15 TABLET ORAL DAILY
Status: DISCONTINUED | OUTPATIENT
Start: 2020-05-09 | End: 2020-05-08 | Stop reason: HOSPADM

## 2020-05-08 RX ORDER — PANTOPRAZOLE SODIUM 40 MG/1
40 TABLET, DELAYED RELEASE ORAL EVERY MORNING
Status: DISCONTINUED | OUTPATIENT
Start: 2020-05-08 | End: 2020-05-08 | Stop reason: HOSPADM

## 2020-05-08 RX ORDER — MEPERIDINE HYDROCHLORIDE 25 MG/ML
12.5 INJECTION INTRAMUSCULAR; INTRAVENOUS; SUBCUTANEOUS
Status: DISCONTINUED | OUTPATIENT
Start: 2020-05-08 | End: 2020-05-08 | Stop reason: HOSPADM

## 2020-05-08 RX ORDER — PIOGLITAZONEHYDROCHLORIDE 30 MG/1
30 TABLET ORAL DAILY
Status: DISCONTINUED | OUTPATIENT
Start: 2020-05-08 | End: 2020-05-08 | Stop reason: HOSPADM

## 2020-05-08 RX ORDER — MIDAZOLAM HYDROCHLORIDE 1 MG/ML
2 INJECTION INTRAMUSCULAR; INTRAVENOUS
Status: DISCONTINUED | OUTPATIENT
Start: 2020-05-08 | End: 2020-05-08 | Stop reason: HOSPADM

## 2020-05-08 RX ORDER — ONDANSETRON 4 MG/1
4 TABLET, FILM COATED ORAL EVERY 6 HOURS PRN
Qty: 10 TABLET | Refills: 0 | Status: SHIPPED | OUTPATIENT
Start: 2020-05-08 | End: 2020-05-18

## 2020-05-08 RX ORDER — PROPOFOL 10 MG/ML
VIAL (ML) INTRAVENOUS CONTINUOUS PRN
Status: DISCONTINUED | OUTPATIENT
Start: 2020-05-08 | End: 2020-05-08 | Stop reason: SURG

## 2020-05-08 RX ORDER — FENTANYL CITRATE 50 UG/ML
100 INJECTION, SOLUTION INTRAMUSCULAR; INTRAVENOUS
Status: DISCONTINUED | OUTPATIENT
Start: 2020-05-08 | End: 2020-05-08 | Stop reason: HOSPADM

## 2020-05-08 RX ORDER — MAGNESIUM HYDROXIDE 1200 MG/15ML
LIQUID ORAL AS NEEDED
Status: DISCONTINUED | OUTPATIENT
Start: 2020-05-08 | End: 2020-05-08 | Stop reason: HOSPADM

## 2020-05-08 RX ORDER — ACETAMINOPHEN 650 MG/1
650 SUPPOSITORY RECTAL ONCE AS NEEDED
Status: DISCONTINUED | OUTPATIENT
Start: 2020-05-08 | End: 2020-05-08 | Stop reason: HOSPADM

## 2020-05-08 RX ORDER — HYDROCODONE BITARTRATE AND ACETAMINOPHEN 7.5; 325 MG/1; MG/1
1 TABLET ORAL EVERY 4 HOURS PRN
Status: DISCONTINUED | OUTPATIENT
Start: 2020-05-08 | End: 2020-05-08 | Stop reason: HOSPADM

## 2020-05-08 RX ORDER — LABETALOL HYDROCHLORIDE 5 MG/ML
5 INJECTION, SOLUTION INTRAVENOUS
Status: DISCONTINUED | OUTPATIENT
Start: 2020-05-08 | End: 2020-05-08 | Stop reason: HOSPADM

## 2020-05-08 RX ORDER — POLYETHYLENE GLYCOL 3350 17 G/17G
17 POWDER, FOR SOLUTION ORAL 2 TIMES DAILY
Qty: 238 G | Refills: 0 | Status: SHIPPED | OUTPATIENT
Start: 2020-05-08 | End: 2020-05-15

## 2020-05-08 RX ORDER — EPHEDRINE SULFATE 50 MG/ML
INJECTION, SOLUTION INTRAVENOUS AS NEEDED
Status: DISCONTINUED | OUTPATIENT
Start: 2020-05-08 | End: 2020-05-08 | Stop reason: SURG

## 2020-05-08 RX ORDER — UREA 10 %
3 LOTION (ML) TOPICAL NIGHTLY PRN
Status: DISCONTINUED | OUTPATIENT
Start: 2020-05-08 | End: 2020-05-08 | Stop reason: HOSPADM

## 2020-05-08 RX ORDER — PANTOPRAZOLE SODIUM 40 MG/1
40 TABLET, DELAYED RELEASE ORAL EVERY MORNING
Qty: 14 TABLET | Refills: 0 | Status: SHIPPED | OUTPATIENT
Start: 2020-05-09 | End: 2020-05-23

## 2020-05-08 RX ORDER — ASPIRIN 325 MG
325 TABLET, DELAYED RELEASE (ENTERIC COATED) ORAL EVERY 12 HOURS SCHEDULED
Status: DISCONTINUED | OUTPATIENT
Start: 2020-05-09 | End: 2020-05-08 | Stop reason: HOSPADM

## 2020-05-08 RX ORDER — MELOXICAM 15 MG/1
15 TABLET ORAL ONCE
Status: COMPLETED | OUTPATIENT
Start: 2020-05-08 | End: 2020-05-08

## 2020-05-08 RX ORDER — BUPIVACAINE HYDROCHLORIDE 7.5 MG/ML
INJECTION, SOLUTION EPIDURAL; RETROBULBAR
Status: COMPLETED | OUTPATIENT
Start: 2020-05-08 | End: 2020-05-08

## 2020-05-08 RX ORDER — SODIUM CHLORIDE 0.9 % (FLUSH) 0.9 %
3 SYRINGE (ML) INJECTION EVERY 12 HOURS SCHEDULED
Status: DISCONTINUED | OUTPATIENT
Start: 2020-05-08 | End: 2020-05-08 | Stop reason: HOSPADM

## 2020-05-08 RX ADMIN — PHENYLEPHRINE HYDROCHLORIDE 100 MCG: 10 INJECTION INTRAVENOUS at 09:20

## 2020-05-08 RX ADMIN — MIDAZOLAM 1 MG: 1 INJECTION INTRAMUSCULAR; INTRAVENOUS at 07:58

## 2020-05-08 RX ADMIN — EPHEDRINE SULFATE 5 MG: 50 INJECTION INTRAVENOUS at 08:46

## 2020-05-08 RX ADMIN — ACETAMINOPHEN 1000 MG: 10 INJECTION, SOLUTION INTRAVENOUS at 08:32

## 2020-05-08 RX ADMIN — PHENYLEPHRINE HYDROCHLORIDE 150 MCG: 10 INJECTION INTRAVENOUS at 09:57

## 2020-05-08 RX ADMIN — PHENYLEPHRINE HYDROCHLORIDE 100 MCG: 10 INJECTION INTRAVENOUS at 08:32

## 2020-05-08 RX ADMIN — PHENYLEPHRINE HYDROCHLORIDE 100 MCG: 10 INJECTION INTRAVENOUS at 08:41

## 2020-05-08 RX ADMIN — KETOROLAC TROMETHAMINE 30 MG: 30 INJECTION, SOLUTION INTRAMUSCULAR at 11:33

## 2020-05-08 RX ADMIN — DEXAMETHASONE SODIUM PHOSPHATE 10 MG: 4 INJECTION INTRA-ARTICULAR; INTRALESIONAL; INTRAMUSCULAR; INTRAVENOUS; SOFT TISSUE at 08:27

## 2020-05-08 RX ADMIN — FAMOTIDINE 20 MG: 10 INJECTION, SOLUTION INTRAVENOUS at 07:55

## 2020-05-08 RX ADMIN — VANCOMYCIN HYDROCHLORIDE 1750 MG: 10 INJECTION, POWDER, LYOPHILIZED, FOR SOLUTION INTRAVENOUS at 07:35

## 2020-05-08 RX ADMIN — CEFAZOLIN SODIUM 2 G: 2 INJECTION, SOLUTION INTRAVENOUS at 08:21

## 2020-05-08 RX ADMIN — TRANEXAMIC ACID 1000 MG: 100 INJECTION, SOLUTION INTRAVENOUS at 09:22

## 2020-05-08 RX ADMIN — BUPIVACAINE HYDROCHLORIDE 2 ML: 7.5 INJECTION, SOLUTION EPIDURAL; RETROBULBAR at 08:19

## 2020-05-08 RX ADMIN — PHENYLEPHRINE HYDROCHLORIDE 100 MCG: 10 INJECTION INTRAVENOUS at 08:59

## 2020-05-08 RX ADMIN — PROPOFOL 75 MCG/KG/MIN: 10 INJECTION, EMULSION INTRAVENOUS at 08:26

## 2020-05-08 RX ADMIN — HYDROCODONE BITARTRATE AND ACETAMINOPHEN 2 TABLET: 7.5; 325 TABLET ORAL at 14:04

## 2020-05-08 RX ADMIN — EPHEDRINE SULFATE 5 MG: 50 INJECTION INTRAVENOUS at 09:25

## 2020-05-08 RX ADMIN — MELOXICAM 15 MG: 15 TABLET ORAL at 07:35

## 2020-05-08 RX ADMIN — EPHEDRINE SULFATE 5 MG: 50 INJECTION INTRAVENOUS at 08:33

## 2020-05-08 RX ADMIN — LIDOCAINE HYDROCHLORIDE 0.5 ML: 10 INJECTION, SOLUTION EPIDURAL; INFILTRATION; INTRACAUDAL; PERINEURAL at 07:30

## 2020-05-08 RX ADMIN — SODIUM CHLORIDE, POTASSIUM CHLORIDE, SODIUM LACTATE AND CALCIUM CHLORIDE 9 ML/HR: 600; 310; 30; 20 INJECTION, SOLUTION INTRAVENOUS at 07:30

## 2020-05-08 RX ADMIN — MIDAZOLAM 1 MG: 1 INJECTION INTRAMUSCULAR; INTRAVENOUS at 08:16

## 2020-05-08 RX ADMIN — MIDAZOLAM 1 MG: 1 INJECTION INTRAMUSCULAR; INTRAVENOUS at 08:33

## 2020-05-08 RX ADMIN — PREGABALIN 150 MG: 75 CAPSULE ORAL at 07:35

## 2020-05-08 NOTE — ANESTHESIA PREPROCEDURE EVALUATION
Anesthesia Evaluation     Patient summary reviewed and Nursing notes reviewed   NPO Solid Status: > 8 hours             Airway   Mallampati: II  TM distance: >3 FB  Neck ROM: full  no difficulty expected  Dental - normal exam     Pulmonary - negative pulmonary ROS and normal exam   Cardiovascular - normal exam    (+) hypertension,       Neuro/Psych- negative ROS  GI/Hepatic/Renal/Endo    (+) morbid obesity,  diabetes mellitus,     Musculoskeletal (-) negative ROS    Abdominal  - normal exam   Substance History - negative use     OB/GYN negative ob/gyn ROS         Other                        Anesthesia Plan    ASA 3     spinal       Anesthetic plan, all risks, benefits, and alternatives have been provided, discussed and informed consent has been obtained with: patient.
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
## 2020-05-08 NOTE — OUTREACH NOTE
Prep Survey      Responses   [facility] facility patient discharged from?  [facility]   Is LACE score < 7 ?  Yes   Eligibility  [facility]   Date of Admission  05/08/20   Date of Discharge  05/08/20   Discharge Disposition  Home-Health Care Sv   Discharge diagnosis  Total knee arthro   COVID-19 Test Status  Negative   Does the patient have one of the following disease processes/diagnoses(primary or secondary)?  Total Joint Replacement   Does the patient have Home health ordered?  Yes   [facility]   Is there a DME ordered?  No   Prep survey completed?  Yes          Mary Ellen Kim RN

## 2020-05-08 NOTE — PROGRESS NOTES
Case Management Discharge Note      Final Note: Rec'd call from [facility] RN/Meliza Pt to dc home today w/ spouse he would like [facility] for HH. [facility] notified of same-day dc.          Destination      No service has been selected for the patient.      Durable Medical Equipment      No service has been selected for the patient.      Dialysis/Infusion      No service has been selected for the patient.      Home Medical Care      No service has been selected for the patient.      Therapy      No service has been selected for the patient.      Community Resources      No service has been selected for the patient.

## 2020-05-08 NOTE — DISCHARGE SUMMARY
Patient Name: Hal Alexandre  Patient YOB: 1965    Date of Admission:  5/8/2020  Date of Discharge:  5/8/2020  Discharge Diagnosis: TOTAL KNEE ARTHROPLASTY  Presenting Problem/History of Present Illness: Primary osteoarthritis of right knee [M17.11]  Primary osteoarthritis of right knee [M17.11]  Admitting Physician: Dr Miko Mclaughlin  Consults:   Consults     No orders found from 4/9/2020 to 5/9/2020.          DETAILS OF HOSPITAL STAY:  Patient is a 54 y.o. male was taken to outpatient recovery following the above procedure and underwent an uncomplicated hospital stay.  Patient did well with physical therapy and was ambulating well without problems.  On the day of discharge the wound was clean, dry and intact and calf was soft and non tender and Homans sign was negative.  Patient was tolerating  without problems.  Patient will be discharged home.    Condition on Discharge:  Stable    Vital Signs  Temp:  [96.9 °F (36.1 °C)-98.3 °F (36.8 °C)] 97.4 °F (36.3 °C)  Heart Rate:  [69-85] 76  Resp:  [16-18] 18  BP: ()/(41-98) 122/79    LABS:      Admission on 05/08/2020   Component Date Value Ref Range Status   • Glucose 05/08/2020 140* 70 - 130 mg/dL Final   • Glucose 05/08/2020 136* 70 - 130 mg/dL Final       No results found.    Discharge Medications     Discharge Medications      New Medications      Instructions Start Date   aspirin 325 MG EC tablet   325 mg, Oral, Every 12 Hours Scheduled   Start Date:  May 9, 2020     meloxicam 15 MG tablet  Commonly known as:  MOBIC   15 mg, Oral, Daily   Start Date:  May 9, 2020     ondansetron 4 MG tablet  Commonly known as:  ZOFRAN   4 mg, Oral, Every 6 Hours PRN      pantoprazole 40 MG EC tablet  Commonly known as:  PROTONIX   40 mg, Oral, Every Morning   Start Date:  May 9, 2020     polyethylene glycol 17 g packet  Commonly known as:  MIRALAX   17 g, Oral, 2 Times Daily         Continue These Medications      Instructions Start Date   amLODIPine 10 MG  tablet  Commonly known as:  NORVASC   10 mg, Oral, Daily, for blood pressure      atorvastatin 40 MG tablet  Commonly known as:  LIPITOR   40 mg, Oral, Daily      ezetimibe 10 MG tablet  Commonly known as:  ZETIA   10 mg, Oral, Nightly      irbesartan 300 MG tablet  Commonly known as:  AVAPRO   300 mg, Oral, Daily      levothyroxine 175 MCG tablet  Commonly known as:  SYNTHROID, LEVOTHROID   175 mcg, Oral, Daily      metFORMIN  MG 24 hr tablet  Commonly known as:  GLUCOPHAGE-XR   2,000 mg, Oral, Daily With Breakfast      pioglitazone 30 MG tablet  Commonly known as:  ACTOS   30 mg, Oral, Daily, For DMII; needs labs!!!!!!!  30 day Rx; lab order sent and make appt      vitamin D 1.25 MG (30386 UT) capsule capsule  Commonly known as:  ERGOCALCIFEROL   50,000 Units, Oral, Every 7 Days         Stop These Medications    Bactroban Nasal 2 % nasal ointment  Generic drug:  mupirocin     Chlorhexidine Gluconate Cloth 2 % pads            Activity at Discharge:     Discharge Instructions: Patient is to continue with physical therapy exercises daily and continue working with the physical therapist as ordered. Patient may weight bear as tolerated. Apply ice regularly. Patient may ice for long periods of time as long as ice is not directly on the skin. Patient instructed on frequent calf pumping exercises.  Patient also instructed on incentive spirometer during hospitalization and encouraged to continue to use at home regularly.    Dressing: The dressing is designed to be left in place until you return to the office in 2 weeks.  The suction unit should stop functioning at 7 days and the green light will switch to yellow.  At that point the suction unit and tubing can be disconnected at the port closest to the dressing.  The suction unit and tubing may be discarded.  You may shower immediately upon return home, you will need to turn the pump off by depressing the orange button once and then you may disconnect the pump and  tubing at the connection port.  After showering, shake off the excess water and reattach the tubing.  Restart the pump by depressing the orange button one time and you will notice the green light flashing again.  If the dressing becomes disloged or saturated it should be changed. Please refer to the GWEN information sheet if you have any questions about the dressing.  If you have a home health nurse or therapist they can be contacted to assist with dressing change or repair. You may also call the GWEN dressing hotline for questions related to the dressing (1-406.933.6775). If there still other problems or questions related to the dressing despite these measures then you can contact Susanne at our office [phone].  If for some reason the GWEN dressing is removed, after 7 days the wound can be gently cleaned with antibacterial soap then allowed to dry and covered with a dry sterile dressing. The wound should be covered at all times except while showering.  Patient may change dressings daily and prn using sterile 4x4 and paper tape, and should call if any unusual drainage, redness or swelling.*  Follow up appointment in 2 weeks - patient to call the office at [phone] to schedule.  Patient will be discharged on aspirin 325mg BID x 2weeks, then daily x 4weeks    Discharge Diagnosis:    Patient Active Problem List   Diagnosis   • Essential hypertension   • Other hyperlipidemia   • Acquired hypothyroidism   • Chronic pain of right knee   • Tear of medial meniscus of right knee, current   • Type 2 diabetes mellitus with hyperglycemia, without long-term current use of insulin (CMS/[facility])   • Ingrown toenail of right foot   • Onychomycosis of great toe   • Primary osteoarthritis of right knee       Follow-up Appointments  No future appointments.  Additional Instructions for the Follow-ups that You Need to Schedule     Referral to home health   As directed      PT to see for Home PT protocol. Daily for first week then 3x/ wk  for second week. Staples to be removed at f/u appointment in 2 weeks.    Order Comments:  PT to see for Home PT protocol. Daily for first week then 3x/ wk for second week. Staples to be removed at f/u appointment in 2 weeks.     Face to Face Visit Date:  5/8/2020    Follow-up provider for Plan of Care?:  I will be treating the patient on an ongoing basis.  Please send me the Plan of Care for signature.    Follow-up provider:  AARON MCLAUGHLIN [[id number]]    Reason/Clinical Findings:  post op knee replacement    Describe mobility limitations that make leaving home difficult:  pain, swelling, weakness, limited mobility, difficulty ambulating without assistive device    Nursing/Therapeutic Services Requested:  Physicial Therapy                  Aaron Mclaughlin MD  05/08/20  12:48

## 2020-05-08 NOTE — OP NOTE
Name: Hal Alexandre  YOB: 1965    DATE OF SURGERY: 5/8/2020    PREOPERATIVE DIAGNOSIS: Right knee end-stage osteoarthritis    POSTOPERATIVE DIAGNOSIS: Right knee end-stage osteoarthritis    PROCEDURE PERFORMED: Right total knee replacement    SURGEON: Miko Mclaughlin M.D.    ASSISTANT: RABIA LYONS    IMPLANTS: [employer] Nephew Legion:     Implant Name Type Inv. Item Serial No.  Lot No. LRB No. Used   CMT BONE PALACOS R HI/VISC 1X40 - [id number] Implant CMT BONE PALACOS R HI/VISC 1X40  [facility] [id number] Right 2   SUT CONTRL TISS STRATAFIX SPIRAL MNCRYL UD 3/0 PLS 30CM - [id number] Implant SUT CONTRL TISS STRATAFIX SPIRAL MNCRYL UD 3/0 PLS 30CM  [employer]  DIV OF J AND J [id number] Right 1   SUT CONTRL TISS STRATAFIX SYMM PDS PLUS LISANDRO CT-1 60CM - [id number] Implant SUT CONTRL TISS STRATAFIX SYMM PDS PLUS LISANDRO CT-1 60CM  [employer]  DIV OF J AND J [id number] Right 1   BASE TIB/KN GEN2 NONPOR TI SZ5 RT - [id number] Implant BASE TIB/KN GEN2 NONPOR TI SZ5 RT  [employer] [id number] Right 1   COMP FEM LEGION OXINIUM CR SZ5 RT - [id number] Implant COMP FEM LEGION OXINIUM CR SZ5 RT  [employer] [id number] Right 1   PATELLA RESRF GEN2 7.5X32MM - [id number] Implant PATELLA RESRF GEN2 7.5X32MM  [employer] [id number] Right 1   INSRT ART LEGION CR HF XLPE SZ5TO6 11MM - [id number] Implant INSRT ART LEGION CR HF XLPE SZ5TO6 11MM  [employer] [id number] Right 1       Estimated Blood Loss: 200cc  Specimens : none  Complications: none    DESCRIPTION OF PROCEDURE: The patient was taken to the operating room and placed in the supine position. A sequential compression device was carefully placed on the non-operative leg. Preoperative antibiotics were administered. Surgical time out was performed. After adequate induction of anesthesia, the leg was prepped and draped in the usual sterile fashion, exsanguinated with an Esmarch bandage and the tourniquet inflated to 250 mmHg. A midline  incision was performed followed by a medial parapatellar arthrotomy. The patella was subluxed laterally.  A portion of the fat pad, ACL, and anterior horns of the meniscus were excised. The drill hole was placed in the distal femur and the canal was the irrigated and suctioned. The IM pee was placed and a 5 degree distal valgus cut was performed on the femur. The femur was then sized with a sizing guide. The femoral cutting block was placed and all femoral cuts were performed. The proximal tibia was exposed. We used the extramedullary tibial cutting guide set for removal of 9mm of bone off the high side. The tibial cut was performed. The posterior horns of the menisci were excised. The posterior osteophytes were removed. Flexion extension blocks were then used to balance the knee. The tibial cut surface was then sized with the sizing templates and the tibial and femoral trial were then placed. The knee was placed in full extension and then the tibial tray rotation was then matched to the femoral rotation and marked.    Attention was then placed to the patella. The patella was noted to track centrally through range of motion. The patella was then sized with the trials. The thickness of the patella was then measured. The patella was resurfaced and the surrounding osteophytes were removed. The preoperative thickness was reproduced. The patella tracked centrally through range of motion.   At this point all trial components were removed, the knee was copiously irrigated with pulsed lavage, and the knee was injected with anesthetic cocktail solution. The cut surfaces were then dried with clean lap sponges, and the components were cemented tibia, followed by femur, then patella. The knee was held in full extension and all excess cement was removed. The knee was held still until the cement had completely hardened. We then placed the trial polyethylene spacer which resulted in full extension and excellent flexion-extension  balance. We placed the final polyethylene spacer.   The knee was then copiously irrigated. The tourniquet was then released. There was excellent hemostasis. We placed a one-eighth inch Hemovac drain. We closed the knee in multiple layers in standard fashion. Sterile dressing were applied. At the end of the case, the sponge and needle counts were reported as being correct. There were no known complications. The patient was then transported to the recovery room.      Miko Mclaughlin M.D.

## 2020-05-08 NOTE — ANESTHESIA POSTPROCEDURE EVALUATION
Patient: Hal Alexandre    Procedure Summary     Date:  05/08/20 Room / Location:   [facility]EP [facility] OR  /  DEEP OR [facility]    Anesthesia Start:  0807 Anesthesia Stop:  1010    Procedure:  TOTAL KNEE ARTHROPLASTY (Right Knee) Diagnosis:       Primary osteoarthritis of right knee      (Primary osteoarthritis of right knee [M17.11])    Surgeon:  Miko Mclaughlin MD Provider:  Baldo Landeros MD    Anesthesia Type:  spinal ASA Status:  3          Anesthesia Type: spinal    Vitals  Vitals Value Taken Time   /70 5/8/2020 11:30 AM   Temp 36.3 °C (97.4 °F) 5/8/2020 11:30 AM   Pulse 75 5/8/2020 11:39 AM   Resp 16 5/8/2020 11:30 AM   SpO2 95 % 5/8/2020 11:39 AM   Vitals shown include unvalidated device data.        Post Anesthesia Care and Evaluation    Patient location during evaluation: bedside  Patient participation: complete - patient participated  Level of consciousness: awake  Pain management: adequate  Airway patency: patent  Anesthetic complications: No anesthetic complications    Cardiovascular status: acceptable  Respiratory status: acceptable  Hydration status: acceptable    Comments: */79   Pulse 76   Temp 36.3 °C (97.4 °F) (Temporal)   Resp 18   Wt 124 kg (273 lb 5.9 oz)   SpO2 98%   BMI 40.37 kg/m²

## 2020-05-08 NOTE — DISCHARGE PLACEMENT REQUEST
"Hal Alexandre (54 y.o. Male)     Date of Birth Social Security Number Address Home Phone MRN    1965  [id number]  [facility] [address] [phone] [id number]    Mandaen Marital Status          None        Admission Date Admission Type Admitting Provider Attending Provider Department, Room/Bed    5/8/20 Elective Miko Mclaughlin MD Brown, Reid B, MD [facility] OR, [facility] OR/[facility] OR    Discharge Date Discharge Disposition Discharge Destination                       Attending Provider:  Miko Mclaughlin MD    Allergies:  No Known Allergies    Isolation:  None   Infection:  None   Code Status:  CPR    Ht:  175.3 cm (69\")   Wt:  124 kg (273 lb 5.9 oz)    Admission Cmt:  None   Principal Problem:  Primary osteoarthritis of right knee [M17.11]                 Active Insurance as of 5/8/2020     Primary Coverage     Payor Plan Insurance Group Employer/Plan Group    ANTHEM BLUE CROSS ANTHEM BLUE CROSS BLUE SHIELD PPO [id number]     Payor Plan Address Payor Plan Phone Number Payor Plan Fax Number Effective Dates    [address] [phone]  1/1/2017 - None Entered    [facility] [id number]       Subscriber Name Subscriber Birth Date Member ID       HAL ALEXANDRE 1965 [id number]                 Emergency Contacts      (Rel.) Home Phone Work Phone Mobile Phone    Sarah Sauceda (Spouse) -- -- [phone]              "

## 2020-05-08 NOTE — PLAN OF CARE
Problem: Patient Care Overview  Goal: Plan of Care Review  Flowsheets (Taken 5/8/2020 1404)  Plan of Care Reviewed With: patient  Outcome Summary: patient presents to [facility] for R TKA. PMH significant for DM, HTN, morbid obesity. Patient reports he needs a rwx upon d/c, lives with spouse, has 2 steps to enter home with 2 HR. Patient performed bed mobility independently, sit to stand with supervision and rwx, ambulated 100 feet with rwx and CGA with normal bright and steady gait. Patient performed 10 reps per TKR protocol. anticipate d/c home today with spouse, HHPT to f/u.      Patient was wearing a face mask during this therapy encounter. Therapist used appropriate personal protective equipment including mask and gloves.  Mask used was standard procedure mask. Appropriate PPE was worn during the entire therapy session. Hand hygiene was completed before and after therapy session. Patient is not in enhanced droplet precautions.

## 2020-05-11 ENCOUNTER — TRANSITIONAL CARE MANAGEMENT TELEPHONE ENCOUNTER (OUTPATIENT)
Dept: CALL CENTER | Facility: HOSPITAL | Age: 55
End: 2020-05-11

## 2020-05-11 ENCOUNTER — TELEPHONE (OUTPATIENT)
Dept: ORTHOPEDIC SURGERY | Facility: HOSPITAL | Age: 55
End: 2020-05-11

## 2020-05-11 NOTE — OUTREACH NOTE
Call Center TCM Note      Responses   [facility] patient discharged from?  [place]   Does the patient have one of the following disease processes/diagnoses(primary or secondary)?  Total Joint Replacement   Joint surgery performed?  Knee   TCM attempt successful?  Yes   Call start time  1602   Call end time  1606   Has the patient been back in either the hospital or Emergency Department since discharge?  Yes   Discharge diagnosis  Total knee arthro   Does the patient have all medications related to this admission filled (includes all antibiotics, pain medications, etc.)  Yes   Is the patient taking all medications as directed (includes completed medication regime)?  Yes   Is the patient able to teach back alternate methods of pain control?  Reposition, Correct alignment, Ice, Knee-elevation/no pillow under knee   Does the patient have a follow up appointment with their surgeon?  Yes [5/22/20]   Has the patient kept scheduled appointments due by today?  N/A   What is the Home health agency?   [facility]   Has home health visited the patient within 72 hours of discharge?  Yes   Psychosocial issues?  No   Did the patient receive a copy of their discharge instructions?  Yes   Nursing interventions  Reviewed instructions with patient   What is the patient's perception of their functional status since discharge?  Improving   Is the patient able to teach back signs and symptoms of infection?  Temp >100.4 for 24h or longer, Incisional drainage, Blisters around incision, Changes in mobility, Severe discomfort or pain, Shortness of breath or chest pain, Increased swelling or redness around incision (not associated with surgical edema)   Is the patient able to teach back how to prevent infection?  Check incision daily, Wash hands before and after touching incision   Is the patient able to teach back signs and symptoms of DVT?  Redness in calf, Swelling in calf, Severe pain in calf, Area hot to touch, Shortness of breath or  chest pain   Is the patient/caregiver able to teach back the hierarchy of who to call/visit for symptoms/problems? PCP, Specialist, Home health nurse, Urgent Care, ED, 911  Yes   TCM call completed?  Yes          Farideh Rivera RN    5/11/2020, 16:06

## 2020-05-11 NOTE — TELEPHONE ENCOUNTER
Date of call:  5/11/2020  Call Notes:  Spoke with patient and spouse.  Post op day 3  Discharge Instructions: Patient/spouse verbalized understanding of discharge instruction  Ask patient about his or her discharge instructions  ?  Patient confirmed understanding   ?  Further instruction needed   What, if any, recommendations, teaching, or interventions did you provide? Increase ambulation, water intake, and stool softener use to help with BM’s while taking prescribed narcotics  Health status:  Pain controlled  Yes   Incision/dressing status   ?  Clean without redness, drainage, odor  ?  Redness    ?  Drainage - color Click or tap here to enter text.  ?  Odor  GWEN - Green light blinking Yes  Difficulties urination No  Last BM 5/10/2020 (if no BM by day 3-recommend OTC suppository or fleets enema)  Medications:  ?Medications reviewed with patient/family/caregiver  Patient taking medications as prescribed?   Yes  If not taking medications as prescribed, note specific medicine(s) and reason for each:  Click or tap here to enter text.  Hospital Follow Up Plan:  Follow up Appointment with Orthopedic surgeon:  ?Has f/u appointment                ?Scheduled f/u appointment  Home Care ordered at discharge?    Yes        Agency: [facility]  Home Care started, or contact made?    Yes   If no, action taken: Click or tap here to enter text.  DME obtained/used in home?         Yes   Recommendations to patient:  Increase water intake, daily Metamucil to help with BM, try stool softener

## 2020-05-13 ENCOUNTER — TELEPHONE (OUTPATIENT)
Dept: ORTHOPEDIC SURGERY | Facility: HOSPITAL | Age: 55
End: 2020-05-13

## 2020-05-13 NOTE — TELEPHONE ENCOUNTER
Date of call:  5/13/2020  Call Notes:  Spoke with patient.  Post op day 5  Discharge Instructions: Patient verbalized understanding of discharge instructions.    Ask patient about his or her discharge instructions  ?  Patient confirmed understanding   ?  Further instruction needed    What, if any, recommendations, teaching, or interventions did you provide?     RICE education to prevent further swelling    Health status:  Pain controlled:  Yes     Incision/dressing status   ?  Clean without redness, drainage, odor  ?  Redness    ?  Drainage - color Click or tap here to enter text.  ?  Odor    GWEN - Green light blinking Yes    Difficulties urinating?  No    Last BM: 5/12/2020 (no difficulty)    Medications:  ?Medications reviewed with patient/family/caregiver  Patient taking medications as prescribed?   Yes    Hospital Follow Up Plan:  Follow up Appointment with Orthopedic surgeon:  ?Has f/u appointment                ?Scheduled f/u appointment    Home Care ordered at discharge?    Yes        Agency: [facility]  Home Care started, or contact made?    Yes

## 2020-05-18 DIAGNOSIS — M17.11 PRIMARY OSTEOARTHRITIS OF RIGHT KNEE: ICD-10-CM

## 2020-05-18 RX ORDER — HYDROCODONE BITARTRATE AND ACETAMINOPHEN 7.5; 325 MG/1; MG/1
1-2 TABLET ORAL EVERY 4 HOURS PRN
Qty: 50 TABLET | Refills: 0 | Status: SHIPPED | OUTPATIENT
Start: 2020-05-18 | End: 2020-05-22 | Stop reason: SDUPTHER

## 2020-05-18 NOTE — TELEPHONE ENCOUNTER
Spoke with patient and he stated that he received a text from his pharmacy stating that the medication was ready to . Patients wife is going to get meds and patient will call us back if there is a problem.

## 2020-05-18 NOTE — TELEPHONE ENCOUNTER
Patient request refill: Norco 7.5/325mg, si-2 po q 4-6 hrs prn pain, #60 written on 20.  PATIENT RAN OUT OF RX LAST NIGHT

## 2020-05-22 ENCOUNTER — OFFICE VISIT (OUTPATIENT)
Dept: ORTHOPEDIC SURGERY | Facility: CLINIC | Age: 55
End: 2020-05-22

## 2020-05-22 VITALS — TEMPERATURE: 97.4 F | HEIGHT: 69 IN | BODY MASS INDEX: 40.43 KG/M2 | WEIGHT: 273 LBS

## 2020-05-22 DIAGNOSIS — Z96.651 STATUS POST RIGHT KNEE REPLACEMENT: Primary | ICD-10-CM

## 2020-05-22 DIAGNOSIS — M17.11 PRIMARY OSTEOARTHRITIS OF RIGHT KNEE: ICD-10-CM

## 2020-05-22 PROCEDURE — 73560 X-RAY EXAM OF KNEE 1 OR 2: CPT | Performed by: ORTHOPAEDIC SURGERY

## 2020-05-22 PROCEDURE — 99024 POSTOP FOLLOW-UP VISIT: CPT | Performed by: ORTHOPAEDIC SURGERY

## 2020-05-22 RX ORDER — HYDROCODONE BITARTRATE AND ACETAMINOPHEN 7.5; 325 MG/1; MG/1
1-2 TABLET ORAL EVERY 4 HOURS PRN
Qty: 20 TABLET | Refills: 0 | Status: SHIPPED | OUTPATIENT
Start: 2020-05-22 | End: 2020-06-02 | Stop reason: SDUPTHER

## 2020-05-22 NOTE — PROGRESS NOTES
Hal Alexandre : 1965 MRN: [id number] DATE: 2020    DIAGNOSIS: 2 week follow up right total knee      SUBJECTIVE:Patient returns today for 2 week follow up of right total knee replacement. Patient reports doing well with no unusual complaints. Appears to be progressing appropriately.    OBJECTIVE:   Exam:. The incision is healing appropriately. No sign of infection. Range of motion is progressing as expected. The calf is soft and nontender with a negative Homans sign.    ASSESSMENT: 2 week status post right knee replacement.    PLAN: 1) Staples removed and steri strips applied   2) Order given for PT   3) Discontinue NIRMALA hose   4) Continue ice PRN   5) aspirin 325 mg orally every day for 1 month   6) Follow up in 6 weeks with repeat Xrays of right knee (3views)    Miko Mclaughlin MD  2020

## 2020-05-27 ENCOUNTER — DOCUMENTATION (OUTPATIENT)
Dept: PHYSICAL THERAPY | Facility: CLINIC | Age: 55
End: 2020-05-27

## 2020-06-02 DIAGNOSIS — M17.11 PRIMARY OSTEOARTHRITIS OF RIGHT KNEE: ICD-10-CM

## 2020-06-02 RX ORDER — HYDROCODONE BITARTRATE AND ACETAMINOPHEN 7.5; 325 MG/1; MG/1
1-2 TABLET ORAL EVERY 4 HOURS PRN
Qty: 20 TABLET | Refills: 0 | Status: SHIPPED | OUTPATIENT
Start: 2020-06-02 | End: 2020-07-10

## 2020-06-02 NOTE — TELEPHONE ENCOUNTER
CHOCO NEEDS A REFILL ON HIS HYDROCODONE 7.5/325, #20 TAKES 1-2 Q6H, SAID HE HAS SPREAD THEM OUT AS FAR AS HE CAN TO MAKE THE 20 LAST BUT HE IS IN A LOT OF PAIN AFTER PT. ON LY HAS 3 LEFT

## 2020-06-03 ENCOUNTER — TELEPHONE (OUTPATIENT)
Dept: ORTHOPEDIC SURGERY | Facility: CLINIC | Age: 55
End: 2020-06-03

## 2020-06-03 NOTE — TELEPHONE ENCOUNTER
"Aniyah with [facility] pharmacy called regarding Norco 7.5-859 script sent yesterday 6/02. Directions stated to take 1-2 tablets every 4-6 hrs. Aniyah stated their \"policy is that they can only fill up to a maximum of # 6 tablets per day\". [facility] pharmacy can be reached at [phone]. Thanks /srh  "

## 2020-06-04 ENCOUNTER — TELEPHONE (OUTPATIENT)
Dept: FAMILY MEDICINE CLINIC | Facility: CLINIC | Age: 55
End: 2020-06-04

## 2020-06-04 NOTE — TELEPHONE ENCOUNTER
Please call the patient and recommend that he take his prescription to a different pharmacy if he has not gotten it filled already.  Unfortunately because of [facility] policy he is going to have issues getting his pain medicine filled.

## 2020-06-04 NOTE — TELEPHONE ENCOUNTER
Sydni from [facility] pharmacy called following up on message left yesterday regarding Norco 7.5-325 & that their policy is to only fill up to a maximum of 6 tablets per day.     Venita called stating they need a verbal update stating the maximum number of tablets for patient to take per day. Venita can be reached at [phone]. Thanks /srh

## 2020-06-04 NOTE — TELEPHONE ENCOUNTER
Have him try otc prevacid for 2 weeks, if it is effective, then no further treatment. If still problems, then make appointment.

## 2020-06-04 NOTE — TELEPHONE ENCOUNTER
Pt wife called wanting to know if you could prescribe pt Protonix because he has been having acid reflux.  Thanks

## 2020-06-05 ENCOUNTER — TELEPHONE (OUTPATIENT)
Dept: ORTHOPEDIC SURGERY | Facility: CLINIC | Age: 55
End: 2020-06-05

## 2020-06-05 NOTE — TELEPHONE ENCOUNTER
Can you please let this patient know he is going to have an ongoing issue with pain med and pharm and he should take it to another pharm

## 2020-06-05 NOTE — TELEPHONE ENCOUNTER
THE PHARMACIST CALLED FROM [facility] ON [facility] HYDROCODONE RX.  7.5/325 1-2 PO Q 4-6 HOURS    THEY HAVE GUIDE LINES WITH PAIN MEDS.  THE DIRECTIONS ARE FOR UP TO 12 A DAY AND THEY WILL NOT FILL THIS WITH THESE DIRECTIONS.   THEY CAN FILL FOR A 3 DAY SUPPLY FOR 18 PILLS, WITH THE DIRECTIONS BEING ADJUSTED.  OR THE PATIENT CAN TAKE IT TO ANOTHER PHARMACY.    PLEASE ADVISE

## 2020-06-16 DIAGNOSIS — E78.49 OTHER HYPERLIPIDEMIA: ICD-10-CM

## 2020-06-17 RX ORDER — ATORVASTATIN CALCIUM 40 MG/1
TABLET, FILM COATED ORAL
Qty: 90 TABLET | Refills: 0 | OUTPATIENT
Start: 2020-06-17

## 2020-06-27 LAB
25(OH)D3+25(OH)D2 SERPL-MCNC: 56.7 NG/ML (ref 30–100)
ALBUMIN SERPL-MCNC: 4.4 G/DL (ref 3.8–4.9)
ALBUMIN/GLOB SERPL: 1.6 {RATIO} (ref 1.2–2.2)
ALP SERPL-CCNC: 127 IU/L (ref 39–117)
ALT SERPL-CCNC: 33 IU/L (ref 0–44)
AST SERPL-CCNC: 20 IU/L (ref 0–40)
BASOPHILS # BLD AUTO: 0.1 X10E3/UL (ref 0–0.2)
BASOPHILS NFR BLD AUTO: 1 %
BILIRUB SERPL-MCNC: 0.3 MG/DL (ref 0–1.2)
BUN SERPL-MCNC: 11 MG/DL (ref 6–24)
BUN/CREAT SERPL: 11 (ref 9–20)
CALCIUM SERPL-MCNC: 10 MG/DL (ref 8.7–10.2)
CHLORIDE SERPL-SCNC: 105 MMOL/L (ref 96–106)
CHOLEST SERPL-MCNC: 139 MG/DL (ref 100–199)
CHOLEST/HDLC SERPL: 3 RATIO (ref 0–5)
CK SERPL-CCNC: 64 U/L (ref 41–331)
CO2 SERPL-SCNC: 24 MMOL/L (ref 20–29)
CREAT SERPL-MCNC: 0.96 MG/DL (ref 0.76–1.27)
EOSINOPHIL # BLD AUTO: 0.2 X10E3/UL (ref 0–0.4)
EOSINOPHIL NFR BLD AUTO: 3 %
ERYTHROCYTE [DISTWIDTH] IN BLOOD BY AUTOMATED COUNT: 12.9 % (ref 11.6–15.4)
GLOBULIN SER CALC-MCNC: 2.8 G/DL (ref 1.5–4.5)
GLUCOSE SERPL-MCNC: 129 MG/DL (ref 65–99)
HBA1C MFR BLD: 6.9 % (ref 4.8–5.6)
HCT VFR BLD AUTO: 43.2 % (ref 37.5–51)
HDLC SERPL-MCNC: 47 MG/DL
HGB BLD-MCNC: 14.1 G/DL (ref 13–17.7)
IMM GRANULOCYTES # BLD AUTO: 0.1 X10E3/UL (ref 0–0.1)
IMM GRANULOCYTES NFR BLD AUTO: 1 %
LDLC SERPL CALC-MCNC: 77 MG/DL (ref 0–99)
LYMPHOCYTES # BLD AUTO: 2.3 X10E3/UL (ref 0.7–3.1)
LYMPHOCYTES NFR BLD AUTO: 31 %
MCH RBC QN AUTO: 27.3 PG (ref 26.6–33)
MCHC RBC AUTO-ENTMCNC: 32.6 G/DL (ref 31.5–35.7)
MCV RBC AUTO: 84 FL (ref 79–97)
MICROALBUMIN UR-MCNC: 48.5 UG/ML
MONOCYTES # BLD AUTO: 0.8 X10E3/UL (ref 0.1–0.9)
MONOCYTES NFR BLD AUTO: 11 %
NEUTROPHILS # BLD AUTO: 3.9 X10E3/UL (ref 1.4–7)
NEUTROPHILS NFR BLD AUTO: 53 %
PLATELET # BLD AUTO: 467 X10E3/UL (ref 150–450)
POTASSIUM SERPL-SCNC: 5 MMOL/L (ref 3.5–5.2)
PROT SERPL-MCNC: 7.2 G/DL (ref 6–8.5)
RBC # BLD AUTO: 5.17 X10E6/UL (ref 4.14–5.8)
SODIUM SERPL-SCNC: 142 MMOL/L (ref 134–144)
TRIGL SERPL-MCNC: 77 MG/DL (ref 0–149)
TSH SERPL DL<=0.005 MIU/L-ACNC: 0.92 UIU/ML (ref 0.45–4.5)
VLDLC SERPL CALC-MCNC: 15 MG/DL (ref 5–40)
WBC # BLD AUTO: 7.4 X10E3/UL (ref 3.4–10.8)

## 2020-07-10 ENCOUNTER — OFFICE VISIT (OUTPATIENT)
Dept: ORTHOPEDIC SURGERY | Facility: CLINIC | Age: 55
End: 2020-07-10

## 2020-07-10 VITALS — BODY MASS INDEX: 39.99 KG/M2 | HEIGHT: 69 IN | WEIGHT: 270 LBS | TEMPERATURE: 96.8 F

## 2020-07-10 DIAGNOSIS — Z96.651 STATUS POST TOTAL RIGHT KNEE REPLACEMENT: Primary | ICD-10-CM

## 2020-07-10 PROCEDURE — 99024 POSTOP FOLLOW-UP VISIT: CPT | Performed by: NURSE PRACTITIONER

## 2020-07-10 PROCEDURE — 73562 X-RAY EXAM OF KNEE 3: CPT | Performed by: NURSE PRACTITIONER

## 2020-07-10 NOTE — PROGRESS NOTES
Hal Alexandre : 1965 MRN: [id number] DATE: 7/10/2020    DIAGNOSIS: 8 week follow up right total knee      SUBJECTIVE:Patient returns today for 8 week follow up of right total knee replacement. Patient reports doing well with no unusual complaints. Appears to be progressing appropriately.    OBJECTIVE:   Exam:. The incision is well healed. No sign of infection. Range of motion is measured at 0 to 120. The calf is soft and nontender with a negative Homans sign. Strength is progressing and the patient is ambulating appropriately.    DIAGNOSTIC STUDIES  Xrays: 3 views of the right knee (AP, lateral, and sunrise) were ordered and reviewed for evaluation of recent knee replacement. They demonstrate a well positioned, well aligned knee replacement without complicating factors noted. In comparison with previous films there has been no change.    ASSESSMENT: 8 week status post right knee replacement.    PLAN: 1) Continue with PT exercises as prescribed   2) Follow up in 10 months    Bhumi Mcknight, SOFIA  7/10/2020

## 2020-07-26 DIAGNOSIS — E78.49 OTHER HYPERLIPIDEMIA: ICD-10-CM

## 2020-07-26 DIAGNOSIS — E03.9 ACQUIRED HYPOTHYROIDISM: ICD-10-CM

## 2020-07-26 DIAGNOSIS — I10 ESSENTIAL HYPERTENSION: ICD-10-CM

## 2020-07-26 DIAGNOSIS — E11.65 TYPE 2 DIABETES MELLITUS WITH HYPERGLYCEMIA, WITHOUT LONG-TERM CURRENT USE OF INSULIN (HCC): ICD-10-CM

## 2020-07-27 RX ORDER — AMLODIPINE BESYLATE 10 MG/1
TABLET ORAL
Qty: 30 TABLET | Refills: 0 | Status: SHIPPED | OUTPATIENT
Start: 2020-07-27 | End: 2020-09-09 | Stop reason: SDUPTHER

## 2020-07-27 RX ORDER — PIOGLITAZONEHYDROCHLORIDE 30 MG/1
TABLET ORAL
Qty: 90 TABLET | Refills: 0 | Status: SHIPPED | OUTPATIENT
Start: 2020-07-27 | End: 2020-09-09 | Stop reason: SDUPTHER

## 2020-07-27 RX ORDER — METFORMIN HYDROCHLORIDE 500 MG/1
TABLET, EXTENDED RELEASE ORAL
Qty: 360 TABLET | Refills: 0 | Status: SHIPPED | OUTPATIENT
Start: 2020-07-27 | End: 2020-09-09 | Stop reason: SDUPTHER

## 2020-07-27 RX ORDER — IRBESARTAN 300 MG/1
TABLET ORAL
Qty: 90 TABLET | Refills: 0 | Status: SHIPPED | OUTPATIENT
Start: 2020-07-27 | End: 2020-09-09 | Stop reason: SDUPTHER

## 2020-07-27 RX ORDER — LEVOTHYROXINE SODIUM 175 UG/1
TABLET ORAL
Qty: 90 TABLET | Refills: 0 | Status: SHIPPED | OUTPATIENT
Start: 2020-07-27 | End: 2020-08-27

## 2020-07-27 RX ORDER — EZETIMIBE 10 MG/1
TABLET ORAL
Qty: 90 TABLET | Refills: 0 | Status: SHIPPED | OUTPATIENT
Start: 2020-07-27 | End: 2020-09-09 | Stop reason: SDUPTHER

## 2020-08-04 DIAGNOSIS — E55.9 VITAMIN D DEFICIENCY: ICD-10-CM

## 2020-08-04 DIAGNOSIS — E78.49 OTHER HYPERLIPIDEMIA: ICD-10-CM

## 2020-08-05 RX ORDER — ATORVASTATIN CALCIUM 40 MG/1
TABLET, FILM COATED ORAL
Qty: 90 TABLET | Refills: 0 | OUTPATIENT
Start: 2020-08-05

## 2020-08-05 RX ORDER — ERGOCALCIFEROL 1.25 MG/1
CAPSULE ORAL
Qty: 12 CAPSULE | Refills: 0 | OUTPATIENT
Start: 2020-08-05

## 2020-08-05 NOTE — TELEPHONE ENCOUNTER
Per progress note, he needs follow up appointment. Was supposed to be annual wellness and med refill visit. Ok to give him 30 day supply if he is totally out of medications.

## 2020-08-26 DIAGNOSIS — E55.9 VITAMIN D DEFICIENCY: ICD-10-CM

## 2020-08-26 DIAGNOSIS — E03.9 ACQUIRED HYPOTHYROIDISM: ICD-10-CM

## 2020-08-27 DIAGNOSIS — I10 ESSENTIAL HYPERTENSION: ICD-10-CM

## 2020-08-27 RX ORDER — LEVOTHYROXINE SODIUM 175 UG/1
TABLET ORAL
Qty: 90 TABLET | Refills: 0 | Status: SHIPPED | OUTPATIENT
Start: 2020-08-27 | End: 2020-09-09 | Stop reason: SDUPTHER

## 2020-08-27 RX ORDER — ERGOCALCIFEROL 1.25 MG/1
CAPSULE ORAL
Qty: 12 CAPSULE | Refills: 0 | Status: SHIPPED | OUTPATIENT
Start: 2020-08-27 | End: 2020-09-09 | Stop reason: SDUPTHER

## 2020-08-28 RX ORDER — AMLODIPINE BESYLATE 10 MG/1
TABLET ORAL
Qty: 30 TABLET | Refills: 0 | OUTPATIENT
Start: 2020-08-28 | End: 2020-09-27

## 2020-09-09 ENCOUNTER — OFFICE VISIT (OUTPATIENT)
Dept: FAMILY MEDICINE CLINIC | Facility: CLINIC | Age: 55
End: 2020-09-09

## 2020-09-09 VITALS
DIASTOLIC BLOOD PRESSURE: 90 MMHG | TEMPERATURE: 96.6 F | SYSTOLIC BLOOD PRESSURE: 149 MMHG | OXYGEN SATURATION: 96 % | HEART RATE: 91 BPM | WEIGHT: 275 LBS | HEIGHT: 69 IN | BODY MASS INDEX: 40.73 KG/M2 | RESPIRATION RATE: 16 BRPM

## 2020-09-09 DIAGNOSIS — E03.9 ACQUIRED HYPOTHYROIDISM: ICD-10-CM

## 2020-09-09 DIAGNOSIS — R35.1 BPH ASSOCIATED WITH NOCTURIA: ICD-10-CM

## 2020-09-09 DIAGNOSIS — E55.9 VITAMIN D DEFICIENCY: ICD-10-CM

## 2020-09-09 DIAGNOSIS — N52.9 VASCULOGENIC ERECTILE DYSFUNCTION, UNSPECIFIED VASCULOGENIC ERECTILE DYSFUNCTION TYPE: ICD-10-CM

## 2020-09-09 DIAGNOSIS — N40.1 BPH ASSOCIATED WITH NOCTURIA: ICD-10-CM

## 2020-09-09 DIAGNOSIS — I10 ESSENTIAL HYPERTENSION: Primary | ICD-10-CM

## 2020-09-09 DIAGNOSIS — E78.49 OTHER HYPERLIPIDEMIA: ICD-10-CM

## 2020-09-09 DIAGNOSIS — E11.65 TYPE 2 DIABETES MELLITUS WITH HYPERGLYCEMIA, WITHOUT LONG-TERM CURRENT USE OF INSULIN (HCC): ICD-10-CM

## 2020-09-09 PROCEDURE — 99214 OFFICE O/P EST MOD 30 MIN: CPT | Performed by: FAMILY MEDICINE

## 2020-09-09 RX ORDER — METFORMIN HYDROCHLORIDE 500 MG/1
2000 TABLET, EXTENDED RELEASE ORAL
Qty: 360 TABLET | Refills: 2 | Status: SHIPPED | OUTPATIENT
Start: 2020-09-09 | End: 2021-07-07 | Stop reason: SDUPTHER

## 2020-09-09 RX ORDER — PIOGLITAZONEHYDROCHLORIDE 30 MG/1
30 TABLET ORAL DAILY
Qty: 90 TABLET | Refills: 2 | Status: SHIPPED | OUTPATIENT
Start: 2020-09-09 | End: 2021-07-07 | Stop reason: SDUPTHER

## 2020-09-09 RX ORDER — LEVOTHYROXINE SODIUM 175 UG/1
175 TABLET ORAL DAILY
Qty: 90 TABLET | Refills: 2 | Status: SHIPPED | OUTPATIENT
Start: 2020-09-09 | End: 2021-07-07 | Stop reason: SDUPTHER

## 2020-09-09 RX ORDER — AMLODIPINE BESYLATE 10 MG/1
10 TABLET ORAL DAILY
Qty: 90 TABLET | Refills: 2 | Status: SHIPPED | OUTPATIENT
Start: 2020-09-09 | End: 2021-06-14

## 2020-09-09 RX ORDER — ERGOCALCIFEROL 1.25 MG/1
50000 CAPSULE ORAL
Qty: 12 CAPSULE | Refills: 2 | Status: SHIPPED | OUTPATIENT
Start: 2020-09-09 | End: 2021-07-07 | Stop reason: SDUPTHER

## 2020-09-09 RX ORDER — EZETIMIBE 10 MG/1
10 TABLET ORAL DAILY
Qty: 90 TABLET | Refills: 2 | Status: SHIPPED | OUTPATIENT
Start: 2020-09-09 | End: 2021-07-07 | Stop reason: SDUPTHER

## 2020-09-09 RX ORDER — ATORVASTATIN CALCIUM 40 MG/1
40 TABLET, FILM COATED ORAL DAILY
Qty: 90 TABLET | Refills: 2 | Status: SHIPPED | OUTPATIENT
Start: 2020-09-09 | End: 2021-07-07 | Stop reason: SDUPTHER

## 2020-09-09 RX ORDER — TADALAFIL 5 MG/1
5 TABLET ORAL DAILY
Qty: 90 TABLET | Refills: 2 | Status: SHIPPED | OUTPATIENT
Start: 2020-09-09 | End: 2021-07-07 | Stop reason: SDUPTHER

## 2020-09-09 RX ORDER — IRBESARTAN 300 MG/1
300 TABLET ORAL DAILY
Qty: 90 TABLET | Refills: 2 | Status: SHIPPED | OUTPATIENT
Start: 2020-09-09 | End: 2021-07-07 | Stop reason: SDUPTHER

## 2020-09-09 NOTE — PROGRESS NOTES
"   Subjective       Chief Complaint   Patient presents with   • Hypertension     6 mo          HPI:       This patient presents for medication refill.  Blood pressure is above goal but he has run out of amlodipine over the past 5 to 6 days.  Type 2 diabetes stable.  Thyroid stable.  Lipids are controlled.  He would like prescription for ED symptoms and nocturia symptoms.  Overall he has felt well.  History of Present Illness       Review of Systems   Constitutional: Negative for fatigue.   Cardiovascular: Negative for chest pain.      I have reviewed the ROS as documented above. Elliott Lopez MD              PE:   Objective   /90   Pulse 91   Temp 96.6 °F (35.9 °C) (Tympanic)   Resp 16   Ht 175.3 cm (69\")   Wt 125 kg (275 lb)   SpO2 96%   BMI 40.61 kg/m²  Body mass index is 40.61 kg/m².    Physical Exam   Constitutional: He is oriented to person, place, and time. He appears well-developed. No distress.   Eyes: Conjunctivae and lids are normal.   Neck: Carotid bruit is not present.   Cardiovascular: Normal rate, regular rhythm and normal heart sounds.   Pulmonary/Chest: Effort normal and breath sounds normal.   Neurological: He is alert and oriented to person, place, and time.   Skin: Skin is warm and dry.   Psychiatric: He has a normal mood and affect. His behavior is normal.   Vitals reviewed.      Procedures      Data Reviewed:                  A/P:     Assessment/Plan   Diagnoses and all orders for this visit:    1. Essential hypertension (Primary)  Assessment & Plan:  Hypertension is worsening.  Continue current treatment regimen. restart amlodipine  Blood pressure will be reassessed at the next regular appointment.    Orders:  -     amLODIPine (NORVASC) 10 MG tablet; Take 1 tablet by mouth Daily for 270 days.  Dispense: 90 tablet; Refill: 2  -     irbesartan (AVAPRO) 300 MG tablet; Take 1 tablet by mouth Daily for 270 days.  Dispense: 90 tablet; Refill: 2  -     Comprehensive Metabolic Panel; " Future  -     CBC & Differential; Future    2. Other hyperlipidemia  Assessment & Plan:  Lipid abnormalities are unchanged.  Nutritional counseling was provided. and Pharmacotherapy as ordered.  Lipids will be reassessed in 6 months.    Orders:  -     atorvastatin (LIPITOR) 40 MG tablet; Take 1 tablet by mouth Daily for 270 days.  Dispense: 90 tablet; Refill: 2  -     ezetimibe (ZETIA) 10 MG tablet; Take 1 tablet by mouth Daily for 270 days.  Dispense: 90 tablet; Refill: 2  -     Lipid Panel With / Chol / HDL Ratio; Future  -     CK; Future    3. Acquired hypothyroidism  Assessment & Plan:  The current medical regimen is effective;  continue present plan and medications.      Orders:  -     levothyroxine (Euthyrox) 175 MCG tablet; Take 1 tablet by mouth Daily for 270 days.  Dispense: 90 tablet; Refill: 2  -     TSH; Future    4. Type 2 diabetes mellitus with hyperglycemia, without long-term current use of insulin (CMS/[facility])  Assessment & Plan:  Diabetes is unchanged.   Continue current treatment regimen.  Diabetes will be reassessed in 6 months.    Orders:  -     metFORMIN ER (GLUCOPHAGE-XR) 500 MG 24 hr tablet; Take 4 tablets by mouth Daily With Breakfast for 270 days.  Dispense: 360 tablet; Refill: 2  -     pioglitazone (ACTOS) 30 MG tablet; Take 1 tablet by mouth Daily for 270 days.  Dispense: 90 tablet; Refill: 2  -     Comprehensive Metabolic Panel; Future  -     Hemoglobin A1c; Future  -     MicroAlbumin, Urine, Random - Urine, Clean Catch; Future    5. Vitamin D deficiency  -     vitamin D (ERGOCALCIFEROL) 1.25 MG (17758 UT) capsule capsule; Take 1 capsule by mouth Every 7 (Seven) Days for 270 days.  Dispense: 12 capsule; Refill: 2  -     Vitamin D 25 Hydroxy; Future    6. BPH associated with nocturia  Assessment & Plan:  Nocturia symptoms are stable. PSA will be monitored with annual labs.   Start Cialis    Orders:  -     tadalafil (Cialis) 5 MG tablet; Take 1 tablet by mouth Daily for 270 days.  Dispense: 90  tablet; Refill: 2  -     PSA DIAGNOSTIC; Future    7. Vasculogenic erectile dysfunction, unspecified vasculogenic erectile dysfunction type  Assessment & Plan:  New problem.  Start Cialis daily.    Orders:  -     tadalafil (Cialis) 5 MG tablet; Take 1 tablet by mouth Daily for 270 days.  Dispense: 90 tablet; Refill: 2  -     PSA DIAGNOSTIC; Future        Follow up:    Return in about 6 months (around 3/9/2021).

## 2020-09-09 NOTE — PATIENT INSTRUCTIONS

## 2020-09-09 NOTE — ASSESSMENT & PLAN NOTE
Hypertension is worsening.  Continue current treatment regimen. restart amlodipine  Blood pressure will be reassessed at the next regular appointment.

## 2020-10-03 ENCOUNTER — FLU SHOT (OUTPATIENT)
Dept: FAMILY MEDICINE CLINIC | Facility: CLINIC | Age: 55
End: 2020-10-03

## 2020-10-03 DIAGNOSIS — Z23 NEED FOR INFLUENZA VACCINATION: ICD-10-CM

## 2020-10-03 PROCEDURE — 90471 IMMUNIZATION ADMIN: CPT | Performed by: NURSE PRACTITIONER

## 2020-10-03 PROCEDURE — 90686 IIV4 VACC NO PRSV 0.5 ML IM: CPT | Performed by: NURSE PRACTITIONER

## 2021-03-26 ENCOUNTER — BULK ORDERING (OUTPATIENT)
Dept: CASE MANAGEMENT | Facility: OTHER | Age: 56
End: 2021-03-26

## 2021-03-26 DIAGNOSIS — Z23 IMMUNIZATION DUE: ICD-10-CM

## 2021-06-14 DIAGNOSIS — I10 ESSENTIAL HYPERTENSION: ICD-10-CM

## 2021-06-14 RX ORDER — AMLODIPINE BESYLATE 10 MG/1
TABLET ORAL
Qty: 30 TABLET | Refills: 0 | Status: SHIPPED | OUTPATIENT
Start: 2021-06-14 | End: 2021-06-30

## 2021-06-16 ENCOUNTER — TELEPHONE (OUTPATIENT)
Dept: FAMILY MEDICINE CLINIC | Facility: CLINIC | Age: 56
End: 2021-06-16

## 2021-06-16 NOTE — TELEPHONE ENCOUNTER
Caller: Sarah Sauceda    Relationship: Emergency Contact    Best call back number:     What orders are you requesting (i.e. lab or imaging): LABS    In what timeframe would the patient need to come in:     Where will you receive your lab/imaging services: OFFICE LAB    Additional notes: PATIENTS WIFE SARAH IS CALLING IN STATING THAT PATIENT IS BEHIND ON LABS SO HE WILL NEED ORDERS PUT IN. PATIENT WANTS TO BE CALLED WHEN THE ORDERS ARE READY SO THAT HE CAN SCHEDULE A LAB APPOINTMENT.

## 2021-06-29 DIAGNOSIS — I10 ESSENTIAL HYPERTENSION: ICD-10-CM

## 2021-06-30 RX ORDER — AMLODIPINE BESYLATE 10 MG/1
TABLET ORAL
Qty: 15 TABLET | Refills: 0 | Status: SHIPPED | OUTPATIENT
Start: 2021-06-30 | End: 2021-07-07 | Stop reason: SDUPTHER

## 2021-07-07 ENCOUNTER — OFFICE VISIT (OUTPATIENT)
Dept: FAMILY MEDICINE CLINIC | Facility: CLINIC | Age: 56
End: 2021-07-07

## 2021-07-07 VITALS
SYSTOLIC BLOOD PRESSURE: 148 MMHG | HEIGHT: 69 IN | BODY MASS INDEX: 42.21 KG/M2 | TEMPERATURE: 98 F | DIASTOLIC BLOOD PRESSURE: 82 MMHG | OXYGEN SATURATION: 96 % | HEART RATE: 87 BPM | RESPIRATION RATE: 16 BRPM | WEIGHT: 285 LBS

## 2021-07-07 DIAGNOSIS — E78.49 OTHER HYPERLIPIDEMIA: ICD-10-CM

## 2021-07-07 DIAGNOSIS — N40.1 BPH ASSOCIATED WITH NOCTURIA: ICD-10-CM

## 2021-07-07 DIAGNOSIS — E55.9 VITAMIN D DEFICIENCY: ICD-10-CM

## 2021-07-07 DIAGNOSIS — E11.65 TYPE 2 DIABETES MELLITUS WITH HYPERGLYCEMIA, WITHOUT LONG-TERM CURRENT USE OF INSULIN (HCC): ICD-10-CM

## 2021-07-07 DIAGNOSIS — E03.9 ACQUIRED HYPOTHYROIDISM: ICD-10-CM

## 2021-07-07 DIAGNOSIS — R35.1 BPH ASSOCIATED WITH NOCTURIA: ICD-10-CM

## 2021-07-07 DIAGNOSIS — I10 ESSENTIAL HYPERTENSION: Primary | ICD-10-CM

## 2021-07-07 DIAGNOSIS — N52.9 VASCULOGENIC ERECTILE DYSFUNCTION, UNSPECIFIED VASCULOGENIC ERECTILE DYSFUNCTION TYPE: ICD-10-CM

## 2021-07-07 PROCEDURE — 99214 OFFICE O/P EST MOD 30 MIN: CPT | Performed by: FAMILY MEDICINE

## 2021-07-07 RX ORDER — METFORMIN HYDROCHLORIDE 500 MG/1
2000 TABLET, EXTENDED RELEASE ORAL
Qty: 360 TABLET | Refills: 1 | Status: SHIPPED | OUTPATIENT
Start: 2021-07-07 | End: 2022-01-12 | Stop reason: SDUPTHER

## 2021-07-07 RX ORDER — LEVOTHYROXINE SODIUM 175 UG/1
175 TABLET ORAL DAILY
Qty: 90 TABLET | Refills: 1 | Status: SHIPPED | OUTPATIENT
Start: 2021-07-07 | End: 2022-02-03 | Stop reason: SDUPTHER

## 2021-07-07 RX ORDER — TADALAFIL 5 MG/1
5 TABLET ORAL DAILY
Qty: 90 TABLET | Refills: 1 | Status: SHIPPED | OUTPATIENT
Start: 2021-07-07 | End: 2022-02-03 | Stop reason: SDUPTHER

## 2021-07-07 RX ORDER — ATORVASTATIN CALCIUM 40 MG/1
40 TABLET, FILM COATED ORAL DAILY
Qty: 90 TABLET | Refills: 1 | Status: SHIPPED | OUTPATIENT
Start: 2021-07-07 | End: 2022-01-12 | Stop reason: SDUPTHER

## 2021-07-07 RX ORDER — PIOGLITAZONEHYDROCHLORIDE 30 MG/1
30 TABLET ORAL DAILY
Qty: 90 TABLET | Refills: 1 | Status: SHIPPED | OUTPATIENT
Start: 2021-07-07 | End: 2022-02-03 | Stop reason: SDUPTHER

## 2021-07-07 RX ORDER — AMLODIPINE BESYLATE 10 MG/1
10 TABLET ORAL DAILY
Qty: 90 TABLET | Refills: 1 | Status: SHIPPED | OUTPATIENT
Start: 2021-07-07 | End: 2022-01-12 | Stop reason: SDUPTHER

## 2021-07-07 RX ORDER — EZETIMIBE 10 MG/1
10 TABLET ORAL DAILY
Qty: 90 TABLET | Refills: 1 | Status: SHIPPED | OUTPATIENT
Start: 2021-07-07 | End: 2022-02-03 | Stop reason: SDUPTHER

## 2021-07-07 RX ORDER — IRBESARTAN 300 MG/1
300 TABLET ORAL DAILY
Qty: 90 TABLET | Refills: 1 | Status: SHIPPED | OUTPATIENT
Start: 2021-07-07 | End: 2022-02-03 | Stop reason: SDUPTHER

## 2021-07-07 RX ORDER — ERGOCALCIFEROL 1.25 MG/1
50000 CAPSULE ORAL
Qty: 12 CAPSULE | Refills: 2 | Status: SHIPPED | OUTPATIENT
Start: 2021-07-07 | End: 2022-02-03

## 2021-07-07 RX ORDER — ORAL SEMAGLUTIDE 3 MG/1
3 TABLET ORAL
Qty: 90 TABLET | Refills: 1 | Status: SHIPPED | OUTPATIENT
Start: 2021-07-07 | End: 2021-08-31 | Stop reason: ALTCHOICE

## 2021-07-07 NOTE — ASSESSMENT & PLAN NOTE
Diabetes is worsening.   Dietary recommendations for ADA diet.  Regular aerobic exercise.  Medication changes per orders.  Start Rybelsus 3 mg every morning.  Patient understands to take it on empty stomach with about 4 ounces of water each morning.  Diabetes will be reassessed in 3 months.

## 2021-07-07 NOTE — ASSESSMENT & PLAN NOTE
Blood pressure not fully controlled.  Will continue to monitor.  DASH diet shared in AVS.  Weight loss encouraged.  Routine exercise encouraged.

## 2021-07-07 NOTE — PROGRESS NOTES
"Chief Complaint  Hypertension (med refill)    Subjective          Rudolph presents to [facility] to refill medicines.  Since last visit his blood sugar control has worsened.  He is now not to goal.  No complaints of medicine side effects.  Labs reviewed and are seen below.  Some daytime fatigue.          Objective   Vital Signs:   Vitals:    07/07/21 1559   BP: 148/82   Pulse: 87   Resp: 16   Temp: 98 °F (36.7 °C)   TempSrc: Oral   SpO2: 96%   Weight: 129 kg (285 lb)   Height: 175.3 cm (69\")        Physical Exam  Vitals reviewed.   Constitutional:       General: He is not in acute distress.     Appearance: He is obese.   Eyes:      General: Lids are normal.      Conjunctiva/sclera: Conjunctivae normal.   Neck:      Vascular: No carotid bruit.      Trachea: No tracheal deviation.   Cardiovascular:      Rate and Rhythm: Normal rate and regular rhythm.      Heart sounds: Normal heart sounds. No murmur heard.     Pulmonary:      Effort: Pulmonary effort is normal.      Breath sounds: Normal breath sounds.   Skin:     General: Skin is warm and dry.   Neurological:      Mental Status: He is alert. He is not disoriented.   Psychiatric:         Speech: Speech normal.         Behavior: Behavior normal. Behavior is cooperative.          Result Review :     The following data was reviewed by: Elliott Lopez MD on 07/07/2021:  MicroAlbumin, Urine, Random - Urine, Clean Catch (06/25/2021 08:11)  Hemoglobin A1c (06/25/2021 08:11)  Vitamin D 25 Hydroxy (06/25/2021 08:11)  PSA DIAGNOSTIC (06/25/2021 08:11)  TSH (06/25/2021 08:11)  CK (06/25/2021 08:11)  Lipid Panel With / Chol / HDL Ratio (06/25/2021 08:11)  CBC & Differential (06/25/2021 08:11)  Comprehensive Metabolic Panel (06/25/2021 08:11)           Assessment and Plan    Diagnoses and all orders for this visit:    1. Essential hypertension (Primary)  Assessment & Plan:  Blood pressure not fully controlled.  Will continue to monitor.  DASH diet " shared in AVS.  Weight loss encouraged.  Routine exercise encouraged.    Orders:  -     amLODIPine (NORVASC) 10 MG tablet; Take 1 tablet by mouth Daily for 180 days.  Dispense: 90 tablet; Refill: 1  -     irbesartan (AVAPRO) 300 MG tablet; Take 1 tablet by mouth Daily for 180 days.  Dispense: 90 tablet; Refill: 1  -     Comprehensive Metabolic Panel; Future  -     CBC & Differential; Future    2. Other hyperlipidemia  Assessment & Plan:  Recheck labs 3 months.  Continue same therapy.    Orders:  -     atorvastatin (LIPITOR) 40 MG tablet; Take 1 tablet by mouth Daily for 180 days.  Dispense: 90 tablet; Refill: 1  -     ezetimibe (ZETIA) 10 MG tablet; Take 1 tablet by mouth Daily for 180 days.  Dispense: 90 tablet; Refill: 1  -     Lipid Panel With / Chol / HDL Ratio; Future  -     CK; Future    3. Acquired hypothyroidism  Assessment & Plan:  Condition is stable. The current medical regimen is effective;  continue present plan and medications.    Orders:  -     levothyroxine (Euthyrox) 175 MCG tablet; Take 1 tablet by mouth Daily for 180 days.  Dispense: 90 tablet; Refill: 1  -     TSH; Future    4. Type 2 diabetes mellitus with hyperglycemia, without long-term current use of insulin (CMS/[facility])  Assessment & Plan:  Diabetes is worsening.   Dietary recommendations for ADA diet.  Regular aerobic exercise.  Medication changes per orders.  Start Rybelsus 3 mg every morning.  Patient understands to take it on empty stomach with about 4 ounces of water each morning.  Diabetes will be reassessed in 3 months.    Orders:  -     metFORMIN ER (GLUCOPHAGE-XR) 500 MG 24 hr tablet; Take 4 tablets by mouth Daily With Breakfast for 180 days.  Dispense: 360 tablet; Refill: 1  -     pioglitazone (ACTOS) 30 MG tablet; Take 1 tablet by mouth Daily for 180 days.  Dispense: 90 tablet; Refill: 1  -     Rybelsus 3 MG tablet; Take 3 mg by mouth Every Morning Before Breakfast for 180 days.  Dispense: 90 tablet; Refill: 1  -     Hemoglobin A1c;  Future  -     MicroAlbumin, Urine, Random - Urine, Clean Catch; Future    5. BPH associated with nocturia  Assessment & Plan:  Nocturia symptoms are stable. PSA will be monitored with annual labs.     Orders:  -     tadalafil (Cialis) 5 MG tablet; Take 1 tablet by mouth Daily for 180 days.  Dispense: 90 tablet; Refill: 1    6. Vasculogenic erectile dysfunction, unspecified vasculogenic erectile dysfunction type  Assessment & Plan:  Condition is stable. The current medical regimen is effective;  continue present plan and medications.    Orders:  -     tadalafil (Cialis) 5 MG tablet; Take 1 tablet by mouth Daily for 180 days.  Dispense: 90 tablet; Refill: 1    7. Vitamin D deficiency  -     vitamin D (ERGOCALCIFEROL) 1.25 MG (81543 UT) capsule capsule; Take 1 capsule by mouth Every 7 (Seven) Days for 270 days.  Dispense: 12 capsule; Refill: 2  -     Vitamin D 25 Hydroxy; Future      Follow Up   Return in about 3 months (around 10/7/2021) for recheck/refill medication.  Patient was given instructions and counseling regarding his condition or for health maintenance advice. Please see specific information pulled into the AVS if appropriate.

## 2021-07-07 NOTE — PATIENT INSTRUCTIONS
Rybelsus=semaglutide oral tablets  What is this medicine?  SEMAGLUTIDE (Facundo a GLOO tide) is used to improve blood sugar control in adults with type 2 diabetes. This medicine may be used with other diabetes medicines.  This medicine may be used for other purposes; ask your health care provider or pharmacist if you have questions.  COMMON BRAND NAME(S): Rybelsus  What should I tell my health care provider before I take this medicine?  They need to know if you have any of these conditions:  · endocrine tumors (MEN 2) or if someone in your family had these tumors  · eye disease, vision problems  · history of pancreatitis  · kidney disease  · stomach problems  · thyroid cancer or if someone in your family had thyroid cancer  · an unusual or allergic reaction to semaglutide, other medicines, foods, dyes, or preservatives  · pregnant or trying to get pregnant  · breast-feeding  How should I use this medicine?  Take this medicine by mouth with a glass of plain water that is less than 4 ounces (less than 120 mL). Follow the directions on the prescription label. Do not cut, crush or chew this medicine. Swallow the tablets whole. Take at least 30 minutes before the first food, other beverage, or other oral medications of the day. Take your medicine at regular intervals. Do not take your medicine more often than directed. Do not stop taking except on your doctor's advice.  A special MedGuide will be given to you by the pharmacist with each prescription and refill. Be sure to read this information carefully each time.  Talk to your pediatrician regarding the use of this medicine in children. Special care may be needed.  Overdosage: If you think you have taken too much of this medicine contact a poison control center or emergency room at once.  NOTE: This medicine is only for you. Do not share this medicine with others.  What if I miss a dose?  If you miss a dose, skip it. Take your next dose at the normal time. Do not take extra  or 2 doses at the same time to make up for the missed dose.  What may interact with this medicine?  What may interact with this medicine?  · aminophylline  · carbamazepine  · cyclosporine  · digoxin  · levothyroxine  · other medicines for diabetes  · phenytoin  · tacrolimus  · theophylline  · warfarin  Many medications may cause changes in blood sugar, these include:  · alcohol containing beverages  · antiviral medicines for HIV or AIDS  · aspirin and aspirin-like drugs  · certain medicines for blood pressure, heart disease, irregular heart beat  · chromium  · diuretics  · female hormones, such as estrogens or progestins, birth control pills  · fenofibrate  · gemfibrozil  · isoniazid  · lanreotide  · male hormones or anabolic steroids  · MAOIs like Carbex, Eldepryl, Marplan, Nardil, and Parnate  · medicines for weight loss  · medicines for allergies, asthma, cold, or cough  · medicines for depression, anxiety, or psychotic disturbances  · niacin  · nicotine  · NSAIDs, medicines for pain and inflammation, like ibuprofen or naproxen  · octreotide  · pasireotide  · pentamidine  · phenytoin  · probenecid  · quinolone antibiotics such as ciprofloxacin, levofloxacin, ofloxacin  · some herbal dietary supplements  · steroid medicines such as prednisone or cortisone  · sulfamethoxazole; trimethoprim  · thyroid hormones  Some medications can hide the warning symptoms of low blood sugar (hypoglycemia). You may need to monitor your blood sugar more closely if you are taking one of these medications. These include:  · beta-blockers, often used for high blood pressure or heart problems (examples include atenolol, metoprolol, propranolol)  · clonidine  · guanethidine  · reserpine  This list may not describe all possible interactions. Give your health care provider a list of all the medicines, herbs, non-prescription drugs, or dietary supplements you use. Also tell them if you smoke, drink alcohol, or use illegal drugs. Some items  may interact with your medicine.  What should I watch for while using this medicine?  Visit your doctor or health care professional for regular checks on your progress.  Drink plenty of fluids while taking this medicine. Check with your doctor or health care professional if you get an attack of severe diarrhea, nausea, and vomiting. The loss of too much body fluid can make it dangerous for you to take this medicine.  A test called the HbA1C (A1C) will be monitored. This is a simple blood test. It measures your blood sugar control over the last 2 to 3 months. You will receive this test every 3 to 6 months.  Learn how to check your blood sugar. Learn the symptoms of low and high blood sugar and how to manage them.  Always carry a quick-source of sugar with you in case you have symptoms of low blood sugar. Examples include hard sugar candy or glucose tablets. Make sure others know that you can choke if you eat or drink when you develop serious symptoms of low blood sugar, such as seizures or unconsciousness. They must get medical help at once.  Tell your doctor or health care professional if you have high blood sugar. You might need to change the dose of your medicine. If you are sick or exercising more than usual, you might need to change the dose of your medicine.  Do not skip meals. Ask your doctor or health care professional if you should avoid alcohol. Many nonprescription cough and cold products contain sugar or alcohol. These can affect blood sugar.  Wear a medical ID bracelet or chain, and carry a card that describes your disease and details of your medicine and dosage times.  Do not become pregnant while taking this medicine. Women should inform their doctor if they wish to become pregnant or think they might be pregnant. There is a potential for serious side effects to an unborn child. Talk to your health care professional or pharmacist for more information. Do not breast-feed an infant while taking this  "medicine.  What side effects may I notice from receiving this medicine?  Side effects that you should report to your doctor or health care professional as soon as possible:  · allergic reactions like skin rash, itching or hives, swelling of the face, lips, or tongue  · breathing problems  · changes in vision  · diarrhea that continues or is severe  · lump or swelling on the neck  · severe nausea  · signs and symptoms of infection like fever or chills; cough; sore throat; pain or trouble passing urine  · signs and symptoms of low blood sugar such as feeling anxious, confusion, dizziness, increased hunger, unusually weak or tired, sweating, shakiness, cold, irritable, headache, blurred vision, fast heartbeat, loss of consciousness  · signs and symptoms of kidney injury like trouble passing urine or change in the amount of urine  · trouble swallowing  · unusual stomach upset or pain  · vomiting  Side effects that usually do not require medical attention (report these to your doctor or health care professional if they continue or are bothersome):  · constipation  · diarrhea  · nausea  · stomach upset  This list may not describe all possible side effects. Call your doctor for medical advice about side effects. You may report side effects to FDA at 5-578-FDA-6424.  Where should I keep my medicine?  Keep out of the reach of children.  Store at room temperature between 15 and 30 degrees C (59 and 86 degrees F). Keep this medicine in the original blister card until use. Keep in a dry place. Throw away any unused medicine after the expiration date.  NOTE: This sheet is a summary. It may not cover all possible information. If you have questions about this medicine, talk to your doctor, pharmacist, or health care provider.  © 2021 [employer] (2019-09-24 10:07:43)      https://www.diabeteseducator.org/docs/default-source/living-with-diabetes/conquering-the-grocery-store-v1.pdf?sfvrsn=4\">   Carbohydrate Counting for " Diabetes Mellitus, Adult  Carbohydrate counting is a method of keeping track of how many carbohydrates you eat. Eating carbohydrates naturally increases the amount of sugar (glucose) in the blood. Counting how many carbohydrates you eat improves your blood glucose control, which helps you manage your diabetes.  It is important to know how many carbohydrates you can safely have in each meal. This is different for every person. A dietitian can help you make a meal plan and calculate how many carbohydrates you should have at each meal and snack.  What foods contain carbohydrates?  Carbohydrates are found in the following foods:  · Grains, such as breads and cereals.  · Dried beans and soy products.  · Starchy vegetables, such as potatoes, peas, and corn.  · Fruit and fruit juices.  · Milk and yogurt.  · Sweets and snack foods, such as cake, cookies, candy, chips, and soft drinks.  How do I count carbohydrates in foods?  There are two ways to count carbohydrates in food. You can read food labels or learn standard serving sizes of foods. You can use either of the methods or a combination of both.  Using the Nutrition Facts label  The Nutrition Facts list is included on the labels of almost all packaged foods and beverages in the [place] It includes:  · The serving size.  · Information about nutrients in each serving, including the grams (g) of carbohydrate per serving.  To use the Nutrition Facts:  · Decide how many servings you will have.  · Multiply the number of servings by the number of carbohydrates per serving.  · The resulting number is the total amount of carbohydrates that you will be having.  Learning the standard serving sizes of foods  When you eat carbohydrate foods that are not packaged or do not include Nutrition Facts on the label, you need to measure the servings in order to count the amount of carbohydrates.  · Measure the foods that you will eat with a food scale or measuring cup, if needed.  · Decide how  many standard-size servings you will eat.  · Multiply the number of servings by 15. For foods that contain carbohydrates, one serving equals 15 g of carbohydrates.  ? For example, if you eat 2 cups or 10 oz (300 g) of strawberries, you will have eaten 2 servings and 30 g of carbohydrates (2 servings x 15 g = 30 g).  · For foods that have more than one food mixed, such as soups and casseroles, you must count the carbohydrates in each food that is included.  The following list contains standard serving sizes of common carbohydrate-rich foods. Each of these servings has about 15 g of carbohydrates:  · 1 slice of bread.  · 1 six-inch (15 cm) tortilla.  · ? cup or 2 oz (53 g) cooked rice or pasta.  · ½ cup or 3 oz (85 g) cooked or canned, drained and rinsed beans or lentils.  · ½ cup or 3 oz (85 g) starchy vegetable, such as peas, corn, or squash.  · ½ cup or 4 oz (120 g) hot cereal.  · ½ cup or 3 oz (85 g) boiled or mashed potatoes, or ¼ or 3 oz (85 g) of a large baked potato.  · ½ cup or 4 fl oz (118 mL) fruit juice.  · 1 cup or 8 fl oz (237 mL) milk.  · 1 small or 4 oz (106 g) apple.  · ½ or 2 oz (63 g) of a medium banana.  · 1 cup or 5 oz (150 g) strawberries.  · 3 cups or 1 oz (24 g) popped popcorn.  What is an example of carbohydrate counting?  To calculate the number of carbohydrates in this sample meal, follow the steps shown below.  Sample meal  · 3 oz (85 g) chicken breast.  · ? cup or 4 oz (106 g) brown rice.  · ½ cup or 3 oz (85 g) corn.  · 1 cup or 8 fl oz (237 mL) milk.  · 1 cup or 5 oz (150 g) strawberries with sugar-free whipped topping.  Carbohydrate calculation  1. Identify the foods that contain carbohydrates:  ? Rice.  ? Corn.  ? Milk.  ? Strawberries.  2. Calculate how many servings you have of each food:  ? 2 servings rice.  ? 1 serving corn.  ? 1 serving milk.  ? 1 serving strawberries.  3. Multiply each number of servings by 15 g:  ? 2 servings rice x 15 g = 30 g.  ? 1 serving corn x 15 g = 15  g.  ? 1 serving milk x 15 g = 15 g.  ? 1 serving strawberries x 15 g = 15 g.  4. Add together all of the amounts to find the total grams of carbohydrates eaten:  ? 30 g + 15 g + 15 g + 15 g = 75 g of carbohydrates total.  What are tips for following this plan?  Shopping  · Develop a meal plan and then make a shopping list.  · Buy fresh and frozen vegetables, fresh and frozen fruit, dairy, eggs, beans, lentils, and whole grains.  · Look at food labels. Choose foods that have more fiber and less sugar.  · Avoid processed foods and foods with added sugars.  Meal planning  · Aim to have the same amount of carbohydrates at each meal and for each snack time.  · Plan to have regular, balanced meals and snacks.  Where to find more information  · American Diabetes Association: www.diabetes.org  · Centers for Disease Control and Prevention: www.cdc.gov  Summary  · Carbohydrate counting is a method of keeping track of how many carbohydrates you eat.  · Eating carbohydrates naturally increases the amount of sugar (glucose) in the blood.  · Counting how many carbohydrates you eat improves your blood glucose control, which helps you manage your diabetes.  · A dietitian can help you make a meal plan and calculate how many carbohydrates you should have at each meal and snack.  This information is not intended to replace advice given to you by your health care provider. Make sure you discuss any questions you have with your health care provider.  Document Revised: 12/17/2020 Document Reviewed: 12/18/2020  [employer] Patient Education © 2021 [employer]      Exercising to Lose Weight  Exercise is structured, repetitive physical activity to improve fitness and health. Getting regular exercise is important for everyone. It is especially important if you are overweight. Being overweight increases your risk of heart disease, stroke, diabetes, high blood pressure, and several types of cancer. Reducing your calorie intake and exercising  can help you lose weight.  Exercise is usually categorized as moderate or vigorous intensity. To lose weight, most people need to do a certain amount of moderate-intensity or vigorous-intensity exercise each week.  Moderate-intensity exercise    Moderate-intensity exercise is any activity that gets you moving enough to burn at least three times more energy (calories) than if you were sitting.  Examples of moderate exercise include:  · Walking a mile in 15 minutes.  · Doing light yard work.  · Biking at an easy pace.  Most people should get at least 150 minutes (2 hours and 30 minutes) a week of moderate-intensity exercise to maintain their body weight.  Vigorous-intensity exercise  Vigorous-intensity exercise is any activity that gets you moving enough to burn at least six times more calories than if you were sitting. When you exercise at this intensity, you should be working hard enough that you are not able to carry on a conversation.  Examples of vigorous exercise include:  · Running.  · Playing a team sport, such as football, basketball, and soccer.  · Jumping rope.  Most people should get at least 75 minutes (1 hour and 15 minutes) a week of vigorous-intensity exercise to maintain their body weight.  How can exercise affect me?  When you exercise enough to burn more calories than you eat, you lose weight. Exercise also reduces body fat and builds muscle. The more muscle you have, the more calories you burn. Exercise also:  · Improves mood.  · Reduces stress and tension.  · Improves your overall fitness, flexibility, and endurance.  · Increases bone strength.  The amount of exercise you need to lose weight depends on:  · Your age.  · The type of exercise.  · Any health conditions you have.  · Your overall physical ability.  Talk to your health care provider about how much exercise you need and what types of activities are safe for you.  What actions can I take to lose weight?  Nutrition    · Make changes to your  diet as told by your health care provider or diet and nutrition specialist (dietitian). This may include:  ? Eating fewer calories.  ? Eating more protein.  ? Eating less unhealthy fats.  ? Eating a diet that includes fresh fruits and vegetables, whole grains, low-fat dairy products, and lean protein.  ? Avoiding foods with added fat, salt, and sugar.  · Drink plenty of water while you exercise to prevent dehydration or heat stroke.  Activity  · Choose an activity that you enjoy and set realistic goals. Your health care provider can help you make an exercise plan that works for you.  · Exercise at a moderate or vigorous intensity most days of the week.  ? The intensity of exercise may vary from person to person. You can tell how intense a workout is for you by paying attention to your breathing and heartbeat. Most people will notice their breathing and heartbeat get faster with more intense exercise.  · Do resistance training twice each week, such as:  ? Push-ups.  ? Sit-ups.  ? Lifting weights.  ? Using resistance bands.  · Getting short amounts of exercise can be just as helpful as long structured periods of exercise. If you have trouble finding time to exercise, try to include exercise in your daily routine.  ? Get up, stretch, and walk around every 30 minutes throughout the day.  ? Go for a walk during your lunch break.  ? Park your car farther away from your destination.  ? If you take public transportation, get off one stop early and walk the rest of the way.  ? Make phone calls while standing up and walking around.  ? Take the stairs instead of elevators or escalators.  · Wear comfortable clothes and shoes with good support.  · Do not exercise so much that you hurt yourself, feel dizzy, or get very short of breath.  Where to find more information  · [place] Department of Health and Human Services: www.hhs.gov  · Centers for Disease Control and Prevention (CDC): www.cdc.gov  Contact a health care  provider:  · Before starting a new exercise program.  · If you have questions or concerns about your weight.  · If you have a medical problem that keeps you from exercising.  Get help right away if you have any of the following while exercising:  · Injury.  · Dizziness.  · Difficulty breathing or shortness of breath that does not go away when you stop exercising.  · Chest pain.  · Rapid heartbeat.  Summary  · Being overweight increases your risk of heart disease, stroke, diabetes, high blood pressure, and several types of cancer.  · Losing weight happens when you burn more calories than you eat.  · Reducing the amount of calories you eat in addition to getting regular moderate or vigorous exercise each week helps you lose weight.  This information is not intended to replace advice given to you by your health care provider. Make sure you discuss any questions you have with your health care provider.  Document Revised: 04/15/2021 Document Reviewed: 04/15/2021  [employer] Patient Education © 2021 [employer]

## 2021-07-07 NOTE — ASSESSMENT & PLAN NOTE
Condition is stable. The current medical regimen is effective;  continue present plan and medications.

## 2021-07-16 ENCOUNTER — CLINICAL SUPPORT (OUTPATIENT)
Dept: FAMILY MEDICINE CLINIC | Facility: CLINIC | Age: 56
End: 2021-07-16

## 2021-07-16 DIAGNOSIS — Z23 IMMUNIZATION DUE: Primary | ICD-10-CM

## 2021-07-16 PROCEDURE — 90715 TDAP VACCINE 7 YRS/> IM: CPT | Performed by: FAMILY MEDICINE

## 2021-07-16 PROCEDURE — 90471 IMMUNIZATION ADMIN: CPT | Performed by: FAMILY MEDICINE

## 2021-07-16 NOTE — PROGRESS NOTES
Injection  Injection performed in LD by Robe Lancaster MA. Patient tolerated the procedure well without complications.  07/16/21   Robe Lancaster MA

## 2021-08-24 DIAGNOSIS — E78.49 OTHER HYPERLIPIDEMIA: ICD-10-CM

## 2021-08-24 RX ORDER — EZETIMIBE 10 MG/1
TABLET ORAL
Qty: 90 TABLET | Refills: 0 | OUTPATIENT
Start: 2021-08-24

## 2021-08-30 ENCOUNTER — PRIOR AUTHORIZATION (OUTPATIENT)
Dept: FAMILY MEDICINE CLINIC | Facility: CLINIC | Age: 56
End: 2021-08-30

## 2021-08-30 DIAGNOSIS — E11.65 TYPE 2 DIABETES MELLITUS WITH HYPERGLYCEMIA, WITHOUT LONG-TERM CURRENT USE OF INSULIN (HCC): Primary | ICD-10-CM

## 2021-08-30 NOTE — TELEPHONE ENCOUNTER
Toshia Velasco is for diabetes.  Come talk to me Wednesday about this issue and we will discuss it further.  Thanks, Dr. Lopez

## 2021-08-30 NOTE — TELEPHONE ENCOUNTER
Medication was DENIED.  I did send in his labs showing elevated LIPIDS and Triglycerides but PA required Triglycerides to be over 500 to qualify.     Please advise.    Toshia

## 2021-08-31 DIAGNOSIS — E11.65 TYPE 2 DIABETES MELLITUS WITH HYPERGLYCEMIA, WITHOUT LONG-TERM CURRENT USE OF INSULIN (HCC): ICD-10-CM

## 2021-08-31 NOTE — TELEPHONE ENCOUNTER
Yes that is correct. Sorry for the misunderstanding. I charted this on the wrong patient. My apologies.     *This patients medication is still pending PA for Diabetes/Rybelsus. I sent his prev labs including glucose and hemaglobin A1c as well as previous mediations tried: Metformin and Actos to insurance 8/30/21

## 2021-08-31 NOTE — TELEPHONE ENCOUNTER
The Rybelsus was denied by insurance for 3mg QD.  I spoke with his insurance and Melodie GARZA about this and she said its probably because this particular medication usually Titrates up to another dosage.     The plans limit for this dosage is 2 months which he has taken.     Toshia

## 2021-08-31 NOTE — TELEPHONE ENCOUNTER
Ok we sent that to the pharmacy and I called them they said it went through with a $40 copay.   I will let the patient know its complete.    Toshia

## 2021-09-02 ENCOUNTER — TELEPHONE (OUTPATIENT)
Dept: FAMILY MEDICINE CLINIC | Facility: CLINIC | Age: 56
End: 2021-09-02

## 2021-09-02 NOTE — TELEPHONE ENCOUNTER
Caller: Hal Alexandre    Relationship: Self    Best call back number: [phone]     What medications are you currently taking:   Current Outpatient Medications on File Prior to Visit   Medication Sig Dispense Refill   • amLODIPine (NORVASC) 10 MG tablet Take 1 tablet by mouth Daily for 180 days. 90 tablet 1   • atorvastatin (LIPITOR) 40 MG tablet Take 1 tablet by mouth Daily for 180 days. 90 tablet 1   • ezetimibe (ZETIA) 10 MG tablet Take 1 tablet by mouth Daily for 180 days. 90 tablet 1   • irbesartan (AVAPRO) 300 MG tablet Take 1 tablet by mouth Daily for 180 days. 90 tablet 1   • levothyroxine (Euthyrox) 175 MCG tablet Take 1 tablet by mouth Daily for 180 days. 90 tablet 1   • metFORMIN ER (GLUCOPHAGE-XR) 500 MG 24 hr tablet Take 4 tablets by mouth Daily With Breakfast for 180 days. 360 tablet 1   • pioglitazone (ACTOS) 30 MG tablet Take 1 tablet by mouth Daily for 180 days. 90 tablet 1   • Semaglutide 7 MG tablet Take 7 mg by mouth Daily. 30 tablet 5   • tadalafil (Cialis) 5 MG tablet Take 1 tablet by mouth Daily for 180 days. 90 tablet 1   • vitamin D (ERGOCALCIFEROL) 1.25 MG (40077 UT) capsule capsule Take 1 capsule by mouth Every 7 (Seven) Days for 270 days. 12 capsule 2     No current facility-administered medications on file prior to visit.              Which medication are you concerned about: PATIENT IS CALLING IN HE STATES HE WAS TAKING 3 MG OF RYBELSUS AND NOW PHARMACY SAYS THEY HAVE SEMAGLUTIDE 7 MG AND HE WANTS TO KNOW HOW THIS CHANGED AND WANTS SOME CLARIFICATION ON WHAT HE IS SUPPOSED TO BE TAKING.    Who prescribed you this medication: DOCTOR BORREGO

## 2021-09-03 NOTE — TELEPHONE ENCOUNTER
Pt aware of change, however, he said he spoke directly to his insurance company and they would pay for the Rybelsus. I explained to him that it was denied and the reasons why documented by his insurance company.   He said they will be sending us some paperwork. I told him that they did and it was still denied.    I told him I would keep and eye out for any additional paperwork if it does come.    Toshia

## 2021-09-07 RX ORDER — ORAL SEMAGLUTIDE 3 MG/1
3 TABLET ORAL DAILY
Qty: 90 TABLET | Refills: 0 | Status: SHIPPED | OUTPATIENT
Start: 2021-09-07 | End: 2022-01-31

## 2021-09-14 ENCOUNTER — TELEPHONE (OUTPATIENT)
Dept: FAMILY MEDICINE CLINIC | Facility: CLINIC | Age: 56
End: 2021-09-14

## 2021-09-14 DIAGNOSIS — I10 ESSENTIAL HYPERTENSION: ICD-10-CM

## 2021-09-14 DIAGNOSIS — U07.1 COVID-19: Primary | ICD-10-CM

## 2021-09-14 DIAGNOSIS — E11.65 TYPE 2 DIABETES MELLITUS WITH HYPERGLYCEMIA, WITHOUT LONG-TERM CURRENT USE OF INSULIN (HCC): ICD-10-CM

## 2021-09-14 NOTE — TELEPHONE ENCOUNTER
Pt tested positive for COVID on 9/13/21. He is wanting to have the infusion. I have filled out the paper work.

## 2021-09-14 NOTE — TELEPHONE ENCOUNTER
Caller: Hal Alexandre    Relationship to patient: Self    Best call back number: [phone]    Date of exposure: SATURDAY    Date of positive COVID19 test: 9/13/21    Date if possible COVID19 exposure:     COVID19 symptoms: COUGHING/PAIN IN RIBS/ RUNNY NOSE/CONGESTION    Date of initial quarantine: SATURDAY    Additional information or concerns: PATIENT ASKED IF A COUGH MEDICINE COULD BE CALLED IN FOR HIM?    What is the patients preferred pharmacy:     [address] [address] [place] [phone] [phone] [phone]

## 2021-09-15 ENCOUNTER — TELEMEDICINE (OUTPATIENT)
Dept: FAMILY MEDICINE CLINIC | Facility: CLINIC | Age: 56
End: 2021-09-15

## 2021-09-15 VITALS — HEIGHT: 69 IN | BODY MASS INDEX: 39.25 KG/M2 | WEIGHT: 265 LBS

## 2021-09-15 DIAGNOSIS — U07.1 COVID-19: Primary | ICD-10-CM

## 2021-09-15 PROCEDURE — 99442 PR PHYS/QHP TELEPHONE EVALUATION 11-20 MIN: CPT | Performed by: FAMILY MEDICINE

## 2021-09-15 RX ORDER — METHYLPREDNISOLONE SODIUM SUCCINATE 125 MG/2ML
125 INJECTION, POWDER, LYOPHILIZED, FOR SOLUTION INTRAMUSCULAR; INTRAVENOUS AS NEEDED
Status: CANCELLED | OUTPATIENT
Start: 2021-09-15

## 2021-09-15 RX ORDER — EPINEPHRINE 1 MG/ML
0.3 INJECTION, SOLUTION, CONCENTRATE INTRAVENOUS AS NEEDED
Status: CANCELLED | OUTPATIENT
Start: 2021-09-15

## 2021-09-15 RX ORDER — DIPHENHYDRAMINE HYDROCHLORIDE 50 MG/ML
50 INJECTION INTRAMUSCULAR; INTRAVENOUS AS NEEDED
Status: CANCELLED | OUTPATIENT
Start: 2021-09-15

## 2021-09-15 RX ORDER — GUAIFENESIN AND CODEINE PHOSPHATE 100; 10 MG/5ML; MG/5ML
10 SOLUTION ORAL 4 TIMES DAILY PRN
Qty: 240 ML | Refills: 0 | Status: SHIPPED | OUTPATIENT
Start: 2021-09-15 | End: 2021-09-25

## 2021-09-15 RX ORDER — SODIUM CHLORIDE 9 MG/ML
30 INJECTION, SOLUTION INTRAVENOUS ONCE
Status: CANCELLED | OUTPATIENT
Start: 2021-09-15

## 2021-09-15 RX ORDER — AMOXICILLIN AND CLAVULANATE POTASSIUM 875; 125 MG/1; MG/1
1 TABLET, FILM COATED ORAL 2 TIMES DAILY
Qty: 20 TABLET | Refills: 0 | Status: SHIPPED | OUTPATIENT
Start: 2021-09-15 | End: 2021-09-25

## 2021-09-15 NOTE — ASSESSMENT & PLAN NOTE
Due to productive cough, will send in Augmentin to pharmacy of choice.  Robitussin-AC to help with cough.  Patient advised strongly to go to the emergency room if he develops shortness of breath or worsening symptoms.  He will start with vitamins.  Follow-up examination Monday with video visit.

## 2021-09-15 NOTE — PROGRESS NOTES
You have chosen to receive care through a telephone visit. Do you consent to use a telephone visit for your medical care today? Yes   Mode of Visit: Telephone  The visit included telephone interaction. No technical issues occurred during this visit.   I spent 12 minutes caring for Hal on this date of service. This time includes time spent by me in the following activities:reviewing tests, obtaining and/or reviewing a separately obtained history, counseling and educating the patient/family/caregiver, ordering medications, tests, or procedures and documenting information in the medical record   Subjective       Chief Complaint   Patient presents with   • Covid-19 Home Monitoring Video Visit         History of Present Illness       Rudolph is a 56 y.o. male who presents to  [facility] today who is following up on recent COVID-19 diagnosis.  He continues to have cough and does have some rib pain with coughing.  He specifically is asking for cough syrup.  He is not running fever.  Cough is productive of grayish mucus in the morning.  He denies shortness of breath but admits that he is isolating on one floor of his house and not moving much.  No loss of smell or taste reported.  His wife also has COVID-19.  Patient is scheduled for antibody infusion tomorrow.  We reviewed all of his over-the-counter medicines.  He is drinking more orange juice.               Objective                         Assessment/Plan     Assessment and Plan      Diagnoses and all orders for this visit:    1. COVID-19 (Primary)  Assessment & Plan:  Due to productive cough, will send in Augmentin to pharmacy of choice.  Robitussin-AC to help with cough.  Patient advised strongly to go to the emergency room if he develops shortness of breath or worsening symptoms.  He will start with vitamins.  Follow-up examination Monday with video visit.    Orders:  -     amoxicillin-clavulanate  (Augmentin) 875-125 MG per tablet; Take 1 tablet by mouth 2 (Two) Times a Day for 10 days.  Dispense: 20 tablet; Refill: 0  -     guaiFENesin-codeine (GUAIFENESIN AC) 100-10 MG/5ML liquid; Take 10 mL by mouth 4 (Four) Times a Day As Needed for Cough for up to 10 days.  Dispense: 240 mL; Refill: 0        Follow Up  Wrapup  Review (Popup)  Communications :23}    Return in 5 days (on 9/20/2021) for Video Visit.

## 2021-09-15 NOTE — PATIENT INSTRUCTIONS

## 2021-09-16 ENCOUNTER — HOSPITAL ENCOUNTER (OUTPATIENT)
Dept: INFUSION THERAPY | Facility: HOSPITAL | Age: 56
Discharge: HOME | End: 2021-09-16
Admitting: FAMILY MEDICINE

## 2021-09-16 VITALS
RESPIRATION RATE: 18 BRPM | DIASTOLIC BLOOD PRESSURE: 61 MMHG | SYSTOLIC BLOOD PRESSURE: 118 MMHG | HEART RATE: 86 BPM | TEMPERATURE: 97.3 F | OXYGEN SATURATION: 97 %

## 2021-09-16 DIAGNOSIS — U07.1 COVID-19: Primary | ICD-10-CM

## 2021-09-16 PROCEDURE — M0243 CASIRIVI AND IMDEVI INFUSION: HCPCS | Performed by: FAMILY MEDICINE

## 2021-09-16 PROCEDURE — 96365 THER/PROPH/DIAG IV INF INIT: CPT

## 2021-09-16 PROCEDURE — 25010000006 INJECTION, CASIRIVIMAB AND IMDEVIMAB, 1200 MG: Performed by: FAMILY MEDICINE

## 2021-09-16 RX ORDER — DIPHENHYDRAMINE HYDROCHLORIDE 50 MG/ML
50 INJECTION INTRAMUSCULAR; INTRAVENOUS AS NEEDED
Status: DISCONTINUED | OUTPATIENT
Start: 2021-09-16 | End: 2021-09-18 | Stop reason: HOSPADM

## 2021-09-16 RX ORDER — SODIUM CHLORIDE 9 MG/ML
30 INJECTION, SOLUTION INTRAVENOUS ONCE
Status: COMPLETED | OUTPATIENT
Start: 2021-09-16 | End: 2021-09-16

## 2021-09-16 RX ORDER — DIPHENHYDRAMINE HYDROCHLORIDE 50 MG/ML
50 INJECTION INTRAMUSCULAR; INTRAVENOUS AS NEEDED
Status: CANCELLED | OUTPATIENT
Start: 2021-09-16

## 2021-09-16 RX ORDER — METHYLPREDNISOLONE SODIUM SUCCINATE 125 MG/2ML
125 INJECTION, POWDER, LYOPHILIZED, FOR SOLUTION INTRAMUSCULAR; INTRAVENOUS AS NEEDED
Status: CANCELLED | OUTPATIENT
Start: 2021-09-16

## 2021-09-16 RX ORDER — SODIUM CHLORIDE 9 MG/ML
30 INJECTION, SOLUTION INTRAVENOUS ONCE
Status: CANCELLED | OUTPATIENT
Start: 2021-09-16

## 2021-09-16 RX ORDER — METHYLPREDNISOLONE SODIUM SUCCINATE 125 MG/2ML
125 INJECTION, POWDER, LYOPHILIZED, FOR SOLUTION INTRAMUSCULAR; INTRAVENOUS AS NEEDED
Status: DISCONTINUED | OUTPATIENT
Start: 2021-09-16 | End: 2021-09-18 | Stop reason: HOSPADM

## 2021-09-16 RX ADMIN — CASIRIVIMAB AND IMDEVIMAB: 600; 600 INJECTION, SOLUTION, CONCENTRATE INTRAVENOUS at 08:50

## 2021-09-16 RX ADMIN — SODIUM CHLORIDE 30 ML: 9 INJECTION, SOLUTION INTRAVENOUS at 08:50

## 2021-09-16 NOTE — PROGRESS NOTES
Addended by: NANCI BERNAL on: 7/22/2019 03:44 PM     Modules accepted: Orders     Patient here for Regeneron infusion for Covid 19 infection. Fact sheet for EUA of Regen-COV given and explained. Several questions regarding the use of this investigational drug including risks, benefits, and possible side effects discussed. Advised patient not to receive Covid vaccine for 90 days. Pt agreed with plan of care and to proceed with infusion.    PIV started in LAC x 1 attempt without complication, Pt tolerated well. NS to flush after meds.    Pt instructed to follow CDC recommendations for quarantine period, good handwashing and wearing a mask. Pt instructed to rest and stay hydrated.    Regeneron infusion started. Call light in reach. Pt instructed to use call light for any needs or concerns.    Infusion and 1 hour monitoring period completed, NAD noted. Pt instructed to monitor for signs of delayed reaction and report to nearest ED for life threatening s/s.    Appropriate PPE worn during the care of the patient, including N95 mask, goggles, gloves, gown and hair cover.

## 2021-09-20 ENCOUNTER — TELEMEDICINE (OUTPATIENT)
Dept: FAMILY MEDICINE CLINIC | Facility: CLINIC | Age: 56
End: 2021-09-20

## 2021-09-20 VITALS — WEIGHT: 265 LBS | BODY MASS INDEX: 39.25 KG/M2 | HEIGHT: 69 IN

## 2021-09-20 DIAGNOSIS — U07.1 COVID-19: Primary | ICD-10-CM

## 2021-09-20 PROCEDURE — 99212 OFFICE O/P EST SF 10 MIN: CPT | Performed by: FAMILY MEDICINE

## 2021-09-20 NOTE — ASSESSMENT & PLAN NOTE
Much improved with the addition of Augmentin.  Finish antibiotics.  Finish quarantining this week.  Return to clinic as needed.

## 2021-09-20 NOTE — PROGRESS NOTES
"Chief Complaint  Covid-19 Home Monitoring Video Visit    Maritza Galdamez presents to [facility] to follow-up on Covid infection.  Since starting antibiotics he has been doing well.  2 days after starting the antibiotics he was cough free.  He continues to be afebrile.  Overall he feels well and no medicine side effects reported with Augmentin.  He will continue to quarantine for the remainder of this week and then be released from quarantine.          Objective   Vital Signs:   Vitals:    09/20/21 1356   Weight: 120 kg (265 lb)   Height: 175.3 cm (69\")        Physical Exam   Constitutional: He appears well-developed. No distress.   Eyes: Conjunctivae are normal. No scleral icterus.   Neck: Neck normal appearance.  Pulmonary/Chest: Effort normal.  He no audible wheeze...  Neurological: He is alert.   Skin: Skin is dry.   Psychiatric: He has a normal mood and affect.       Result Review :                Assessment and Plan    Diagnoses and all orders for this visit:    1. COVID-19 (Primary)  Assessment & Plan:  Much improved with the addition of Augmentin.  Finish antibiotics.  Finish quarantining this week.  Return to clinic as needed.        Follow Up   Return if symptoms worsen or fail to improve.  Patient was given instructions and counseling regarding his condition or for health maintenance advice. Please see specific information pulled into the AVS if appropriate.   "

## 2021-10-20 ENCOUNTER — FLU SHOT (OUTPATIENT)
Dept: FAMILY MEDICINE CLINIC | Facility: CLINIC | Age: 56
End: 2021-10-20

## 2021-10-20 DIAGNOSIS — Z23 NEED FOR INFLUENZA VACCINATION: Primary | ICD-10-CM

## 2021-10-20 PROCEDURE — 90686 IIV4 VACC NO PRSV 0.5 ML IM: CPT | Performed by: FAMILY MEDICINE

## 2021-10-20 PROCEDURE — 90471 IMMUNIZATION ADMIN: CPT | Performed by: FAMILY MEDICINE

## 2022-01-10 DIAGNOSIS — E78.49 OTHER HYPERLIPIDEMIA: ICD-10-CM

## 2022-01-10 DIAGNOSIS — I10 ESSENTIAL HYPERTENSION: ICD-10-CM

## 2022-01-12 ENCOUNTER — TELEPHONE (OUTPATIENT)
Dept: FAMILY MEDICINE CLINIC | Facility: CLINIC | Age: 57
End: 2022-01-12

## 2022-01-12 DIAGNOSIS — E11.65 TYPE 2 DIABETES MELLITUS WITH HYPERGLYCEMIA, WITHOUT LONG-TERM CURRENT USE OF INSULIN: ICD-10-CM

## 2022-01-12 DIAGNOSIS — I10 ESSENTIAL HYPERTENSION: ICD-10-CM

## 2022-01-12 DIAGNOSIS — E78.49 OTHER HYPERLIPIDEMIA: ICD-10-CM

## 2022-01-12 RX ORDER — AMLODIPINE BESYLATE 10 MG/1
10 TABLET ORAL DAILY
Qty: 30 TABLET | Refills: 0 | Status: SHIPPED | OUTPATIENT
Start: 2022-01-12 | End: 2022-02-03 | Stop reason: SDUPTHER

## 2022-01-12 RX ORDER — ATORVASTATIN CALCIUM 40 MG/1
TABLET, FILM COATED ORAL
Qty: 90 TABLET | Refills: 0 | OUTPATIENT
Start: 2022-01-12

## 2022-01-12 RX ORDER — ATORVASTATIN CALCIUM 40 MG/1
40 TABLET, FILM COATED ORAL DAILY
Qty: 60 TABLET | Refills: 0 | Status: SHIPPED | OUTPATIENT
Start: 2022-01-12 | End: 2022-02-03 | Stop reason: SDUPTHER

## 2022-01-12 RX ORDER — METFORMIN HYDROCHLORIDE 500 MG/1
2000 TABLET, EXTENDED RELEASE ORAL
Qty: 120 TABLET | Refills: 1 | Status: SHIPPED | OUTPATIENT
Start: 2022-01-12 | End: 2022-02-03 | Stop reason: SDUPTHER

## 2022-01-12 RX ORDER — AMLODIPINE BESYLATE 10 MG/1
TABLET ORAL
Qty: 90 TABLET | Refills: 0 | OUTPATIENT
Start: 2022-01-12

## 2022-01-12 NOTE — TELEPHONE ENCOUNTER
Caller: Hal Alexandre    Relationship: Self    Best call back number: [phone]    Requested Prescriptions:   Requested Prescriptions     Pending Prescriptions Disp Refills   • amLODIPine (NORVASC) 10 MG tablet 90 tablet 1     Sig: Take 1 tablet by mouth Daily for 180 days.   • atorvastatin (LIPITOR) 40 MG tablet 90 tablet 1     Sig: Take 1 tablet by mouth Daily for 180 days.   • metFORMIN ER (GLUCOPHAGE-XR) 500 MG 24 hr tablet 360 tablet 1     Sig: Take 4 tablets by mouth Daily With Breakfast for 180 days.        Pharmacy where request should be sent: [address] [address] [facility] [phone] [facility] [phone]      Additional details provided by patient: PATIENT IS OUT AND SCHEDULED AN APPOINTMENT FOR 2-3-22 AND IS REQUESTING A REFILL TO GET HIM TO HIS APPOINTMENT. WIFE STATES THE PATIENT IS GETTING IRRITATED BECAUSE HE IS OUT.     Does the patient have less than a 3 day supply:  [x] Yes  [] No    Mike Mendes Rep   01/12/22 08:18 EST

## 2022-01-31 RX ORDER — ORAL SEMAGLUTIDE 3 MG/1
1 TABLET ORAL
Qty: 30 TABLET | Refills: 0 | Status: SHIPPED | OUTPATIENT
Start: 2022-01-31 | End: 2022-02-03 | Stop reason: SDUPTHER

## 2022-02-03 ENCOUNTER — TELEMEDICINE (OUTPATIENT)
Dept: FAMILY MEDICINE CLINIC | Facility: CLINIC | Age: 57
End: 2022-02-03

## 2022-02-03 DIAGNOSIS — N40.1 BPH ASSOCIATED WITH NOCTURIA: ICD-10-CM

## 2022-02-03 DIAGNOSIS — R35.1 BPH ASSOCIATED WITH NOCTURIA: ICD-10-CM

## 2022-02-03 DIAGNOSIS — N52.9 VASCULOGENIC ERECTILE DYSFUNCTION, UNSPECIFIED VASCULOGENIC ERECTILE DYSFUNCTION TYPE: ICD-10-CM

## 2022-02-03 DIAGNOSIS — E55.9 VITAMIN D DEFICIENCY: ICD-10-CM

## 2022-02-03 DIAGNOSIS — E03.9 ACQUIRED HYPOTHYROIDISM: ICD-10-CM

## 2022-02-03 DIAGNOSIS — E78.49 OTHER HYPERLIPIDEMIA: ICD-10-CM

## 2022-02-03 DIAGNOSIS — E11.65 TYPE 2 DIABETES MELLITUS WITH HYPERGLYCEMIA, WITHOUT LONG-TERM CURRENT USE OF INSULIN: ICD-10-CM

## 2022-02-03 DIAGNOSIS — I10 ESSENTIAL HYPERTENSION: Primary | ICD-10-CM

## 2022-02-03 PROCEDURE — 99214 OFFICE O/P EST MOD 30 MIN: CPT | Performed by: FAMILY MEDICINE

## 2022-02-03 RX ORDER — ORAL SEMAGLUTIDE 3 MG/1
1 TABLET ORAL
Qty: 30 TABLET | Refills: 5 | Status: SHIPPED | OUTPATIENT
Start: 2022-02-03 | End: 2022-08-02

## 2022-02-03 RX ORDER — PIOGLITAZONEHYDROCHLORIDE 30 MG/1
30 TABLET ORAL DAILY
Qty: 30 TABLET | Refills: 5 | Status: SHIPPED | OUTPATIENT
Start: 2022-02-03 | End: 2022-08-15 | Stop reason: SDUPTHER

## 2022-02-03 RX ORDER — LEVOTHYROXINE SODIUM 175 UG/1
175 TABLET ORAL DAILY
Qty: 30 TABLET | Refills: 5 | Status: SHIPPED | OUTPATIENT
Start: 2022-02-03 | End: 2022-08-15 | Stop reason: SDUPTHER

## 2022-02-03 RX ORDER — EZETIMIBE 10 MG/1
10 TABLET ORAL DAILY
Qty: 30 TABLET | Refills: 5 | Status: SHIPPED | OUTPATIENT
Start: 2022-02-03 | End: 2022-08-15 | Stop reason: SDUPTHER

## 2022-02-03 RX ORDER — TADALAFIL 5 MG/1
5 TABLET ORAL DAILY
Qty: 30 TABLET | Refills: 5 | Status: SHIPPED | OUTPATIENT
Start: 2022-02-03 | End: 2023-02-13 | Stop reason: SINTOL

## 2022-02-03 RX ORDER — IRBESARTAN 300 MG/1
300 TABLET ORAL DAILY
Qty: 30 TABLET | Refills: 5 | Status: SHIPPED | OUTPATIENT
Start: 2022-02-03 | End: 2022-08-15 | Stop reason: SDUPTHER

## 2022-02-03 RX ORDER — AMLODIPINE BESYLATE 10 MG/1
10 TABLET ORAL DAILY
Qty: 30 TABLET | Refills: 5 | Status: SHIPPED | OUTPATIENT
Start: 2022-02-03 | End: 2022-08-15 | Stop reason: SDUPTHER

## 2022-02-03 RX ORDER — METFORMIN HYDROCHLORIDE 500 MG/1
2000 TABLET, EXTENDED RELEASE ORAL
Qty: 120 TABLET | Refills: 5 | Status: SHIPPED | OUTPATIENT
Start: 2022-02-03 | End: 2022-08-15 | Stop reason: SDUPTHER

## 2022-02-03 RX ORDER — ATORVASTATIN CALCIUM 40 MG/1
40 TABLET, FILM COATED ORAL DAILY
Qty: 30 TABLET | Refills: 5 | Status: SHIPPED | OUTPATIENT
Start: 2022-02-03 | End: 2022-08-15 | Stop reason: SDUPTHER

## 2022-02-03 NOTE — PROGRESS NOTES
Mode of Visit: Video  Location of patient: other: in parked car.  You have chosen to receive care through a telehealth visit.  Does the patient consent to use a video/audio connection for your medical care today? Yes  The visit included audio and video interaction. No technical issues occurred during this visit.      Subjective       Chief Complaint   Patient presents with   • Hyperlipidemia   • Diabetes   • Hypothyroidism   • Hypertension         HPI:        Rudolph is a 56 y.o. male who presents to  [address] [facility] today    to refill medications and review labs.  Overall he feels well. No medication side effects are reported.   BP stable. Dm improving with rybelsus. Elevation LFT once again identified. He drinks etoh on fridays and saturdays only. Lipids are controlled.             PE:   Objective   There were no vitals filed for this visit.     There is no height or weight on file to calculate BMI.    Physical Exam   Constitutional: He appears well-developed. No distress. He appears obese.  Eyes: Conjunctivae are normal. No scleral icterus.   Neck: Neck normal appearance.  Pulmonary/Chest: Effort normal.  He no audible wheeze...  Neurological: He is alert.   Skin: Skin is dry.   Psychiatric: He has a normal mood and affect.             The following data was reviewed by: Elliott Lopez MD on 02/03/2022:  Vitamin D 25 Hydroxy (01/28/2022 09:23)  MicroAlbumin, Urine, Random - Urine, Clean Catch (01/28/2022 09:23)  Hemoglobin A1c (01/28/2022 09:23)  TSH (01/28/2022 09:23)  CK (01/28/2022 09:23)  Lipid Panel With / Chol / HDL Ratio (01/28/2022 09:23)  CBC & Differential (01/28/2022 09:23)  Comprehensive Metabolic Panel (01/28/2022 09:23)                A/P:     Assessment/Plan   Diagnoses and all orders for this visit:    1. Essential hypertension (Primary)  Assessment & Plan:  Hypertension is unchanged.  Continue current treatment regimen.  Blood pressure will be  reassessed at the next regular appointment.    Orders:  -     amLODIPine (NORVASC) 10 MG tablet; Take 1 tablet by mouth Daily for 180 days.  Dispense: 30 tablet; Refill: 5  -     irbesartan (AVAPRO) 300 MG tablet; Take 1 tablet by mouth Daily for 180 days.  Dispense: 30 tablet; Refill: 5  -     Comprehensive Metabolic Panel; Future  -     CBC & Differential; Future    2. Other hyperlipidemia  Assessment & Plan:  Lipid abnormalities are improving with treatment.  Pharmacotherapy as ordered.  Lipids will be reassessed in 6 months.    Orders:  -     atorvastatin (LIPITOR) 40 MG tablet; Take 1 tablet by mouth Daily for 180 days.  Dispense: 30 tablet; Refill: 5  -     ezetimibe (ZETIA) 10 MG tablet; Take 1 tablet by mouth Daily for 180 days.  Dispense: 30 tablet; Refill: 5  -     Lipid Panel With / Chol / HDL Ratio; Future  -     CK; Future    3. Acquired hypothyroidism  Assessment & Plan:  Worsening of tsh elevation. No medication change for now. Check labs in 6 months for trend.     Orders:  -     levothyroxine (Euthyrox) 175 MCG tablet; Take 1 tablet by mouth Daily for 180 days.  Dispense: 30 tablet; Refill: 5  -     TSH; Future    4. Type 2 diabetes mellitus with hyperglycemia, without long-term current use of insulin (HCC)  Assessment & Plan:  Diabetes is improving with treatment.   Continue current treatment regimen.  Diabetes will be reassessed in 6 months.    Orders:  -     metFORMIN ER (GLUCOPHAGE-XR) 500 MG 24 hr tablet; Take 4 tablets by mouth Daily With Breakfast for 180 days.  Dispense: 120 tablet; Refill: 5  -     pioglitazone (ACTOS) 30 MG tablet; Take 1 tablet by mouth Daily for 180 days.  Dispense: 30 tablet; Refill: 5  -     Rybelsus 3 MG tablet; Take 1 tablet by mouth Every Morning Before Breakfast for 180 days.  Dispense: 30 tablet; Refill: 5  -     Comprehensive Metabolic Panel; Future  -     Hemoglobin A1c; Future  -     MicroAlbumin, Urine, Random - Urine, Clean Catch; Future    5. BPH associated  with nocturia  Assessment & Plan:  The current medical regimen is effective;  continue present plan and medications.      Orders:  -     tadalafil (Cialis) 5 MG tablet; Take 1 tablet by mouth Daily for 180 days.  Dispense: 30 tablet; Refill: 5  -     PSA DIAGNOSTIC; Future    6. Vasculogenic erectile dysfunction, unspecified vasculogenic erectile dysfunction type  Assessment & Plan:  The current medical regimen is effective;  continue present plan and medications.      Orders:  -     tadalafil (Cialis) 5 MG tablet; Take 1 tablet by mouth Daily for 180 days.  Dispense: 30 tablet; Refill: 5    7. Vitamin D deficiency  Assessment & Plan:  Resolved. Stop vitamin d. Recheck labs in 6 months    Orders:  -     Vitamin D 25 Hydroxy; Future        Follow up:   Return in about 6 months (around 8/3/2022) for recheck/refill medication.  Patient was given instructions and counseling regarding his condition or for health maintenance advice. Please see specific information pulled into the AVS if appropriate.

## 2022-08-02 ENCOUNTER — OFFICE VISIT (OUTPATIENT)
Dept: FAMILY MEDICINE CLINIC | Facility: CLINIC | Age: 57
End: 2022-08-02

## 2022-08-02 VITALS
TEMPERATURE: 98.3 F | HEIGHT: 69 IN | SYSTOLIC BLOOD PRESSURE: 136 MMHG | HEART RATE: 81 BPM | OXYGEN SATURATION: 97 % | BODY MASS INDEX: 39.25 KG/M2 | WEIGHT: 265 LBS | RESPIRATION RATE: 16 BRPM | DIASTOLIC BLOOD PRESSURE: 83 MMHG

## 2022-08-02 DIAGNOSIS — J06.9 UPPER RESPIRATORY TRACT INFECTION, UNSPECIFIED TYPE: Primary | ICD-10-CM

## 2022-08-02 DIAGNOSIS — R05.8 COUGH PRODUCTIVE OF YELLOW SPUTUM: ICD-10-CM

## 2022-08-02 PROCEDURE — 99213 OFFICE O/P EST LOW 20 MIN: CPT

## 2022-08-02 RX ORDER — BENZONATATE 100 MG/1
100 CAPSULE ORAL 3 TIMES DAILY PRN
Qty: 30 CAPSULE | Refills: 1 | Status: SHIPPED | OUTPATIENT
Start: 2022-08-02 | End: 2022-08-15

## 2022-08-02 RX ORDER — DOXYCYCLINE HYCLATE 100 MG/1
100 CAPSULE ORAL 2 TIMES DAILY
Qty: 20 CAPSULE | Refills: 0 | Status: SHIPPED | OUTPATIENT
Start: 2022-08-02 | End: 2022-08-12

## 2022-08-02 NOTE — PROGRESS NOTES
"Chief Complaint  URI and Cough    Subjective          History of Present Illness    Hal Alexandre 56 y.o. male who presents for evaluation of upper respiratory congestion, and cough. Symptoms include congestion and cough described as productive of yellow sputum.  Onset of symptoms was 8 days ago, stable since that time. Patient denies shortness of breath, wheezing, hemoptysis, pleuritic chest pain, fever, dyspnea on exertion, ear drainage, eye discharge, diarrhea, vomiting, vertigo, sneezing, chills, sweats, sinus pain, epistaxis, and high fever.   Evaluation to date: was seen at an urgent care center Treatment to date:  NyQuil.     He had a negative COVID test on Friday and again yesterday.    He  denies medication side effects.    Review of Systems   Constitutional: Negative for appetite change, chills, fatigue and fever.   HENT: Positive for congestion (chest). Negative for sinus pressure and trouble swallowing.    Eyes: Negative for visual disturbance.   Respiratory: Positive for cough. Negative for chest tightness, shortness of breath and wheezing.    Cardiovascular: Negative for chest pain, palpitations and leg swelling.   Gastrointestinal: Negative for abdominal pain, constipation, diarrhea, nausea and vomiting.   Genitourinary: Negative for dysuria, frequency and urgency.   Musculoskeletal: Negative for gait problem, myalgias and neck pain.   Skin: Negative for rash.   Neurological: Negative for dizziness, syncope, weakness and light-headedness.   Psychiatric/Behavioral: Negative for dysphoric mood. The patient is not nervous/anxious.         Objective   Vital Signs:   /83   Pulse 81   Temp 98.3 °F (36.8 °C)   Resp 16   Ht 175.3 cm (69\")   Wt 120 kg (265 lb)   SpO2 97%   BMI 39.13 kg/m²        Physical Exam  Vitals and nursing note reviewed.   Constitutional:       General: He is not in acute distress.     Appearance: Normal appearance. He is well-developed. He is not toxic-appearing or " diaphoretic.   HENT:      Head: Normocephalic and atraumatic. Hair is normal.      Right Ear: External ear normal. No drainage, swelling or tenderness. Tympanic membrane is retracted.      Left Ear: External ear normal. No drainage, swelling or tenderness. Tympanic membrane is retracted.      Nose: No mucosal edema or rhinorrhea.      Mouth/Throat:      Mouth: Mucous membranes are moist. No oral lesions.      Pharynx: Uvula midline. Posterior oropharyngeal erythema present. No pharyngeal swelling, oropharyngeal exudate or uvula swelling.      Tonsils: No tonsillar exudate.   Eyes:      General: No scleral icterus.        Right eye: No discharge.         Left eye: No discharge.      Conjunctiva/sclera: Conjunctivae normal.      Pupils: Pupils are equal, round, and reactive to light.   Cardiovascular:      Rate and Rhythm: Normal rate and regular rhythm.      Heart sounds: Normal heart sounds. No murmur heard.    No gallop.   Pulmonary:      Effort: No respiratory distress.      Breath sounds: Normal breath sounds. No stridor or decreased air movement. No decreased breath sounds, wheezing, rhonchi or rales.   Chest:      Chest wall: No tenderness.   Abdominal:      Palpations: Abdomen is soft.      Tenderness: There is no abdominal tenderness.   Musculoskeletal:      Cervical back: Normal range of motion and neck supple.   Lymphadenopathy:      Cervical: No cervical adenopathy.   Skin:     General: Skin is warm and dry.      Findings: No rash.   Neurological:      Mental Status: He is alert and oriented to person, place, and time.      Motor: No abnormal muscle tone.   Psychiatric:         Behavior: Behavior normal.         Thought Content: Thought content normal.         Judgment: Judgment normal.                         Assessment and Plan      Diagnoses and all orders for this visit:    1. Upper respiratory tract infection, unspecified type (Primary)  -     doxycycline (VIBRAMYCIN) 100 MG capsule; Take 1 capsule by  mouth 2 (Two) Times a Day for 10 days.  Dispense: 20 capsule; Refill: 0  -     benzonatate (Tessalon Perles) 100 MG capsule; Take 1 capsule by mouth 3 (Three) Times a Day As Needed for Cough.  Dispense: 30 capsule; Refill: 1    2. Cough productive of yellow sputum  -     doxycycline (VIBRAMYCIN) 100 MG capsule; Take 1 capsule by mouth 2 (Two) Times a Day for 10 days.  Dispense: 20 capsule; Refill: 0  -     benzonatate (Tessalon Perles) 100 MG capsule; Take 1 capsule by mouth 3 (Three) Times a Day As Needed for Cough.  Dispense: 30 capsule; Refill: 1            Follow Up     Return if symptoms worsen or fail to improve.    Patient was given instructions and counseling regarding his condition or for health maintenance advice. Please see specific information pulled into the AVS if appropriate.     -Take medication as prescribed.  -Covid test was negative on Friday and again yesterday.  -Monitor for fever and take Tylenol as needed.  Drink plenty of fluids and get plenty of rest.  -Use cool-mist humidifier as needed.  -Seek immediate medical attention for fever unrelieved by Tylenol, chest pain, shortness of breath, decreased oxygen saturations, sharp back pain, or any other worsening signs or symptoms.  -The patient verbalized understanding of all instructions given today.

## 2022-08-15 ENCOUNTER — OFFICE VISIT (OUTPATIENT)
Dept: FAMILY MEDICINE CLINIC | Facility: CLINIC | Age: 57
End: 2022-08-15

## 2022-08-15 VITALS
TEMPERATURE: 98.4 F | WEIGHT: 285 LBS | HEART RATE: 68 BPM | SYSTOLIC BLOOD PRESSURE: 138 MMHG | BODY MASS INDEX: 42.21 KG/M2 | HEIGHT: 69 IN | RESPIRATION RATE: 16 BRPM | OXYGEN SATURATION: 96 % | DIASTOLIC BLOOD PRESSURE: 84 MMHG

## 2022-08-15 DIAGNOSIS — E03.9 ACQUIRED HYPOTHYROIDISM: ICD-10-CM

## 2022-08-15 DIAGNOSIS — E11.65 TYPE 2 DIABETES MELLITUS WITH HYPERGLYCEMIA, WITHOUT LONG-TERM CURRENT USE OF INSULIN: ICD-10-CM

## 2022-08-15 DIAGNOSIS — I10 ESSENTIAL HYPERTENSION: ICD-10-CM

## 2022-08-15 DIAGNOSIS — E78.49 OTHER HYPERLIPIDEMIA: ICD-10-CM

## 2022-08-15 DIAGNOSIS — Z00.00 ANNUAL PHYSICAL EXAM: Primary | ICD-10-CM

## 2022-08-15 DIAGNOSIS — Z00.8 ENCOUNTER FOR BIOMETRIC SCREENING: ICD-10-CM

## 2022-08-15 PROCEDURE — 99396 PREV VISIT EST AGE 40-64: CPT

## 2022-08-15 RX ORDER — AMLODIPINE BESYLATE 10 MG/1
10 TABLET ORAL DAILY
Qty: 30 TABLET | Refills: 5 | Status: SHIPPED | OUTPATIENT
Start: 2022-08-15 | End: 2023-02-13 | Stop reason: SDUPTHER

## 2022-08-15 RX ORDER — ATORVASTATIN CALCIUM 40 MG/1
40 TABLET, FILM COATED ORAL DAILY
Qty: 30 TABLET | Refills: 5 | Status: SHIPPED | OUTPATIENT
Start: 2022-08-15 | End: 2023-02-13 | Stop reason: SDUPTHER

## 2022-08-15 RX ORDER — EZETIMIBE 10 MG/1
10 TABLET ORAL DAILY
Qty: 30 TABLET | Refills: 5 | Status: SHIPPED | OUTPATIENT
Start: 2022-08-15 | End: 2023-02-13 | Stop reason: SDUPTHER

## 2022-08-15 RX ORDER — LEVOTHYROXINE SODIUM 175 UG/1
175 TABLET ORAL DAILY
Qty: 30 TABLET | Refills: 5 | Status: SHIPPED | OUTPATIENT
Start: 2022-08-15 | End: 2022-08-23 | Stop reason: DRUGHIGH

## 2022-08-15 RX ORDER — IRBESARTAN 300 MG/1
300 TABLET ORAL DAILY
Qty: 30 TABLET | Refills: 5 | Status: SHIPPED | OUTPATIENT
Start: 2022-08-15 | End: 2023-02-13 | Stop reason: SDUPTHER

## 2022-08-15 RX ORDER — ORAL SEMAGLUTIDE 3 MG/1
3 TABLET ORAL
Qty: 90 TABLET | Refills: 1 | Status: SHIPPED | OUTPATIENT
Start: 2022-08-15 | End: 2023-02-11

## 2022-08-15 RX ORDER — METFORMIN HYDROCHLORIDE 500 MG/1
2000 TABLET, EXTENDED RELEASE ORAL
Qty: 120 TABLET | Refills: 5 | Status: SHIPPED | OUTPATIENT
Start: 2022-08-15 | End: 2023-02-13 | Stop reason: SDUPTHER

## 2022-08-15 RX ORDER — PIOGLITAZONEHYDROCHLORIDE 30 MG/1
30 TABLET ORAL DAILY
Qty: 30 TABLET | Refills: 5 | Status: SHIPPED | OUTPATIENT
Start: 2022-08-15 | End: 2022-08-23 | Stop reason: DRUGHIGH

## 2022-08-15 NOTE — PROGRESS NOTES
"Chief Complaint  Annual Exam, Hypertension, Hypothyroidism, and Hyperlipidemia    Subjective          History of Present Illness    Hal Alexandre 57 y.o. male who presents today for biometric screening and annual physical exam.  The patient had labs ordered by his PCP on 06/30/2022.  The patient is not fasting today.  He reports he will make a lab appointment today and come back for labs to be drawn.    The patient's waist circumference is 49 inches.    The patient is requesting medication refills today.  He reports he tolerates all medications well with no side effects.  Will refill medications.    The patient would not wait today to have health maintenance updated.     Since the last visit, He has overall felt well.      He  denies medication side effects.    Review of Systems   Constitutional: Negative for appetite change, chills, fatigue and fever.   HENT: Negative for congestion, sinus pressure and trouble swallowing.    Eyes: Negative for visual disturbance.   Respiratory: Negative for cough, chest tightness, shortness of breath and wheezing.    Cardiovascular: Negative for chest pain, palpitations and leg swelling.   Gastrointestinal: Negative for abdominal pain, constipation, diarrhea, nausea and vomiting.   Genitourinary: Negative for dysuria, frequency and urgency.   Musculoskeletal: Negative for gait problem, myalgias and neck pain.   Skin: Negative for rash.   Neurological: Negative for dizziness, syncope, weakness and light-headedness.   Psychiatric/Behavioral: Negative for dysphoric mood. The patient is not nervous/anxious.         Objective   Vital Signs:   /84   Pulse 68   Temp 98.4 °F (36.9 °C)   Resp 16   Ht 175.3 cm (69\")   Wt 129 kg (285 lb)   SpO2 96%   BMI 42.09 kg/m²      Class 3 Severe Obesity (BMI >=40). Obesity-related health conditions include the following: hypertension, diabetes mellitus, dyslipidemias and osteoarthritis. Obesity is worsening. BMI is is above average; BMI " management plan is completed. We discussed portion control and increasing exercise.        Physical Exam  Vitals and nursing note reviewed.   Constitutional:       General: He is not in acute distress.     Appearance: He is well-developed. He is obese.   HENT:      Head: Normocephalic and atraumatic.   Eyes:      General: No scleral icterus.        Right eye: No discharge.         Left eye: No discharge.      Conjunctiva/sclera: Conjunctivae normal.      Pupils: Pupils are equal, round, and reactive to light.   Neck:      Thyroid: No thyromegaly.      Trachea: No tracheal deviation.   Cardiovascular:      Rate and Rhythm: Normal rate and regular rhythm.      Pulses: Normal pulses.      Heart sounds: Normal heart sounds. No murmur heard.    No gallop.   Pulmonary:      Effort: Pulmonary effort is normal. No respiratory distress.      Breath sounds: Normal breath sounds. No wheezing or rales.   Musculoskeletal:         General: Normal range of motion.      Cervical back: Normal range of motion and neck supple.   Skin:     General: Skin is warm.      Coloration: Skin is not pale.      Findings: No erythema or rash.   Neurological:      Mental Status: He is alert and oriented to person, place, and time.      Motor: No weakness or abnormal muscle tone.      Coordination: Coordination normal.      Gait: Gait normal.   Psychiatric:         Behavior: Behavior normal.         Thought Content: Thought content normal.         Judgment: Judgment normal.                         Assessment and Plan      Diagnoses and all orders for this visit:    1. Annual physical exam (Primary)  Comments:  Normal exam  Health maintenance reviewed  Weight loss, daily exercise  Follow up with PCP in 6 months    2. Essential hypertension  Comments:  Fairly well-controlled on current medication  Dash diet, weight loss, exercise  Follow-up with PCP in 6 months  Orders:  -     irbesartan (AVAPRO) 300 MG tablet; Take 1 tablet by mouth Daily for 180  days.  Dispense: 30 tablet; Refill: 5  -     amLODIPine (NORVASC) 10 MG tablet; Take 1 tablet by mouth Daily for 180 days.  Dispense: 30 tablet; Refill: 5    3. Encounter for biometric screening  Comments:  Vitals, waist circumference obtained  Will make appointment for fasting labs  Work on weight loss, daily exercise  Follow-up with PCP in 6 months    4. Other hyperlipidemia  Comments:  Well-controlled on current medication  Low-cholesterol diet, weight loss, exercise  Labs ordered by PCP  Follow-up with PCP in 6 months  Orders:  -     atorvastatin (LIPITOR) 40 MG tablet; Take 1 tablet by mouth Daily for 180 days.  Dispense: 30 tablet; Refill: 5  -     ezetimibe (ZETIA) 10 MG tablet; Take 1 tablet by mouth Daily for 180 days.  Dispense: 30 tablet; Refill: 5    5. Acquired hypothyroidism  Comments:  TSH elevated on previous lab  Continue current medication  Will make appointment for labs ordered by PCP  Follow-up with PCP in 6 months  Orders:  -     Discontinue: levothyroxine (Euthyrox) 175 MCG tablet; Take 1 tablet by mouth Daily for 180 days.  Dispense: 30 tablet; Refill: 5    6. Type 2 diabetes mellitus with hyperglycemia, without long-term current use of insulin (HCC)  Comments:  Last A1c slightly above goal  Will have A1c drawn ordered by PCP  Low carbohydrate/no sweets diet, exercise, weight loss  Follow-up with PCP in 6 months  Orders:  -     metFORMIN ER (GLUCOPHAGE-XR) 500 MG 24 hr tablet; Take 4 tablets by mouth Daily With Breakfast for 180 days.  Dispense: 120 tablet; Refill: 5  -     Discontinue: pioglitazone (ACTOS) 30 MG tablet; Take 1 tablet by mouth Daily for 180 days.  Dispense: 30 tablet; Refill: 5  -     Rybelsus 3 MG tablet; Take 1 tablet by mouth Every Morning Before Breakfast for 180 days.  Dispense: 90 tablet; Refill: 1            Follow Up     Return in about 6 months (around 2/15/2023) for Next scheduled follow up with PCP.    Patient was given instructions and counseling regarding his  condition or for health maintenance advice. Please see specific information pulled into the AVS if appropriate.     Preventative counseling provided during visit regarding healthy diet, daily exercise, weight loss, and the following:  Low glycemic index diet  Exercise 30 minutes most days of the week  Make sure you get results on any labs or tests we ordered today  We discussed medications and how to take them as prescribed  Sleep 6-8 hours each night if possible    LDL goal <100  HDL goal >60  Triglyceride goal <150  BP goal =<130/80  Fasting glucose <100    -Follow up with PCP in 6 months.

## 2022-08-19 DIAGNOSIS — E55.9 VITAMIN D DEFICIENCY: ICD-10-CM

## 2022-08-19 RX ORDER — ERGOCALCIFEROL 1.25 MG/1
CAPSULE ORAL
Qty: 4 CAPSULE | Refills: 0 | OUTPATIENT
Start: 2022-08-19

## 2022-08-23 ENCOUNTER — TELEPHONE (OUTPATIENT)
Dept: FAMILY MEDICINE CLINIC | Facility: CLINIC | Age: 57
End: 2022-08-23

## 2022-08-23 ENCOUNTER — OFFICE VISIT (OUTPATIENT)
Dept: FAMILY MEDICINE CLINIC | Facility: CLINIC | Age: 57
End: 2022-08-23

## 2022-08-23 VITALS
WEIGHT: 285 LBS | TEMPERATURE: 97.4 F | HEIGHT: 69 IN | HEART RATE: 79 BPM | OXYGEN SATURATION: 99 % | SYSTOLIC BLOOD PRESSURE: 132 MMHG | BODY MASS INDEX: 42.21 KG/M2 | RESPIRATION RATE: 16 BRPM | DIASTOLIC BLOOD PRESSURE: 82 MMHG

## 2022-08-23 DIAGNOSIS — E11.65 TYPE 2 DIABETES MELLITUS WITH HYPERGLYCEMIA, WITHOUT LONG-TERM CURRENT USE OF INSULIN: Primary | ICD-10-CM

## 2022-08-23 DIAGNOSIS — E03.9 ACQUIRED HYPOTHYROIDISM: ICD-10-CM

## 2022-08-23 DIAGNOSIS — I10 ESSENTIAL HYPERTENSION: ICD-10-CM

## 2022-08-23 DIAGNOSIS — E11.65 TYPE 2 DIABETES MELLITUS WITH HYPERGLYCEMIA, WITHOUT LONG-TERM CURRENT USE OF INSULIN: ICD-10-CM

## 2022-08-23 DIAGNOSIS — E78.49 OTHER HYPERLIPIDEMIA: ICD-10-CM

## 2022-08-23 PROCEDURE — 99214 OFFICE O/P EST MOD 30 MIN: CPT

## 2022-08-23 RX ORDER — LEVOTHYROXINE SODIUM 0.2 MG/1
200 TABLET ORAL DAILY
Qty: 30 TABLET | Refills: 1 | Status: SHIPPED | OUTPATIENT
Start: 2022-08-23 | End: 2022-10-07 | Stop reason: DRUGHIGH

## 2022-08-23 RX ORDER — PIOGLITAZONEHYDROCHLORIDE 45 MG/1
45 TABLET ORAL DAILY
Qty: 90 TABLET | Refills: 1 | Status: SHIPPED | OUTPATIENT
Start: 2022-08-23 | End: 2023-02-13 | Stop reason: SDUPTHER

## 2022-08-23 RX ORDER — LEVOTHYROXINE SODIUM 0.2 MG/1
200 TABLET ORAL DAILY
Qty: 30 TABLET | Refills: 1 | Status: SHIPPED | OUTPATIENT
Start: 2022-08-23 | End: 2022-08-23 | Stop reason: SDUPTHER

## 2022-08-23 RX ORDER — PIOGLITAZONEHYDROCHLORIDE 45 MG/1
45 TABLET ORAL DAILY
Qty: 90 TABLET | Refills: 1 | Status: SHIPPED | OUTPATIENT
Start: 2022-08-23 | End: 2022-08-23 | Stop reason: SDUPTHER

## 2022-08-23 NOTE — TELEPHONE ENCOUNTER
Caller: Sarah Sauceda    Relationship: Emergency Contact    Best call back number: [phone]    What is the best time to reach you: ANY    Who are you requesting to speak with (clinical staff, provider,  specific staff member): CLINICAL STAFF    What was the call regarding: PATIENT'S WIFE STATES THE PATIENT HAS BEEN EXPERIENCING FATIGUE AND SWEATS. PATIENT HAS A SCHEDULED APPOINTMENT FOR 09.15.22 TO DISCUSS LABS AND MEDICATION CHANGES BUT THE PATIENT'S WIFE IS REQUESTING HE GET WORKED IN SOONER OR GO ON AND MAKE THE RECOMMENDED CHANGES TO THE PATIENTS MEDICATION, AND THE PATIENT WILL KEEP HIS UPCOMING APPOINTMENT TO COME IN AND DISCUSS    Do you require a callback: YES

## 2022-08-23 NOTE — TELEPHONE ENCOUNTER
Meds were sent incorrectly to [facility]. Resent to [facility] at patient's request

## 2022-08-23 NOTE — PROGRESS NOTES
Chief Complaint  Hypertension, Hypothyroidism, Hyperlipidemia, and Diabetes    Subjective          History of Present Illness    Hal Alexandre 56 y.o. male who presents today to review of recent lab results.       The patient had labs drawn on 08/16/2022.  Hemoglobin A1c increased from 7.6% to 8.1%.  He admits that he misses 2 doses per week of Rybelsus 3 mg because [place]  He admits he eats a moderate amount of carbohydrates and sweets.  He does not exercise.  He reports he is compliant with taking Metformin 4 tablets daily and Actos 30 mg daily.  He tolerates both medications well with no side effects.      The patient's TSH increased and was elevated at 9.060.  The patient reports he is compliant with taking Levothyroxine 175 mcg daily.  He tolerates the medication well with no side effects.     Total cholesterol was normal at 124, triglycerides improved and are normal at 121, HDL improved but is still low at 38, and LDL cholesterol is normal at 64.  Today, he reports recent fatigue.  This is most likely due to abnormal hemoglobin A1c and abnormal TSH levels.  He denies chest pain, shortness of breath, palpitations, abdominal pain, nausea, vomiting, headaches, blurred vision, dizziness, near-syncope, and syncope.    He  denies medication side effects.    Review of Systems   Constitutional: Positive for fatigue. Negative for appetite change, chills and fever.   HENT: Negative for congestion, sinus pressure and trouble swallowing.    Eyes: Negative for visual disturbance.   Respiratory: Negative for cough, chest tightness, shortness of breath and wheezing.    Cardiovascular: Negative for chest pain, palpitations and leg swelling.   Gastrointestinal: Negative for abdominal pain, constipation, diarrhea, nausea and vomiting.   Endocrine: Negative for polydipsia, polyphagia and polyuria.   Genitourinary: Negative for dysuria, frequency and urgency.   Musculoskeletal:  "Negative for gait problem, myalgias and neck pain.   Skin: Negative for rash.   Neurological: Negative for dizziness, syncope, weakness and light-headedness.   Psychiatric/Behavioral: Negative for dysphoric mood. The patient is not nervous/anxious.         Objective   Vital Signs:   /82   Pulse 79   Temp 97.4 °F (36.3 °C)   Resp 16   Ht 175.3 cm (69\")   Wt 129 kg (285 lb)   SpO2 99%   BMI 42.09 kg/m²      Class 3 Severe Obesity (BMI >=40). Obesity-related health conditions include the following: hypertension, diabetes mellitus and dyslipidemias. Obesity is unchanged. BMI is is above average; BMI management plan is completed. We discussed low calorie, low carb based diet program, portion control and increasing exercise.        Physical Exam  Vitals and nursing note reviewed.   Constitutional:       General: He is not in acute distress.     Appearance: Normal appearance. He is well-developed. He is obese.   HENT:      Head: Normocephalic and atraumatic.   Eyes:      General: No scleral icterus.        Right eye: No discharge.         Left eye: No discharge.      Conjunctiva/sclera: Conjunctivae normal.      Pupils: Pupils are equal, round, and reactive to light.   Neck:      Thyroid: No thyromegaly.      Trachea: No tracheal deviation.   Cardiovascular:      Rate and Rhythm: Normal rate and regular rhythm.      Pulses: Normal pulses.      Heart sounds: Normal heart sounds. No murmur heard.    No gallop.   Pulmonary:      Effort: Pulmonary effort is normal. No respiratory distress.      Breath sounds: Normal breath sounds. No wheezing or rales.   Musculoskeletal:         General: Normal range of motion.      Cervical back: Normal range of motion and neck supple.   Skin:     General: Skin is warm.      Coloration: Skin is not pale.      Findings: No erythema or rash.   Neurological:      Mental Status: He is alert and oriented to person, place, and time.      Motor: No weakness or abnormal muscle tone.      " Coordination: Coordination normal.      Gait: Gait normal.   Psychiatric:         Behavior: Behavior normal.         Thought Content: Thought content normal.         Judgment: Judgment normal.          The following data was reviewed by: SOFIA Sharpe on 08/23/2022:  Hemoglobin A1c (08/16/2022 09:14)  TSH (08/16/2022 09:14)  Lipid Panel With / Chol / HDL Ratio (08/16/2022 09:14)               Assessment and Plan      Diagnoses and all orders for this visit:    1. Type 2 diabetes mellitus with hyperglycemia, without long-term current use of insulin (HCC) (Primary)  Comments:  A1C worsened  Cont. Metformin and Rybelsus, increase Actos to 45 mg  Decrease carbs/avoid sweets!, exercise!, weight loss!  Repeat A1C in 3 months  Orders:  -     pioglitazone (Actos) 45 MG tablet; Take 1 tablet by mouth Daily.  Dispense: 90 tablet; Refill: 1  -     Hemoglobin A1c; Future    2. Acquired hypothyroidism  Comments:  TSH elevated  Increase Levothyroxine to 200 mcg  Repeat thyroid labs in 6 weeks  Orders:  -     levothyroxine (Synthroid) 200 MCG tablet; Take 1 tablet by mouth Daily.  Dispense: 30 tablet; Refill: 1  -     TSH+Free T4; Future    3. Essential hypertension  Comments:  Improved with manual BP recheck  DASH diet, BP meds, exercise, weight loss  F/U with PCP    4. Other hyperlipidemia  Comments:  Well controlled on current medication  Low-cholesterol diet, exercise, weight loss  F/U with PCP            Follow Up     Return in about 23 days (around 9/15/2022) for Next scheduled follow up with PCP.    Patient was given instructions and counseling regarding his condition or for health maintenance advice. Please see specific information pulled into the AVS if appropriate.     -Increase Actos to 45 mg.  Continue Metformin & Rybelsus.  -Increase Levothyroxine to 200 mcg  -Recheck thyroid labs in 6 weeks  -Recheck A1C in 3 months  -He has a scheduled appointment with PCP Dr. Lopez on 09/15/2022.

## 2022-09-12 ENCOUNTER — PRIOR AUTHORIZATION (OUTPATIENT)
Dept: FAMILY MEDICINE CLINIC | Facility: CLINIC | Age: 57
End: 2022-09-12

## 2022-10-07 DIAGNOSIS — E03.9 ACQUIRED HYPOTHYROIDISM: Primary | ICD-10-CM

## 2022-10-07 RX ORDER — LEVOTHYROXINE SODIUM 175 UG/1
175 TABLET ORAL DAILY
Qty: 30 TABLET | Refills: 1 | Status: SHIPPED | OUTPATIENT
Start: 2022-10-07 | End: 2022-11-14 | Stop reason: SDUPTHER

## 2022-11-14 DIAGNOSIS — E03.9 ACQUIRED HYPOTHYROIDISM: ICD-10-CM

## 2022-11-14 RX ORDER — LEVOTHYROXINE SODIUM 175 UG/1
175 TABLET ORAL DAILY
Qty: 30 TABLET | Refills: 1 | Status: SHIPPED | OUTPATIENT
Start: 2022-11-14 | End: 2023-02-13 | Stop reason: SDUPTHER

## 2022-11-14 RX ORDER — LEVOTHYROXINE SODIUM 175 UG/1
175 TABLET ORAL DAILY
Qty: 30 TABLET | Refills: 1 | Status: SHIPPED | OUTPATIENT
Start: 2022-11-14 | End: 2022-11-14

## 2022-11-14 NOTE — TELEPHONE ENCOUNTER
Rx Refill Note  Requested Prescriptions     Pending Prescriptions Disp Refills   • levothyroxine (SYNTHROID, LEVOTHROID) 175 MCG tablet 30 tablet 1     Sig: Take 1 tablet by mouth Daily.      Last office visit with prescribing clinician: 8/23/2022    Next office visit with prescribing clinician: Visit date not found

## 2022-11-14 NOTE — TELEPHONE ENCOUNTER
Caller: Hal Alexandre    Relationship: Self    Best call back number: [phone]    Requested Prescriptions:   Requested Prescriptions     Pending Prescriptions Disp Refills   • levothyroxine (SYNTHROID, LEVOTHROID) 175 MCG tablet 30 tablet 1     Sig: Take 1 tablet by mouth Daily.        Pharmacy where request should be sent: [facility] [address] [place] [id number] [facility] [phone] [facility] [phone] FX     Additional details provided by patient: PATIENT IS COMPLETELY OUT OF MEDICATION AT THIS TIME     Does the patient have less than a 3 day supply:  [x] Yes  [] No    Mike Camarillo Rep   11/14/22 15:35 EST

## 2022-11-30 DIAGNOSIS — E03.9 ACQUIRED HYPOTHYROIDISM: Primary | ICD-10-CM

## 2022-12-19 DIAGNOSIS — E03.9 ACQUIRED HYPOTHYROIDISM: ICD-10-CM

## 2022-12-19 RX ORDER — LEVOTHYROXINE SODIUM 175 UG/1
175 TABLET ORAL DAILY
Qty: 30 TABLET | Refills: 1 | OUTPATIENT
Start: 2022-12-19

## 2022-12-19 NOTE — TELEPHONE ENCOUNTER
Caller: Sarah Sauceda    Relationship: Emergency Contact    Best call back number: [phone]    Requested Prescriptions:   Requested Prescriptions     Pending Prescriptions Disp Refills   • levothyroxine (SYNTHROID, LEVOTHROID) 175 MCG tablet 30 tablet 1     Sig: Take 1 tablet by mouth Daily.        Pharmacy where request should be sent: [facility]     Additional details provided by patient: PATIENT ADVISED THAT HE HAS TO CALL IN EVERY OTHER MONTH TO GET THIS FILLED.  CAN YOU PLEASE MAKE THIS AVAILABLE FOR A YEAR OF REFILLS?    Does the patient have less than a 3 day supply:  [x] Yes  [] No    Would you like a call back once the refill request has been completed: [] Yes [x] No    If the office needs to give you a call back, can they leave a voicemail: [] Yes [x] No    Mike Jay Rep   12/19/22 13:59 EST

## 2022-12-20 NOTE — TELEPHONE ENCOUNTER
PATIENTS WIFE IS CALLING IN SHE STATES THAT PHARMACY IS TELLING HER THAT PATIENT DOES NOT HAVE ANY REFILLS ON FILE AND HE HAS BEEN OUT OF THIS MEDICATION, THEY HAVE BEEN GOING BACK AND FORTH TRYING TO GET THIS FILLED. THEY WOULD LIKE CALLBACK WHEN THIS HAS BEEN SENT THROUGH.    PLEASE ADVISE    CALLBACK NUMBER IS  [phone]      CONFIRMED PHARMACY    [facility] [address] [place] [id number] [facility] [phone] [facility] [phone] FX       SHE STATES THIS IS NOT SUPPOSED TO GO TO THE [facility] THEY WOULD LIKE THAT TAKEN OF IT.

## 2023-02-13 ENCOUNTER — OFFICE VISIT (OUTPATIENT)
Dept: FAMILY MEDICINE CLINIC | Facility: CLINIC | Age: 58
End: 2023-02-13
Payer: COMMERCIAL

## 2023-02-13 VITALS
OXYGEN SATURATION: 96 % | WEIGHT: 289 LBS | RESPIRATION RATE: 18 BRPM | BODY MASS INDEX: 42.8 KG/M2 | HEART RATE: 87 BPM | SYSTOLIC BLOOD PRESSURE: 126 MMHG | HEIGHT: 69 IN | DIASTOLIC BLOOD PRESSURE: 78 MMHG | TEMPERATURE: 98.1 F

## 2023-02-13 DIAGNOSIS — Z11.59 NEED FOR HEPATITIS C SCREENING TEST: ICD-10-CM

## 2023-02-13 DIAGNOSIS — N52.9 VASCULOGENIC ERECTILE DYSFUNCTION, UNSPECIFIED VASCULOGENIC ERECTILE DYSFUNCTION TYPE: ICD-10-CM

## 2023-02-13 DIAGNOSIS — N40.1 BPH ASSOCIATED WITH NOCTURIA: ICD-10-CM

## 2023-02-13 DIAGNOSIS — E11.9 TYPE 2 DIABETES MELLITUS WITHOUT COMPLICATION, WITHOUT LONG-TERM CURRENT USE OF INSULIN: ICD-10-CM

## 2023-02-13 DIAGNOSIS — I10 ESSENTIAL HYPERTENSION: Primary | ICD-10-CM

## 2023-02-13 DIAGNOSIS — R35.1 BPH ASSOCIATED WITH NOCTURIA: ICD-10-CM

## 2023-02-13 DIAGNOSIS — E03.9 ACQUIRED HYPOTHYROIDISM: ICD-10-CM

## 2023-02-13 DIAGNOSIS — E66.01 CLASS 3 SEVERE OBESITY DUE TO EXCESS CALORIES WITH SERIOUS COMORBIDITY AND BODY MASS INDEX (BMI) OF 40.0 TO 44.9 IN ADULT: ICD-10-CM

## 2023-02-13 DIAGNOSIS — E55.9 VITAMIN D DEFICIENCY: ICD-10-CM

## 2023-02-13 DIAGNOSIS — E78.49 OTHER HYPERLIPIDEMIA: ICD-10-CM

## 2023-02-13 PROBLEM — E66.813 CLASS 3 SEVERE OBESITY DUE TO EXCESS CALORIES WITH SERIOUS COMORBIDITY AND BODY MASS INDEX (BMI) OF 40.0 TO 44.9 IN ADULT: Status: ACTIVE | Noted: 2023-02-13

## 2023-02-13 PROCEDURE — 99214 OFFICE O/P EST MOD 30 MIN: CPT | Performed by: FAMILY MEDICINE

## 2023-02-13 RX ORDER — ATORVASTATIN CALCIUM 40 MG/1
40 TABLET, FILM COATED ORAL DAILY
Qty: 90 TABLET | Refills: 2 | Status: SHIPPED | OUTPATIENT
Start: 2023-02-13 | End: 2023-11-10

## 2023-02-13 RX ORDER — AMLODIPINE BESYLATE 10 MG/1
10 TABLET ORAL DAILY
Qty: 90 TABLET | Refills: 2 | Status: SHIPPED | OUTPATIENT
Start: 2023-02-13 | End: 2023-11-10

## 2023-02-13 RX ORDER — LEVOTHYROXINE SODIUM 175 UG/1
175 TABLET ORAL
Qty: 90 TABLET | Refills: 2 | Status: SHIPPED | OUTPATIENT
Start: 2023-02-13 | End: 2023-11-10

## 2023-02-13 RX ORDER — ORAL SEMAGLUTIDE 3 MG/1
3 TABLET ORAL
Qty: 90 TABLET | Refills: 2 | Status: SHIPPED | OUTPATIENT
Start: 2023-02-13 | End: 2023-11-10

## 2023-02-13 RX ORDER — PIOGLITAZONEHYDROCHLORIDE 45 MG/1
45 TABLET ORAL DAILY
Qty: 90 TABLET | Refills: 2 | Status: SHIPPED | OUTPATIENT
Start: 2023-02-13 | End: 2023-11-10

## 2023-02-13 RX ORDER — METFORMIN HYDROCHLORIDE 500 MG/1
2000 TABLET, EXTENDED RELEASE ORAL
Qty: 360 TABLET | Refills: 2 | Status: SHIPPED | OUTPATIENT
Start: 2023-02-13 | End: 2023-11-10

## 2023-02-13 RX ORDER — IRBESARTAN 300 MG/1
300 TABLET ORAL DAILY
Qty: 90 TABLET | Refills: 2 | Status: SHIPPED | OUTPATIENT
Start: 2023-02-13 | End: 2023-11-10

## 2023-02-13 RX ORDER — ORAL SEMAGLUTIDE 3 MG/1
TABLET ORAL
COMMUNITY
Start: 2023-02-12 | End: 2023-02-13 | Stop reason: SDUPTHER

## 2023-02-13 RX ORDER — SILDENAFIL CITRATE 20 MG/1
TABLET ORAL
Qty: 50 TABLET | Refills: 11 | Status: SHIPPED | OUTPATIENT
Start: 2023-02-13

## 2023-02-13 RX ORDER — EZETIMIBE 10 MG/1
10 TABLET ORAL DAILY
Qty: 90 TABLET | Refills: 2 | Status: SHIPPED | OUTPATIENT
Start: 2023-02-13 | End: 2023-11-10

## 2023-02-13 NOTE — PROGRESS NOTES
"Chief Complaint  Hypertension (Med refills )    Maritza Galdamez presents to [facility] for medication refills. Overall he feels well. Good medication compliance is reported. No medication side effects are reported.            Objective   Vital Signs:   Vitals:    02/13/23 0837   BP: 126/78   Pulse: 87   Resp: 18   Temp: 98.1 °F (36.7 °C)   SpO2: 96%   Weight: 131 kg (289 lb)   Height: 175.3 cm (69\")                Physical Exam  Vitals reviewed.   Constitutional:       General: He is not in acute distress.     Appearance: He is morbidly obese.   Eyes:      General: Lids are normal.      Conjunctiva/sclera: Conjunctivae normal.   Neck:      Vascular: No carotid bruit.      Trachea: No tracheal deviation.   Cardiovascular:      Rate and Rhythm: Normal rate and regular rhythm.      Heart sounds: Normal heart sounds. No murmur heard.  Pulmonary:      Effort: Pulmonary effort is normal.      Breath sounds: Normal breath sounds.   Skin:     General: Skin is warm and dry.   Neurological:      Mental Status: He is alert. He is not disoriented.   Psychiatric:         Speech: Speech normal.         Behavior: Behavior normal. Behavior is cooperative.          Result Review :                Assessment and Plan    Diagnoses and all orders for this visit:    1. Essential hypertension (Primary)  Assessment & Plan:  Hypertension is unchanged.  Continue current treatment regimen.  Blood pressure will be reassessed at the next regular appointment.    Orders:  -     amLODIPine (NORVASC) 10 MG tablet; Take 1 tablet by mouth Daily for 270 days.  Dispense: 90 tablet; Refill: 2  -     irbesartan (AVAPRO) 300 MG tablet; Take 1 tablet by mouth Daily for 270 days.  Dispense: 90 tablet; Refill: 2  -     Comprehensive Metabolic Panel; Future  -     CBC & Differential; Future    2. Type 2 diabetes mellitus without complication, without long-term current use of insulin ([facility])  Assessment & Plan:  Diabetes " is improving with treatment.   Continue current treatment regimen.  Diabetes will be reassessed in 6 months.    Orders:  -     metFORMIN ER (GLUCOPHAGE-XR) 500 MG 24 hr tablet; Take 4 tablets by mouth Daily With Breakfast for 270 days.  Dispense: 360 tablet; Refill: 2  -     pioglitazone (Actos) 45 MG tablet; Take 1 tablet by mouth Daily for 270 days.  Dispense: 90 tablet; Refill: 2  -     Rybelsus 3 MG tablet; Take 1 tablet by mouth Every Morning Before Breakfast for 270 days.  Dispense: 90 tablet; Refill: 2  -     Comprehensive Metabolic Panel; Future  -     Hemoglobin A1c; Future  -     MicroAlbumin, Urine, Random - Urine, Clean Catch; Future    3. Other hyperlipidemia  Assessment & Plan:  Lipid abnormalities are unchanged.  Pharmacotherapy as ordered.  Lipids will be reassessed in 6 months.    Orders:  -     atorvastatin (LIPITOR) 40 MG tablet; Take 1 tablet by mouth Daily for 270 days.  Dispense: 90 tablet; Refill: 2  -     ezetimibe (ZETIA) 10 MG tablet; Take 1 tablet by mouth Daily for 270 days.  Dispense: 90 tablet; Refill: 2  -     Lipid Panel With LDL / HDL Ratio; Future  -     CK; Future    4. Acquired hypothyroidism  Assessment & Plan:  The current medical regimen is effective;  continue present plan and medications.      Orders:  -     levothyroxine (SYNTHROID, LEVOTHROID) 175 MCG tablet; Take 1 tablet by mouth Every Morning for 270 days.  Dispense: 90 tablet; Refill: 2  -     TSH; Future    5. Vasculogenic erectile dysfunction, unspecified vasculogenic erectile dysfunction type  Assessment & Plan:  Change to sildenafil due to side effects of reflux with cialis.     Orders:  -     sildenafil (REVATIO) 20 MG tablet; Take 3-5 tablets  po 60 minutes prior to sexual activity with a light meal as needed  Dispense: 50 tablet; Refill: 11  -     PSA DIAGNOSTIC; Future    6. BPH associated with nocturia  Assessment & Plan:  Nocturia symptoms are stable. PSA will be monitored with annual labs.       Orders:  -      PSA DIAGNOSTIC; Future    7. Vitamin D deficiency  Assessment & Plan:  Check vitamin D in 6 months.    Orders:  -     Vitamin D,25-Hydroxy; Future    8. Class 3 severe obesity due to excess calories with serious comorbidity and body mass index (BMI) of 40.0 to 44.9 in adult (HCC)  Assessment & Plan:  Patient's (Body mass index is 42.68 kg/m².) indicates that they are morbidly/severely obese (BMI > 40 or > 35 with obesity - related health condition) with health conditions that include hypertension, diabetes mellitus and dyslipidemias . Weight is worsening. BMI  is above average; BMI management plan is completed. We discussed portion control, increasing exercise and Weight Watchers or other Commercial based weight reduction program.       9. Need for hepatitis C screening test  -     Hepatitis C Antibody; Future      Follow Up   Return in about 6 months (around 8/13/2023) for Adult wellness & medication appt, schedule 30 min.  Patient was given instructions and counseling regarding his condition or for health maintenance advice. Please see specific information pulled into the AVS if appropriate.

## 2023-02-13 NOTE — ASSESSMENT & PLAN NOTE
Patient's (Body mass index is 42.68 kg/m².) indicates that they are morbidly/severely obese (BMI > 40 or > 35 with obesity - related health condition) with health conditions that include hypertension, diabetes mellitus and dyslipidemias . Weight is worsening. BMI  is above average; BMI management plan is completed. We discussed portion control, increasing exercise and Weight Watchers or other Commercial based weight reduction program.

## 2023-05-30 DIAGNOSIS — E11.9 TYPE 2 DIABETES MELLITUS WITHOUT COMPLICATION, WITHOUT LONG-TERM CURRENT USE OF INSULIN: ICD-10-CM

## 2023-05-30 RX ORDER — METFORMIN HYDROCHLORIDE 500 MG/1
TABLET, EXTENDED RELEASE ORAL
Qty: 120 TABLET | Refills: 0 | Status: SHIPPED | OUTPATIENT
Start: 2023-05-30

## 2023-05-30 NOTE — TELEPHONE ENCOUNTER
Rx Refill Note  Requested Prescriptions     Pending Prescriptions Disp Refills   • metFORMIN ER (GLUCOPHAGE-XR) 500 MG 24 hr tablet [Pharmacy Med Name: metFORMIN HCl  MG Oral Tablet Extended Release 24 Hour] 120 tablet 0     Sig: TAKE 4 TABLETS BY MOUTH ONCE DAILY WITH BREAKFAST      Last office visit with prescribing clinician: 8/23/2022   Last telemedicine visit with prescribing clinician: Visit date not found   Next office visit with prescribing clinician: Visit date not found

## 2023-08-01 ENCOUNTER — TELEPHONE (OUTPATIENT)
Dept: FAMILY MEDICINE CLINIC | Facility: CLINIC | Age: 58
End: 2023-08-01
Payer: COMMERCIAL

## 2023-08-01 DIAGNOSIS — E55.9 VITAMIN D DEFICIENCY: ICD-10-CM

## 2023-08-01 DIAGNOSIS — I10 ESSENTIAL HYPERTENSION: Primary | ICD-10-CM

## 2023-08-01 DIAGNOSIS — E11.9 TYPE 2 DIABETES MELLITUS WITHOUT COMPLICATION, WITHOUT LONG-TERM CURRENT USE OF INSULIN: ICD-10-CM

## 2023-08-01 DIAGNOSIS — R35.1 BPH ASSOCIATED WITH NOCTURIA: ICD-10-CM

## 2023-08-01 DIAGNOSIS — E78.49 OTHER HYPERLIPIDEMIA: ICD-10-CM

## 2023-08-01 DIAGNOSIS — N40.1 BPH ASSOCIATED WITH NOCTURIA: ICD-10-CM

## 2023-08-01 DIAGNOSIS — E03.9 ACQUIRED HYPOTHYROIDISM: ICD-10-CM

## 2023-08-01 DIAGNOSIS — Z11.59 NEED FOR HEPATITIS C SCREENING TEST: ICD-10-CM

## 2023-08-01 DIAGNOSIS — E66.01 CLASS 3 SEVERE OBESITY DUE TO EXCESS CALORIES WITH SERIOUS COMORBIDITY AND BODY MASS INDEX (BMI) OF 40.0 TO 44.9 IN ADULT: ICD-10-CM

## 2023-08-02 LAB
25(OH)D3+25(OH)D2 SERPL-MCNC: 34.6 NG/ML (ref 30–100)
ALBUMIN SERPL-MCNC: 4.1 G/DL (ref 3.5–5.2)
ALBUMIN/CREAT UR: 9 MG/G CREAT (ref 0–29)
ALBUMIN/GLOB SERPL: 1.6 G/DL
ALP SERPL-CCNC: 107 U/L (ref 39–117)
ALT SERPL-CCNC: 52 U/L (ref 1–41)
AST SERPL-CCNC: 24 U/L (ref 1–40)
BASOPHILS # BLD AUTO: 0.08 10*3/MM3 (ref 0–0.2)
BASOPHILS NFR BLD AUTO: 1.1 % (ref 0–1.5)
BILIRUB SERPL-MCNC: 0.2 MG/DL (ref 0–1.2)
BUN SERPL-MCNC: 12 MG/DL (ref 6–20)
BUN/CREAT SERPL: 11.8 (ref 7–25)
CALCIUM SERPL-MCNC: 9.8 MG/DL (ref 8.6–10.5)
CHLORIDE SERPL-SCNC: 106 MMOL/L (ref 98–107)
CHOLEST SERPL-MCNC: 130 MG/DL (ref 0–200)
CK SERPL-CCNC: 107 U/L (ref 20–200)
CO2 SERPL-SCNC: 24.4 MMOL/L (ref 22–29)
CREAT SERPL-MCNC: 1.02 MG/DL (ref 0.76–1.27)
CREAT UR-MCNC: 154.5 MG/DL
EGFRCR SERPLBLD CKD-EPI 2021: 86.3 ML/MIN/1.73
EOSINOPHIL # BLD AUTO: 0.35 10*3/MM3 (ref 0–0.4)
EOSINOPHIL NFR BLD AUTO: 5 % (ref 0.3–6.2)
ERYTHROCYTE [DISTWIDTH] IN BLOOD BY AUTOMATED COUNT: 13.5 % (ref 12.3–15.4)
GLOBULIN SER CALC-MCNC: 2.6 GM/DL
GLUCOSE SERPL-MCNC: 148 MG/DL (ref 65–99)
HBA1C MFR BLD: 7.8 % (ref 4.8–5.6)
HCT VFR BLD AUTO: 42.8 % (ref 37.5–51)
HCV IGG SERPL QL IA: NON REACTIVE
HDLC SERPL-MCNC: 39 MG/DL (ref 40–60)
HGB BLD-MCNC: 14.2 G/DL (ref 13–17.7)
IMM GRANULOCYTES # BLD AUTO: 0.12 10*3/MM3 (ref 0–0.05)
IMM GRANULOCYTES NFR BLD AUTO: 1.7 % (ref 0–0.5)
LDLC SERPL CALC-MCNC: 62 MG/DL (ref 0–100)
LYMPHOCYTES # BLD AUTO: 2.32 10*3/MM3 (ref 0.7–3.1)
LYMPHOCYTES NFR BLD AUTO: 32.9 % (ref 19.6–45.3)
MCH RBC QN AUTO: 27.2 PG (ref 26.6–33)
MCHC RBC AUTO-ENTMCNC: 33.2 G/DL (ref 31.5–35.7)
MCV RBC AUTO: 82 FL (ref 79–97)
MICROALBUMIN UR-MCNC: 14.2 UG/ML
MONOCYTES # BLD AUTO: 0.77 10*3/MM3 (ref 0.1–0.9)
MONOCYTES NFR BLD AUTO: 10.9 % (ref 5–12)
NEUTROPHILS # BLD AUTO: 3.41 10*3/MM3 (ref 1.7–7)
NEUTROPHILS NFR BLD AUTO: 48.4 % (ref 42.7–76)
NRBC BLD AUTO-RTO: 0 /100 WBC (ref 0–0.2)
PLATELET # BLD AUTO: 381 10*3/MM3 (ref 140–450)
POTASSIUM SERPL-SCNC: 5.2 MMOL/L (ref 3.5–5.2)
PROT SERPL-MCNC: 6.7 G/DL (ref 6–8.5)
PSA SERPL-MCNC: 1.13 NG/ML (ref 0–4)
RBC # BLD AUTO: 5.22 10*6/MM3 (ref 4.14–5.8)
SODIUM SERPL-SCNC: 143 MMOL/L (ref 136–145)
TRIGL SERPL-MCNC: 170 MG/DL (ref 0–150)
TSH SERPL DL<=0.005 MIU/L-ACNC: 2.41 UIU/ML (ref 0.27–4.2)
VLDLC SERPL CALC-MCNC: 29 MG/DL (ref 5–40)
WBC # BLD AUTO: 7.05 10*3/MM3 (ref 3.4–10.8)

## 2023-08-07 ENCOUNTER — OFFICE VISIT (OUTPATIENT)
Dept: FAMILY MEDICINE CLINIC | Facility: CLINIC | Age: 58
End: 2023-08-07
Payer: COMMERCIAL

## 2023-08-07 VITALS
BODY MASS INDEX: 41.77 KG/M2 | RESPIRATION RATE: 16 BRPM | DIASTOLIC BLOOD PRESSURE: 77 MMHG | SYSTOLIC BLOOD PRESSURE: 130 MMHG | OXYGEN SATURATION: 99 % | WEIGHT: 282 LBS | HEIGHT: 69 IN | HEART RATE: 88 BPM | TEMPERATURE: 98 F

## 2023-08-07 DIAGNOSIS — E03.9 ACQUIRED HYPOTHYROIDISM: ICD-10-CM

## 2023-08-07 DIAGNOSIS — E11.9 TYPE 2 DIABETES MELLITUS WITHOUT COMPLICATION, WITHOUT LONG-TERM CURRENT USE OF INSULIN: ICD-10-CM

## 2023-08-07 DIAGNOSIS — N52.9 VASCULOGENIC ERECTILE DYSFUNCTION, UNSPECIFIED VASCULOGENIC ERECTILE DYSFUNCTION TYPE: ICD-10-CM

## 2023-08-07 DIAGNOSIS — R35.1 BPH ASSOCIATED WITH NOCTURIA: ICD-10-CM

## 2023-08-07 DIAGNOSIS — N40.1 BPH ASSOCIATED WITH NOCTURIA: ICD-10-CM

## 2023-08-07 DIAGNOSIS — Z00.00 ENCOUNTER FOR WELLNESS EXAMINATION IN ADULT: Primary | ICD-10-CM

## 2023-08-07 DIAGNOSIS — I10 ESSENTIAL HYPERTENSION: ICD-10-CM

## 2023-08-07 DIAGNOSIS — E78.49 OTHER HYPERLIPIDEMIA: ICD-10-CM

## 2023-08-07 DIAGNOSIS — E66.01 CLASS 3 SEVERE OBESITY DUE TO EXCESS CALORIES WITH SERIOUS COMORBIDITY AND BODY MASS INDEX (BMI) OF 40.0 TO 44.9 IN ADULT: ICD-10-CM

## 2023-08-07 PROBLEM — S83.241A TEAR OF MEDIAL MENISCUS OF RIGHT KNEE, CURRENT: Status: RESOLVED | Noted: 2017-06-29 | Resolved: 2023-08-07

## 2023-08-07 PROBLEM — G89.29 CHRONIC PAIN OF RIGHT KNEE: Status: RESOLVED | Noted: 2017-05-11 | Resolved: 2023-08-07

## 2023-08-07 PROBLEM — M17.11 PRIMARY OSTEOARTHRITIS OF RIGHT KNEE: Status: RESOLVED | Noted: 2020-03-17 | Resolved: 2023-08-07

## 2023-08-07 PROBLEM — M25.561 CHRONIC PAIN OF RIGHT KNEE: Status: RESOLVED | Noted: 2017-05-11 | Resolved: 2023-08-07

## 2023-08-07 PROBLEM — E55.9 VITAMIN D DEFICIENCY: Status: RESOLVED | Noted: 2022-02-03 | Resolved: 2023-08-07

## 2023-08-07 PROCEDURE — 99213 OFFICE O/P EST LOW 20 MIN: CPT | Performed by: FAMILY MEDICINE

## 2023-08-07 PROCEDURE — 99396 PREV VISIT EST AGE 40-64: CPT | Performed by: FAMILY MEDICINE

## 2023-08-07 RX ORDER — IRBESARTAN 300 MG/1
300 TABLET ORAL DAILY
Qty: 30 TABLET | Refills: 8 | Status: SHIPPED | OUTPATIENT
Start: 2023-08-07 | End: 2024-05-03

## 2023-08-07 RX ORDER — AMLODIPINE BESYLATE 10 MG/1
10 TABLET ORAL DAILY
Qty: 30 TABLET | Refills: 8 | Status: SHIPPED | OUTPATIENT
Start: 2023-08-07 | End: 2024-05-03

## 2023-08-07 RX ORDER — PIOGLITAZONEHYDROCHLORIDE 45 MG/1
45 TABLET ORAL DAILY
Qty: 30 TABLET | Refills: 8 | Status: SHIPPED | OUTPATIENT
Start: 2023-08-07 | End: 2024-05-03

## 2023-08-07 RX ORDER — ATORVASTATIN CALCIUM 40 MG/1
40 TABLET, FILM COATED ORAL DAILY
Qty: 30 TABLET | Refills: 8 | Status: SHIPPED | OUTPATIENT
Start: 2023-08-07 | End: 2024-05-03

## 2023-08-07 RX ORDER — METFORMIN HYDROCHLORIDE 500 MG/1
2000 TABLET, EXTENDED RELEASE ORAL
Qty: 120 TABLET | Refills: 8 | Status: SHIPPED | OUTPATIENT
Start: 2023-08-07 | End: 2024-05-03

## 2023-08-07 RX ORDER — LEVOTHYROXINE SODIUM 175 UG/1
175 TABLET ORAL
Qty: 30 TABLET | Refills: 8 | Status: SHIPPED | OUTPATIENT
Start: 2023-08-07 | End: 2024-05-03

## 2023-08-07 RX ORDER — EZETIMIBE 10 MG/1
10 TABLET ORAL DAILY
Qty: 30 TABLET | Refills: 8 | Status: SHIPPED | OUTPATIENT
Start: 2023-08-07 | End: 2024-05-03

## 2023-08-07 RX ORDER — ORAL SEMAGLUTIDE 7 MG/1
1 TABLET ORAL
Qty: 30 TABLET | Refills: 8 | Status: SHIPPED | OUTPATIENT
Start: 2023-09-05 | End: 2024-06-01

## 2023-08-07 NOTE — PROGRESS NOTES
"Chief Complaint  Annual Exam    Subjective        MARIN Galdamez presents to [facility] for an annual wellness exam and also to review labs and refill current medications. Overall he feels well. No medication side effects are reported. The following were discussed during today's preventative wellness exam: Nutrition, Physical activity, Healthy weight, Immunizations, and Screenings   Labs show mild worsening long-term diabetic control.  No side effects with Rybelsus 3 mg.  Review of Systems   Constitutional:  Negative for fatigue.   HENT:  Positive for congestion (nasal) and sneezing.    Eyes: Negative.    Respiratory:  Negative for shortness of breath.    Cardiovascular:  Negative for chest pain and palpitations.   Gastrointestinal: Negative.    Genitourinary:  Negative for difficulty urinating.   Musculoskeletal:  Positive for arthralgias. Negative for back pain.   Skin:  Negative for rash.   Neurological:  Negative for dizziness.   Psychiatric/Behavioral:  Negative for dysphoric mood. The patient is not nervous/anxious.    All other systems reviewed and are negative.     Objective   Vital Signs:   Vitals:    08/07/23 0946   BP: 130/77   BP Location: Left arm   Patient Position: Sitting   Pulse: 88   Resp: 16   Temp: 98 øF (36.7 øC)   TempSrc: Oral   SpO2: 99%   Weight: 128 kg (282 lb)   Height: 175.3 cm (69\")                  Physical Exam  Constitutional:       Appearance: Normal appearance. He is well-developed. He is obese.   HENT:      Head: Normocephalic.      Right Ear: Hearing, tympanic membrane and external ear normal.      Left Ear: Hearing, tympanic membrane and external ear normal.   Eyes:      General: Lids are normal.      Conjunctiva/sclera: Conjunctivae normal.      Pupils: Pupils are equal, round, and reactive to light.      Funduscopic exam:     Right eye: No AV nicking or papilledema.         Left eye: No AV nicking or papilledema.   Neck:      Thyroid: No thyroid mass " or thyromegaly.      Vascular: No carotid bruit or JVD.      Trachea: Trachea normal.   Cardiovascular:      Rate and Rhythm: Normal rate and regular rhythm.      Pulses: Normal pulses.      Heart sounds: Normal heart sounds. No murmur heard.  Pulmonary:      Effort: Pulmonary effort is normal.      Breath sounds: Normal breath sounds.   Abdominal:      General: Bowel sounds are normal.      Palpations: Abdomen is soft.      Tenderness: There is no abdominal tenderness.   Musculoskeletal:         General: Normal range of motion.      Cervical back: Normal range of motion and neck supple.      Lumbar back: No bony tenderness.   Lymphadenopathy:      Cervical: No cervical adenopathy.      Upper Body:      Right upper body: No supraclavicular adenopathy.      Left upper body: No supraclavicular adenopathy.   Skin:     General: Skin is warm and dry.      Findings: No rash.      Nails: There is no clubbing.   Neurological:      Mental Status: He is alert and oriented to person, place, and time.      Cranial Nerves: No cranial nerve deficit.      Deep Tendon Reflexes:      Reflex Scores:       Patellar reflexes are 2+ on the right side and 2+ on the left side.  Psychiatric:         Speech: Speech normal.         Behavior: Behavior normal.         Thought Content: Thought content normal.         Judgment: Judgment normal.        Result Review :     The following data was reviewed by: Elliott Lopez MD on 08/07/2023:  PSA DIAGNOSTIC (08/01/2023 08:39)  Hepatitis C antibody (08/01/2023 08:39)  CK (08/01/2023 08:39)  Microalbumin / Creatinine Urine Ratio - Urine, Clean Catch (08/01/2023 08:39)  Vitamin D,25-Hydroxy (08/01/2023 08:39)  Hemoglobin A1c (08/01/2023 08:39)  TSH (08/01/2023 08:39)  CBC and Differential (08/01/2023 08:39)  Lipid panel (08/01/2023 08:39)  Comprehensive metabolic panel (08/01/2023 08:39)         Assessment and Plan    Diagnoses and all orders for this visit:    1. Adult Wellness Visit  (Primary)  Comments:  Patient is due for Shingrix vaccine.  Eye exam for diabetes is pending.  Recommendations for exercise and weight loss.    2. Essential hypertension  Assessment & Plan:  Condition is stable. The current medical regimen is effective;  continue present plan and medications.    Orders:  -     amLODIPine (NORVASC) 10 MG tablet; Take 1 tablet by mouth Daily for 270 days.  Dispense: 30 tablet; Refill: 8  -     irbesartan (AVAPRO) 300 MG tablet; Take 1 tablet by mouth Daily for 270 days.  Dispense: 30 tablet; Refill: 8  -     Comprehensive Metabolic Panel; Future  -     CBC & Differential; Future    3. Other hyperlipidemia  Assessment & Plan:  Condition is stable. The current medical regimen is effective;  continue present plan and medications.    Orders:  -     atorvastatin (LIPITOR) 40 MG tablet; Take 1 tablet by mouth Daily for 270 days.  Dispense: 30 tablet; Refill: 8  -     ezetimibe (ZETIA) 10 MG tablet; Take 1 tablet by mouth Daily for 270 days.  Dispense: 30 tablet; Refill: 8  -     Lipid Panel; Future  -     CK; Future    4. Acquired hypothyroidism  Assessment & Plan:  The current medical regimen is effective;  continue present plan and medications.        Orders:  -     levothyroxine (SYNTHROID, LEVOTHROID) 175 MCG tablet; Take 1 tablet by mouth Every Morning for 270 days.  Dispense: 30 tablet; Refill: 8  -     TSH; Future    5. Type 2 diabetes mellitus without complication, without long-term current use of insulin  Assessment & Plan:  Mild worsening diabetic control.  Increase Rybelsus 7 mg a.m. dosing.  Recheck at our next visit.    Orders:  -     metFORMIN ER (GLUCOPHAGE-XR) 500 MG 24 hr tablet; Take 4 tablets by mouth Daily With Breakfast for 270 days.  Dispense: 120 tablet; Refill: 8  -     pioglitazone (Actos) 45 MG tablet; Take 1 tablet by mouth Daily for 270 days.  Dispense: 30 tablet; Refill: 8  -     Rybelsus 7 MG tablet; Take 7 mg by mouth Every Morning Before Breakfast for 270  days.  Dispense: 30 tablet; Refill: 8  -     Comprehensive Metabolic Panel; Future  -     Hemoglobin A1c; Future  -     MicroAlbumin, Urine, Random - Urine, Clean Catch; Future    6. BPH associated with nocturia  Assessment & Plan:  Nocturia symptoms are stable. PSA will be monitored with annual labs.           7. Class 3 severe obesity due to excess calories with serious comorbidity and body mass index (BMI) of 40.0 to 44.9 in adult    8. Vasculogenic erectile dysfunction, unspecified vasculogenic erectile dysfunction type  Assessment & Plan:  Condition is stable. The current medical regimen is effective;  continue present plan and medications.          Follow Up   Return in about 3 months (around 11/7/2023) for recheck/refill medication.  Patient was given instructions and counseling regarding his condition or for health maintenance advice. Please see specific information pulled into the AVS if appropriate.

## 2023-08-07 NOTE — PATIENT INSTRUCTIONS
Exercising to Lose Weight  Getting regular exercise is important for everyone. It is especially important if you are overweight. Being overweight increases your risk of heart disease, stroke, diabetes, high blood pressure, and several types of cancer. Exercising, and reducing the calories you consume, can help you lose weight and improve fitness and health.  Exercise can be moderate or vigorous intensity. To lose weight, most people need to do a certain amount of moderate or vigorous-intensity exercise each week.  How can exercise affect me?  You lose weight when you exercise enough to burn more calories than you eat. Exercise also reduces body fat and builds muscle. The more muscle you have, the more calories you burn. Exercise also:  Improves mood.  Reduces stress and tension.  Improves your overall fitness, flexibility, and endurance.  Increases bone strength.  Moderate-intensity exercise    Moderate-intensity exercise is any activity that gets you moving enough to burn at least three times more energy (calories) than if you were sitting.  Examples of moderate exercise include:  Walking a mile in 15 minutes.  Doing light yard work.  Biking at an easy pace.  Most people should get at least 150 minutes of moderate-intensity exercise a week to maintain their body weight.  Vigorous-intensity exercise  Vigorous-intensity exercise is any activity that gets you moving enough to burn at least six times more calories than if you were sitting. When you exercise at this intensity, you should be working hard enough that you are not able to carry on a conversation.  Examples of vigorous exercise include:  Running.  Playing a team sport, such as football, basketball, and soccer.  Jumping rope.  Most people should get at least 75 minutes a week of vigorous exercise to maintain their body weight.  What actions can I take to lose weight?  The amount of exercise you need to lose weight depends on:  Your age.  The type of  exercise.  Any health conditions you have.  Your overall physical ability.  Talk to your health care provider about how much exercise you need and what types of activities are safe for you.  Nutrition    Make changes to your diet as told by your health care provider or diet and nutrition specialist (dietitian). This may include:  Eating fewer calories.  Eating more protein.  Eating less unhealthy fats.  Eating a diet that includes fresh fruits and vegetables, whole grains, low-fat dairy products, and lean protein.  Avoiding foods with added fat, salt, and sugar.  Drink plenty of water while you exercise to prevent dehydration or heat stroke.  Activity  Choose an activity that you enjoy and set realistic goals. Your health care provider can help you make an exercise plan that works for you.  Exercise at a moderate or vigorous intensity most days of the week.  The intensity of exercise may vary from person to person. You can tell how intense a workout is for you by paying attention to your breathing and heartbeat. Most people will notice their breathing and heartbeat get faster with more intense exercise.  Do resistance training twice each week, such as:  Push-ups.  Sit-ups.  Lifting weights.  Using resistance bands.  Getting short amounts of exercise can be just as helpful as long, structured periods of exercise. If you have trouble finding time to exercise, try doing these things as part of your daily routine:  Get up, stretch, and walk around every 30 minutes throughout the day.  Go for a walk during your lunch break.  Park your car farther away from your destination.  If you take public transportation, get off one stop early and walk the rest of the way.  Make phone calls while standing up and walking around.  Take the stairs instead of elevators or escalators.  Wear comfortable clothes and shoes with good support.  Do not exercise so much that you hurt yourself, feel dizzy, or get very short of breath.  Where to  find more information  [place] Department of Health and Human Services: www.hhs.gov  Centers for Disease Control and Prevention: www.cdc.gov  Contact a health care provider:  Before starting a new exercise program.  If you have questions or concerns about your weight.  If you have a medical problem that keeps you from exercising.  Get help right away if:  You have any of the following while exercising:  Injury.  Dizziness.  Difficulty breathing or shortness of breath that does not go away when you stop exercising.  Chest pain.  Rapid heartbeat.  These symptoms may represent a serious problem that is an emergency. Do not wait to see if the symptoms will go away. Get medical help right away. Call your local emergency services (911 in the [place] Do not drive yourself to the hospital.  Summary  Getting regular exercise is especially important if you are overweight.  Being overweight increases your risk of heart disease, stroke, diabetes, high blood pressure, and several types of cancer.  Losing weight happens when you burn more calories than you eat.  Reducing the amount of calories you eat, and getting regular moderate or vigorous exercise each week, helps you lose weight.  This information is not intended to replace advice given to you by your health care provider. Make sure you discuss any questions you have with your health care provider.  Document Revised: 02/13/2022 Document Reviewed: 02/13/2022  [employer] Patient Education c 2023 [employer]  Carbohydrate Counting for Diabetes Mellitus, Adult  Carbohydrate counting is a method of keeping track of how many carbohydrates you eat. Eating carbohydrates increases the amount of sugar (glucose) in the blood. Counting how many carbohydrates you eat improves how well you manage your blood glucose. This, in turn, helps you manage your diabetes.  Carbohydrates are measured in grams (g) per serving. It is important to know how many carbohydrates (in grams or by serving size)  you can have in each meal. This is different for every person. A dietitian can help you make a meal plan and calculate how many carbohydrates you should have at each meal and snack.  What foods contain carbohydrates?  Carbohydrates are found in the following foods:  Grains, such as breads and cereals.  Dried beans and soy products.  Starchy vegetables, such as potatoes, peas, and corn.  Fruit and fruit juices.  Milk and yogurt.  Sweets and snack foods, such as cake, cookies, candy, chips, and soft drinks.  How do I count carbohydrates in foods?  There are two ways to count carbohydrates in food. You can read food labels or learn standard serving sizes of foods. You can use either of these methods or a combination of both.  Using the Nutrition Facts label  The Nutrition Facts list is included on the labels of almost all packaged foods and beverages in the [place]. It includes:  The serving size.  Information about nutrients in each serving, including the grams of carbohydrate per serving.  To use the Nutrition Facts, decide how many servings you will have. Then, multiply the number of servings by the number of carbohydrates per serving. The resulting number is the total grams of carbohydrates that you will be having.  Learning the standard serving sizes of foods  When you eat carbohydrate foods that are not packaged or do not include Nutrition Facts on the label, you need to measure the servings in order to count the grams of carbohydrates.  Measure the foods that you will eat with a food scale or measuring cup, if needed.  Decide how many standard-size servings you will eat.  Multiply the number of servings by 15. For foods that contain carbohydrates, one serving equals 15 g of carbohydrates.  For example, if you eat 2 cups or 10 oz (300 g) of strawberries, you will have eaten 2 servings and 30 g of carbohydrates (2 servings x 15 g = 30 g).  For foods that have more than one food mixed, such as soups and  casseroles, you must count the carbohydrates in each food that is included.  The following list contains standard serving sizes of common carbohydrate-rich foods. Each of these servings has about 15 g of carbohydrates:  1 slice of bread.  1 six-inch (15 cm) tortilla.  ? cup or 2 oz (53 g) cooked rice or pasta.  « cup or 3 oz (85 g) cooked or canned, drained and rinsed beans or lentils.  « cup or 3 oz (85 g) starchy vegetable, such as peas, corn, or squash.  « cup or 4 oz (120 g) hot cereal.  « cup or 3 oz (85 g) boiled or mashed potatoes, or ¬ or 3 oz (85 g) of a large baked potato.  « cup or 4 fl oz (118 mL) fruit juice.  1 cup or 8 fl oz (237 mL) milk.  1 small or 4 oz (106 g) apple.  « or 2 oz (63 g) of a medium banana.  1 cup or 5 oz (150 g) strawberries.  3 cups or 1 oz (28.3 g) popped popcorn.  What is an example of carbohydrate counting?  To calculate the grams of carbohydrates in this sample meal, follow the steps shown below.  Sample meal  3 oz (85 g) chicken breast.  ? cup or 4 oz (106 g) brown rice.  « cup or 3 oz (85 g) corn.  1 cup or 8 fl oz (237 mL) milk.  1 cup or 5 oz (150 g) strawberries with sugar-free whipped topping.  Carbohydrate calculation  Identify the foods that contain carbohydrates:  Rice.  Corn.  Milk.  Strawberries.  Calculate how many servings you have of each food:  2 servings rice.  1 serving corn.  1 serving milk.  1 serving strawberries.  Multiply each number of servings by 15  servings rice x 15 g = 30 g.  1 serving corn x 15 g = 15 g.  1 serving milk x 15 g = 15 g.  1 serving strawberries x 15 g = 15 g.  Add together all of the amounts to find the total grams of carbohydrates eaten:  30 g + 15 g + 15 g + 15 g = 75 g of carbohydrates total.  What are tips for following this plan?  Shopping  Develop a meal plan and then make a shopping list.  Buy fresh and frozen vegetables, fresh and frozen fruit, dairy, eggs, beans, lentils, and whole grains.  Look at food labels. Choose  foods that have more fiber and less sugar.  Avoid processed foods and foods with added sugars.  Meal planning  Aim to have the same number of grams of carbohydrates at each meal and for each snack time.  Plan to have regular, balanced meals and snacks.  Where to find more information  American Diabetes Association: diabetes.org  Centers for Disease Control and Prevention: cdc.gov  Academy of Nutrition and Dietetics: eatright.org  Association of Diabetes Care & Education Specialists: diabeteseducator.org  Summary  Carbohydrate counting is a method of keeping track of how many carbohydrates you eat.  Eating carbohydrates increases the amount of sugar (glucose) in your blood.  Counting how many carbohydrates you eat improves how well you manage your blood glucose. This helps you manage your diabetes.  A dietitian can help you make a meal plan and calculate how many carbohydrates you should have at each meal and snack.  This information is not intended to replace advice given to you by your health care provider. Make sure you discuss any questions you have with your health care provider.  Document Revised: 07/21/2021 Document Reviewed: 07/21/2021  [employer] Patient Education c 2023 [employer]

## 2023-08-22 ENCOUNTER — PRIOR AUTHORIZATION (OUTPATIENT)
Dept: FAMILY MEDICINE CLINIC | Facility: CLINIC | Age: 58
End: 2023-08-22
Payer: COMMERCIAL

## 2023-08-25 ENCOUNTER — PRIOR AUTHORIZATION (OUTPATIENT)
Dept: FAMILY MEDICINE CLINIC | Facility: CLINIC | Age: 58
End: 2023-08-25
Payer: COMMERCIAL

## 2023-08-25 NOTE — TELEPHONE ENCOUNTER
[employer] APPROVED  Request Reference Number: [id number]. FLAKITOS TAB 3MG is approved through 08/22/2024. Your patient may now fill this prescription and it will be covered.

## 2023-09-27 ENCOUNTER — TELEPHONE (OUTPATIENT)
Dept: FAMILY MEDICINE CLINIC | Facility: CLINIC | Age: 58
End: 2023-09-27

## 2023-09-27 NOTE — TELEPHONE ENCOUNTER
[facility] staff attempted to follow warm transfer process and was unsuccessful     Caller: Sarah Sauceda    Relationship to patient: Emergency Contact    Best call back number: [phone]     Patient is needing: SHINGLES VACCINE

## 2023-11-13 ENCOUNTER — TELEPHONE (OUTPATIENT)
Dept: FAMILY MEDICINE CLINIC | Facility: CLINIC | Age: 58
End: 2023-11-13

## 2023-11-13 NOTE — TELEPHONE ENCOUNTER
Caller: Sarah Sauceda    Relationship to patient: Emergency Contact    Best call back number: [phone]     Type of visit: LABS    Requested date: BEFORE 11/21 OFFICE VISIT    Additional notes:ATTEMPTED TO WARM TRANSFER

## 2023-11-27 ENCOUNTER — OFFICE VISIT (OUTPATIENT)
Dept: FAMILY MEDICINE CLINIC | Facility: CLINIC | Age: 58
End: 2023-11-27
Payer: COMMERCIAL

## 2023-11-27 VITALS
SYSTOLIC BLOOD PRESSURE: 134 MMHG | HEIGHT: 69 IN | OXYGEN SATURATION: 98 % | TEMPERATURE: 98.1 F | RESPIRATION RATE: 16 BRPM | HEART RATE: 71 BPM | WEIGHT: 290.6 LBS | DIASTOLIC BLOOD PRESSURE: 85 MMHG | BODY MASS INDEX: 43.04 KG/M2

## 2023-11-27 DIAGNOSIS — E11.9 TYPE 2 DIABETES MELLITUS WITHOUT COMPLICATION, WITHOUT LONG-TERM CURRENT USE OF INSULIN: ICD-10-CM

## 2023-11-27 DIAGNOSIS — E83.52 SERUM CALCIUM ELEVATED: ICD-10-CM

## 2023-11-27 DIAGNOSIS — R35.1 BPH ASSOCIATED WITH NOCTURIA: ICD-10-CM

## 2023-11-27 DIAGNOSIS — E03.9 ACQUIRED HYPOTHYROIDISM: ICD-10-CM

## 2023-11-27 DIAGNOSIS — E78.49 OTHER HYPERLIPIDEMIA: ICD-10-CM

## 2023-11-27 DIAGNOSIS — I10 ESSENTIAL HYPERTENSION: Primary | ICD-10-CM

## 2023-11-27 DIAGNOSIS — N52.9 VASCULOGENIC ERECTILE DYSFUNCTION, UNSPECIFIED VASCULOGENIC ERECTILE DYSFUNCTION TYPE: ICD-10-CM

## 2023-11-27 DIAGNOSIS — N40.1 BPH ASSOCIATED WITH NOCTURIA: ICD-10-CM

## 2023-11-27 RX ORDER — PIOGLITAZONEHYDROCHLORIDE 45 MG/1
45 TABLET ORAL DAILY
Qty: 30 TABLET | Refills: 8 | Status: SHIPPED | OUTPATIENT
Start: 2023-11-27 | End: 2024-08-23

## 2023-11-27 RX ORDER — METFORMIN HYDROCHLORIDE 500 MG/1
2000 TABLET, EXTENDED RELEASE ORAL
Qty: 360 TABLET | Refills: 2 | Status: SHIPPED | OUTPATIENT
Start: 2023-11-27 | End: 2024-08-23

## 2023-11-27 RX ORDER — SILDENAFIL CITRATE 20 MG/1
TABLET ORAL
Qty: 50 TABLET | Refills: 11 | Status: SHIPPED | OUTPATIENT
Start: 2023-11-27

## 2023-11-27 RX ORDER — ORAL SEMAGLUTIDE 14 MG/1
1 TABLET ORAL
Qty: 30 TABLET | Refills: 8 | Status: SHIPPED | OUTPATIENT
Start: 2023-11-27 | End: 2024-08-23

## 2023-11-27 RX ORDER — LEVOTHYROXINE SODIUM 175 UG/1
175 TABLET ORAL
Qty: 30 TABLET | Refills: 8 | Status: SHIPPED | OUTPATIENT
Start: 2023-11-27 | End: 2024-08-23

## 2023-11-27 RX ORDER — EZETIMIBE 10 MG/1
10 TABLET ORAL DAILY
Qty: 30 TABLET | Refills: 8 | Status: SHIPPED | OUTPATIENT
Start: 2023-11-27 | End: 2024-08-23

## 2023-11-27 RX ORDER — AMLODIPINE BESYLATE 10 MG/1
10 TABLET ORAL DAILY
Qty: 30 TABLET | Refills: 8 | Status: SHIPPED | OUTPATIENT
Start: 2023-11-27 | End: 2024-08-23

## 2023-11-27 RX ORDER — IRBESARTAN 300 MG/1
300 TABLET ORAL DAILY
Qty: 30 TABLET | Refills: 8 | Status: SHIPPED | OUTPATIENT
Start: 2023-11-27 | End: 2024-08-23

## 2023-11-27 RX ORDER — ATORVASTATIN CALCIUM 40 MG/1
40 TABLET, FILM COATED ORAL DAILY
Qty: 30 TABLET | Refills: 8 | Status: SHIPPED | OUTPATIENT
Start: 2023-11-27 | End: 2024-08-23

## 2023-11-27 NOTE — ASSESSMENT & PLAN NOTE
Diabetes is improving but still above goal.  Increase Rybelsus to 14 mg a.m. dosing.  Recheck labs in 6 months.  Patient will adhere to low carbohydrate diet and crease exercise with goal of weight loss.

## 2023-11-27 NOTE — PROGRESS NOTES
"Chief Complaint  Follow-up and Hypertension (3 month follow up )    Subjective        Hypertension       Rudolph presents to [facility] for lab review and to refill current medications. Overall he feels well. No medication side effects are reported.  Labs were reviewed and are seen below.  Interval improvement in long-term hemoglobin A1c noted but still above goal.          Objective   Vital Signs:   Vitals:    11/27/23 0921   BP: 134/85   BP Location: Left arm   Patient Position: Sitting   Pulse: 71   Resp: 16   Temp: 98.1 °F (36.7 °C)   TempSrc: Oral   SpO2: 98%   Weight: 132 kg (290 lb 9.6 oz)   Height: 175.3 cm (69\")            11/27/2023     9:23 AM   PHQ-2/PHQ-9 Depression Screening   Little Interest or Pleasure in Doing Things 0-->not at all   Feeling Down, Depressed or Hopeless 0-->not at all   PHQ-9: Brief Depression Severity Measure Score 0                 Physical Exam  Vitals reviewed.   Constitutional:       General: He is not in acute distress.     Appearance: He is morbidly obese.   Eyes:      General: Lids are normal.      Conjunctiva/sclera: Conjunctivae normal.   Neck:      Vascular: No carotid bruit.      Trachea: No tracheal deviation.   Cardiovascular:      Rate and Rhythm: Normal rate and regular rhythm.      Heart sounds: Normal heart sounds. No murmur heard.  Pulmonary:      Effort: Pulmonary effort is normal.      Breath sounds: Normal breath sounds.   Skin:     General: Skin is warm and dry.   Neurological:      Mental Status: He is alert. He is not disoriented.   Psychiatric:         Speech: Speech normal.         Behavior: Behavior normal. Behavior is cooperative.          Result Review :                Assessment and Plan    Diagnoses and all orders for this visit:    1. Essential hypertension (Primary)  Assessment & Plan:  Condition is stable. The current medical regimen is effective;  continue present plan and medications.    Orders:  -     amLODIPine " (NORVASC) 10 MG tablet; Take 1 tablet by mouth Daily for 270 days.  Dispense: 30 tablet; Refill: 8  -     irbesartan (AVAPRO) 300 MG tablet; Take 1 tablet by mouth Daily for 270 days.  Dispense: 30 tablet; Refill: 8  -     Comprehensive Metabolic Panel; Future  -     CBC & Differential; Future    2. Other hyperlipidemia  Assessment & Plan:  Interval improvement in lipid panel noted.  Continued fatty liver component.  No change in medications.    Orders:  -     atorvastatin (LIPITOR) 40 MG tablet; Take 1 tablet by mouth Daily for 270 days.  Dispense: 30 tablet; Refill: 8  -     ezetimibe (ZETIA) 10 MG tablet; Take 1 tablet by mouth Daily for 270 days.  Dispense: 30 tablet; Refill: 8  -     Lipid Panel With LDL / HDL Ratio; Future  -     CK; Future    3. Acquired hypothyroidism  Assessment & Plan:  TSH above goal.  Will follow along with serial labs.  No change in therapy today.    Orders:  -     levothyroxine (SYNTHROID, LEVOTHROID) 175 MCG tablet; Take 1 tablet by mouth Every Morning for 270 days.  Dispense: 30 tablet; Refill: 8  -     TSH+Free T4; Future    4. Type 2 diabetes mellitus without complication, without long-term current use of insulin  Assessment & Plan:  Diabetes is improving but still above goal.  Increase Rybelsus to 14 mg a.m. dosing.  Recheck labs in 6 months.  Patient will adhere to low carbohydrate diet and crease exercise with goal of weight loss.    Orders:  -     metFORMIN ER (GLUCOPHAGE-XR) 500 MG 24 hr tablet; Take 4 tablets by mouth Daily With Breakfast for 270 days.  Dispense: 360 tablet; Refill: 2  -     pioglitazone (Actos) 45 MG tablet; Take 1 tablet by mouth Daily for 270 days.  Dispense: 30 tablet; Refill: 8  -     Rybelsus 14 MG tablet; Take 1 tablet by mouth Every Morning Before Breakfast for 270 days.  Dispense: 30 tablet; Refill: 8  -     Comprehensive Metabolic Panel; Future  -     Hemoglobin A1c; Future  -     MicroAlbumin, Urine, Random - Urine, Clean Catch; Future    5.  Vasculogenic erectile dysfunction, unspecified vasculogenic erectile dysfunction type  -     sildenafil (REVATIO) 20 MG tablet; Take 3-5 tablets  po 60 minutes prior to sexual activity with a light meal as needed  Dispense: 50 tablet; Refill: 11    6. BPH associated with nocturia  Assessment & Plan:  Nocturia symptoms are stable. PSA will be monitored with annual labs.    Orders:  -     PSA DIAGNOSTIC; Future    7. Serum calcium elevated  -     Calcium, Ionized; Future        Follow Up   Return in about 6 months (around 5/27/2024) for recheck/refill medication.  Patient was given instructions and counseling regarding his condition or for health maintenance advice. Please see specific information pulled into the AVS if appropriate.

## 2023-11-27 NOTE — ASSESSMENT & PLAN NOTE
Interval improvement in lipid panel noted.  Continued fatty liver component.  No change in medications.

## 2023-11-27 NOTE — PATIENT INSTRUCTIONS
Exercising to Lose Weight  Getting regular exercise is important for everyone. It is especially important if you are overweight. Being overweight increases your risk of heart disease, stroke, diabetes, high blood pressure, and several types of cancer. Exercising, and reducing the calories you consume, can help you lose weight and improve fitness and health.  Exercise can be moderate or vigorous intensity. To lose weight, most people need to do a certain amount of moderate or vigorous-intensity exercise each week.  How can exercise affect me?  You lose weight when you exercise enough to burn more calories than you eat. Exercise also reduces body fat and builds muscle. The more muscle you have, the more calories you burn. Exercise also:  Improves mood.  Reduces stress and tension.  Improves your overall fitness, flexibility, and endurance.  Increases bone strength.  Moderate-intensity exercise    Moderate-intensity exercise is any activity that gets you moving enough to burn at least three times more energy (calories) than if you were sitting.  Examples of moderate exercise include:  Walking a mile in 15 minutes.  Doing light yard work.  Biking at an easy pace.  Most people should get at least 150 minutes of moderate-intensity exercise a week to maintain their body weight.  Vigorous-intensity exercise  Vigorous-intensity exercise is any activity that gets you moving enough to burn at least six times more calories than if you were sitting. When you exercise at this intensity, you should be working hard enough that you are not able to carry on a conversation.  Examples of vigorous exercise include:  Running.  Playing a team sport, such as football, basketball, and soccer.  Jumping rope.  Most people should get at least 75 minutes a week of vigorous exercise to maintain their body weight.  What actions can I take to lose weight?  The amount of exercise you need to lose weight depends on:  Your age.  The type of  exercise.  Any health conditions you have.  Your overall physical ability.  Talk to your health care provider about how much exercise you need and what types of activities are safe for you.  Nutrition    Make changes to your diet as told by your health care provider or diet and nutrition specialist (dietitian). This may include:  Eating fewer calories.  Eating more protein.  Eating less unhealthy fats.  Eating a diet that includes fresh fruits and vegetables, whole grains, low-fat dairy products, and lean protein.  Avoiding foods with added fat, salt, and sugar.  Drink plenty of water while you exercise to prevent dehydration or heat stroke.  Activity  Choose an activity that you enjoy and set realistic goals. Your health care provider can help you make an exercise plan that works for you.  Exercise at a moderate or vigorous intensity most days of the week.  The intensity of exercise may vary from person to person. You can tell how intense a workout is for you by paying attention to your breathing and heartbeat. Most people will notice their breathing and heartbeat get faster with more intense exercise.  Do resistance training twice each week, such as:  Push-ups.  Sit-ups.  Lifting weights.  Using resistance bands.  Getting short amounts of exercise can be just as helpful as long, structured periods of exercise. If you have trouble finding time to exercise, try doing these things as part of your daily routine:  Get up, stretch, and walk around every 30 minutes throughout the day.  Go for a walk during your lunch break.  Park your car farther away from your destination.  If you take public transportation, get off one stop early and walk the rest of the way.  Make phone calls while standing up and walking around.  Take the stairs instead of elevators or escalators.  Wear comfortable clothes and shoes with good support.  Do not exercise so much that you hurt yourself, feel dizzy, or get very short of breath.  Where to  find more information  [place] Department of Health and Human Services: www.hhs.gov  Centers for Disease Control and Prevention: www.cdc.gov  Contact a health care provider:  Before starting a new exercise program.  If you have questions or concerns about your weight.  If you have a medical problem that keeps you from exercising.  Get help right away if:  You have any of the following while exercising:  Injury.  Dizziness.  Difficulty breathing or shortness of breath that does not go away when you stop exercising.  Chest pain.  Rapid heartbeat.  These symptoms may represent a serious problem that is an emergency. Do not wait to see if the symptoms will go away. Get medical help right away. Call your local emergency services (911 in the [place] Do not drive yourself to the hospital.  Summary  Getting regular exercise is especially important if you are overweight.  Being overweight increases your risk of heart disease, stroke, diabetes, high blood pressure, and several types of cancer.  Losing weight happens when you burn more calories than you eat.  Reducing the amount of calories you eat, and getting regular moderate or vigorous exercise each week, helps you lose weight.  This information is not intended to replace advice given to you by your health care provider. Make sure you discuss any questions you have with your health care provider.  Document Revised: 02/13/2022 Document Reviewed: 02/13/2022  [employer] Patient Education © 2023 [employer]  Carbohydrate Counting for Diabetes Mellitus, Adult  Carbohydrate counting is a method of keeping track of how many carbohydrates you eat. Eating carbohydrates increases the amount of sugar (glucose) in the blood. Counting how many carbohydrates you eat improves how well you manage your blood glucose. This, in turn, helps you manage your diabetes.  Carbohydrates are measured in grams (g) per serving. It is important to know how many carbohydrates (in grams or by serving size)  you can have in each meal. This is different for every person. A dietitian can help you make a meal plan and calculate how many carbohydrates you should have at each meal and snack.  What foods contain carbohydrates?  Carbohydrates are found in the following foods:  Grains, such as breads and cereals.  Dried beans and soy products.  Starchy vegetables, such as potatoes, peas, and corn.  Fruit and fruit juices.  Milk and yogurt.  Sweets and snack foods, such as cake, cookies, candy, chips, and soft drinks.  How do I count carbohydrates in foods?  There are two ways to count carbohydrates in food. You can read food labels or learn standard serving sizes of foods. You can use either of these methods or a combination of both.  Using the Nutrition Facts label  The Nutrition Facts list is included on the labels of almost all packaged foods and beverages in the [place]. It includes:  The serving size.  Information about nutrients in each serving, including the grams of carbohydrate per serving.  To use the Nutrition Facts, decide how many servings you will have. Then, multiply the number of servings by the number of carbohydrates per serving. The resulting number is the total grams of carbohydrates that you will be having.  Learning the standard serving sizes of foods  When you eat carbohydrate foods that are not packaged or do not include Nutrition Facts on the label, you need to measure the servings in order to count the grams of carbohydrates.  Measure the foods that you will eat with a food scale or measuring cup, if needed.  Decide how many standard-size servings you will eat.  Multiply the number of servings by 15. For foods that contain carbohydrates, one serving equals 15 g of carbohydrates.  For example, if you eat 2 cups or 10 oz (300 g) of strawberries, you will have eaten 2 servings and 30 g of carbohydrates (2 servings x 15 g = 30 g).  For foods that have more than one food mixed, such as soups and  casseroles, you must count the carbohydrates in each food that is included.  The following list contains standard serving sizes of common carbohydrate-rich foods. Each of these servings has about 15 g of carbohydrates:  1 slice of bread.  1 six-inch (15 cm) tortilla.  ? cup or 2 oz (53 g) cooked rice or pasta.  ½ cup or 3 oz (85 g) cooked or canned, drained and rinsed beans or lentils.  ½ cup or 3 oz (85 g) starchy vegetable, such as peas, corn, or squash.  ½ cup or 4 oz (120 g) hot cereal.  ½ cup or 3 oz (85 g) boiled or mashed potatoes, or ¼ or 3 oz (85 g) of a large baked potato.  ½ cup or 4 fl oz (118 mL) fruit juice.  1 cup or 8 fl oz (237 mL) milk.  1 small or 4 oz (106 g) apple.  ½ or 2 oz (63 g) of a medium banana.  1 cup or 5 oz (150 g) strawberries.  3 cups or 1 oz (28.3 g) popped popcorn.  What is an example of carbohydrate counting?  To calculate the grams of carbohydrates in this sample meal, follow the steps shown below.  Sample meal  3 oz (85 g) chicken breast.  ? cup or 4 oz (106 g) brown rice.  ½ cup or 3 oz (85 g) corn.  1 cup or 8 fl oz (237 mL) milk.  1 cup or 5 oz (150 g) strawberries with sugar-free whipped topping.  Carbohydrate calculation  Identify the foods that contain carbohydrates:  Rice.  Corn.  Milk.  Strawberries.  Calculate how many servings you have of each food:  2 servings rice.  1 serving corn.  1 serving milk.  1 serving strawberries.  Multiply each number of servings by 15  servings rice x 15 g = 30 g.  1 serving corn x 15 g = 15 g.  1 serving milk x 15 g = 15 g.  1 serving strawberries x 15 g = 15 g.  Add together all of the amounts to find the total grams of carbohydrates eaten:  30 g + 15 g + 15 g + 15 g = 75 g of carbohydrates total.  What are tips for following this plan?  Shopping  Develop a meal plan and then make a shopping list.  Buy fresh and frozen vegetables, fresh and frozen fruit, dairy, eggs, beans, lentils, and whole grains.  Look at food labels. Choose  foods that have more fiber and less sugar.  Avoid processed foods and foods with added sugars.  Meal planning  Aim to have the same number of grams of carbohydrates at each meal and for each snack time.  Plan to have regular, balanced meals and snacks.  Where to find more information  American Diabetes Association: diabetes.org  Centers for Disease Control and Prevention: cdc.gov  Academy of Nutrition and Dietetics: eatright.org  Association of Diabetes Care & Education Specialists: diabeteseducator.org  Summary  Carbohydrate counting is a method of keeping track of how many carbohydrates you eat.  Eating carbohydrates increases the amount of sugar (glucose) in your blood.  Counting how many carbohydrates you eat improves how well you manage your blood glucose. This helps you manage your diabetes.  A dietitian can help you make a meal plan and calculate how many carbohydrates you should have at each meal and snack.  This information is not intended to replace advice given to you by your health care provider. Make sure you discuss any questions you have with your health care provider.  Document Revised: 07/21/2021 Document Reviewed: 07/21/2021  [employer] Patient Education © 2023 [employer]    You are due for Shingrix vaccination series to prevent Shingles. Please get the immunization at your local pharmacy. It is more effective than the old Zostavax vaccine and is recommended even if you have had the Zostavax vaccine in the past. For more information, please look at the website below:  https://www.cdc.gov/vaccines/vpd/shingles/public/shingrix/index.html    Shingrix Vaccine Injection  What is this medication?  ZOSTER VACCINE (ZOS ter vak SEEN) reduces the risk of herpes zoster (shingles). It does not treat shingles. It is still possible to get shingles after receiving the vaccine, but the symptoms may be less severe or not last as long. It works by helping your immune system learn how to fight off a future  infection.  This medicine may be used for other purposes; ask your health care provider or pharmacist if you have questions.  COMMON BRAND NAME(S): SHINGRIX  What should I tell my care team before I take this medication?  They need to know if you have any of these conditions:  Cancer  Immune system problems  An unusual or allergic reaction to Zoster vaccine, other medications, foods, dyes, or preservatives  Pregnant or trying to get pregnant  Breastfeeding  How should I use this medication?  This vaccine is injected into a muscle. It is given by your care team.  This vaccine requires 2 doses to get the full benefit. Set a reminder for when your next dose is due.  A copy of Vaccine Information Statements will be given before each vaccination. Be sure to read this information carefully each time. This sheet may change often.  Talk to your care team about the use of this vaccine in children. This vaccine is not approved for use in children.  Overdosage: If you think you have taken too much of this medicine contact a poison control center or emergency room at once.  NOTE: This medicine is only for you. Do not share this medicine with others.  What if I miss a dose?  Keep appointments for follow-up (booster) doses. It is important not to miss your dose. Call your care team if you are unable to keep an appointment.  What may interact with this medication?  Medications that suppress your immune system  Medications to treat cancer  Steroid medications, such as prednisone or cortisone  This list may not describe all possible interactions. Give your health care provider a list of all the medicines, herbs, non-prescription drugs, or dietary supplements you use. Also tell them if you smoke, drink alcohol, or use illegal drugs. Some items may interact with your medicine.  What should I watch for while using this medication?  Visit your care team regularly.  This vaccine, like all vaccines, may not fully protect everyone.  What  side effects may I notice from receiving this medication?  Side effects that you should report to your care team as soon as possible:  Allergic reactions--skin rash, itching, hives, swelling of the face, lips, tongue, or throat  Side effects that usually do not require medical attention (report these to your care team if they continue or are bothersome):  Chills  Fatigue  Feeling faint or lightheaded  Fever  Headache  Muscle pain  Pain, redness, or irritation at injection site  This list may not describe all possible side effects. Call your doctor for medical advice about side effects. You may report side effects to FDA at 4-528-FDA-1430.  Where should I keep my medication?  This vaccine is only given by your care team. It will not be stored at home.  NOTE: This sheet is a summary. It may not cover all possible information. If you have questions about this medicine, talk to your doctor, pharmacist, or health care provider.  © 2023 [employer] (2023-06-08 00:00:00)

## 2024-05-29 ENCOUNTER — OFFICE VISIT (OUTPATIENT)
Dept: FAMILY MEDICINE CLINIC | Facility: CLINIC | Age: 59
End: 2024-05-29
Payer: COMMERCIAL

## 2024-05-29 VITALS
BODY MASS INDEX: 39.99 KG/M2 | SYSTOLIC BLOOD PRESSURE: 126 MMHG | HEART RATE: 88 BPM | WEIGHT: 270 LBS | DIASTOLIC BLOOD PRESSURE: 74 MMHG | OXYGEN SATURATION: 99 % | HEIGHT: 69 IN

## 2024-05-29 DIAGNOSIS — E11.9 TYPE 2 DIABETES MELLITUS WITHOUT COMPLICATION, WITHOUT LONG-TERM CURRENT USE OF INSULIN: ICD-10-CM

## 2024-05-29 DIAGNOSIS — N52.9 VASCULOGENIC ERECTILE DYSFUNCTION, UNSPECIFIED VASCULOGENIC ERECTILE DYSFUNCTION TYPE: ICD-10-CM

## 2024-05-29 DIAGNOSIS — E03.9 ACQUIRED HYPOTHYROIDISM: ICD-10-CM

## 2024-05-29 DIAGNOSIS — N40.1 BPH ASSOCIATED WITH NOCTURIA: Primary | ICD-10-CM

## 2024-05-29 DIAGNOSIS — R35.1 BPH ASSOCIATED WITH NOCTURIA: Primary | ICD-10-CM

## 2024-05-29 DIAGNOSIS — E78.49 OTHER HYPERLIPIDEMIA: ICD-10-CM

## 2024-05-29 DIAGNOSIS — R35.1 NOCTURIA: ICD-10-CM

## 2024-05-29 DIAGNOSIS — I10 ESSENTIAL HYPERTENSION: ICD-10-CM

## 2024-05-29 PROCEDURE — 99214 OFFICE O/P EST MOD 30 MIN: CPT | Performed by: FAMILY MEDICINE

## 2024-05-29 RX ORDER — EZETIMIBE 10 MG/1
10 TABLET ORAL DAILY
Qty: 30 TABLET | Refills: 8 | Status: SHIPPED | OUTPATIENT
Start: 2024-05-29 | End: 2025-02-23

## 2024-05-29 RX ORDER — LEVOTHYROXINE SODIUM 175 UG/1
175 TABLET ORAL
Qty: 30 TABLET | Refills: 8 | Status: SHIPPED | OUTPATIENT
Start: 2024-05-29 | End: 2025-02-23

## 2024-05-29 RX ORDER — SILDENAFIL CITRATE 20 MG/1
TABLET ORAL
Qty: 50 TABLET | Refills: 8 | Status: SHIPPED | OUTPATIENT
Start: 2024-05-29

## 2024-05-29 RX ORDER — ATORVASTATIN CALCIUM 40 MG/1
40 TABLET, FILM COATED ORAL DAILY
Qty: 30 TABLET | Refills: 8 | Status: SHIPPED | OUTPATIENT
Start: 2024-05-29 | End: 2025-02-23

## 2024-05-29 RX ORDER — IRBESARTAN 300 MG/1
300 TABLET ORAL DAILY
Qty: 30 TABLET | Refills: 8 | Status: SHIPPED | OUTPATIENT
Start: 2024-05-29 | End: 2025-02-23

## 2024-05-29 RX ORDER — AMLODIPINE BESYLATE 10 MG/1
10 TABLET ORAL DAILY
Qty: 30 TABLET | Refills: 8 | Status: SHIPPED | OUTPATIENT
Start: 2024-05-29 | End: 2025-02-23

## 2024-05-29 RX ORDER — ORAL SEMAGLUTIDE 14 MG/1
1 TABLET ORAL
Qty: 30 TABLET | Refills: 8 | Status: SHIPPED | OUTPATIENT
Start: 2024-05-29 | End: 2025-02-23

## 2024-05-29 RX ORDER — PIOGLITAZONEHYDROCHLORIDE 45 MG/1
45 TABLET ORAL DAILY
Qty: 30 TABLET | Refills: 8 | Status: SHIPPED | OUTPATIENT
Start: 2024-05-29 | End: 2025-02-23

## 2024-05-29 RX ORDER — METFORMIN HYDROCHLORIDE 500 MG/1
2000 TABLET, EXTENDED RELEASE ORAL
Qty: 360 TABLET | Refills: 2 | Status: SHIPPED | OUTPATIENT
Start: 2024-05-29 | End: 2025-02-23

## 2024-05-29 NOTE — PROGRESS NOTES
"Chief Complaint  Follow-up (6 month), Hyperlipidemia, Hypertension, Hypothyroidism, Diabetes, xiang calcium elevated, BPH associated with nocturia, and Erectile Dysfunction    Subjective        HPI   History of Present Illness  The patient is a 58-year-old male who presents for evaluation of multiple medical concerns.    The patient experienced severe right shoulder pain last night, which disrupted his sleep. 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 Despite working all day yesterday without pain, he experienced a sharp, stabbing sensation in his shoulder before bedtime. Currently, he reports no discomfort.    The patient admits to not maintaining adequate hydration.    The patient reports nocturia, waking up 1 to 2 times weekly, and 5 to 6 times during weekends following beer consumption. He is seeking a refill of his sildenafil prescription.    The patient is experiencing leg cramps and pains and is contemplating initiating statin therapy.  No medication side effects reported          Vital Signs:   Vitals:    05/29/24 0823   BP: 126/74   Pulse: 88   SpO2: 99%   Weight: 122 kg (270 lb)   Height: 175.3 cm (69\")            5/29/2024     8:26 AM   PHQ-2/PHQ-9 Depression Screening   Little Interest or Pleasure in Doing Things 0-->not at all   Feeling Down, Depressed or Hopeless 0-->not at all   PHQ-9: Brief Depression Severity Measure Score 0                 Physical Exam  Physical Exam  The patient's appearance appears normal. Blood pressure is 126/74. BMI is 39.87.  The patient's pupils are equal and round. Sclerae are clear. Conjunctiva is normal.  The patient's thyroid is normal. No goiter. No thyroid nodules palpable. No carotid bruits auscultated.  The patient's lungs are clear to auscultation. No wheezes.  The patient's heart has a regular rate and rhythm. No murmur, rub, or gallop.  The patient's prostate is enlarged. No knots or nodules felt.         The following " data was reviewed by: Elliott Lopez MD on 05/29/2024:  Calcium, Ionized (05/22/2024 08:10)  TSH+Free T4 (05/22/2024 08:10)  PSA DIAGNOSTIC (05/22/2024 08:10)  MicroAlbumin, Urine, Random - Urine, Clean Catch (05/22/2024 08:10)  Hemoglobin A1c (05/22/2024 08:10)  CK (05/22/2024 08:10)  Lipid Panel With LDL / HDL Ratio (05/22/2024 08:10)  CBC & Differential (05/22/2024 08:10)  Comprehensive Metabolic Panel (05/22/2024 08:10)    Results  Laboratory Studies  White blood cell count 7.5. Hemoglobin 15.4. Blood sugar 111. BUN 19. Creatinine 1.21. Potassium slightly elevated. Calcium 10.5. Ionized calcium 5.5. Protein levels normal. Liver function tests normal. Alkaline phosphatase slightly elevated. Total cholesterol 125. Triglycerides 179. Good cholesterol below 40. LDL cholesterol 57. TSH 1.5. Free T4 1.75. PSA 3.7. Hemoglobin A1c 6.8. Creatinine kinase 119.                Assessment and Plan           Assessment & Plan  1. Essential hypertension, which is currently stable.  The patient is advised to continue with the current medication regimen.    2. Hyperlipidemia.  The patient's LDL cholesterol is at the desired level, with a mild elevation in triglycerides noted. The patient is recommended to adhere to a low-cholesterol, low-fat diet, and engage in cardiovascular exercise for 30 minutes, 5 days a week, such as brisk walking for 30 minutes daily.    3. Acquired hypothyroidism, which is stable.  The patient's TSH is at the desired level, with a normal T4 hormone.    4. Type 2 diabetes, which is currently controlled.  The patient is advised to continue with the current therapy.    5. Erectile dysfunction.  The patient's symptoms are stable. The patient is recommended to continue with the current medication regimen.    6. BPH symptoms with nocturia.  Repeat psa in september due to rapid increase noted on labs. Still lab numbers within normal range.    Follow-up  The patient is scheduled for an annual wellness visit in  09/2024, with labs to be conducted prior to the visit.       Patient was given instructions and counseling regarding his condition or for health maintenance advice. Please see specific information pulled into the AVS if appropriate.     Patient or patient representative verbalized consent for the use of Ambient Listening during the visit with  Elliott Lopez MD for chart documentation. 5/29/2024  08:49 EDT

## 2024-05-29 NOTE — PATIENT INSTRUCTIONS
"Fat and Cholesterol Restricted Diet  Getting too much fat and cholesterol in your diet may cause health problems. Following this diet helps keep your fat and cholesterol at normal levels. This can keep you from getting sick.  WHAT TYPES OF FAT SHOULD I CHOOSE?  Choose monosaturated and polyunsaturated fats. These are found in foods such as olive oil, canola oil, flaxseeds, walnuts, almonds, and seeds.  Eat more omega-3 fats. Good choices include salmon, mackerel, sardines, tuna, flaxseed oil, and ground flaxseeds.  Limit saturated fats. These are in animal products such as meats, butter, and cream. They can also be in plant products such as palm oil, palm kernel oil, and coconut oil.      Avoid foods with partially hydrogenated oils in them. These contain trans fats. Examples of foods that have trans fats are stick margarine, some tub margarines, cookies, crackers, and other baked goods.  WHAT GENERAL GUIDELINES DO I NEED TO FOLLOW?    Check food labels. Look for the words \"trans fat\" and \"saturated fat.\"  When preparing a meal:  Fill half of your plate with vegetables and green salads.  Fill one fourth of your plate with whole grains. Look for the word \"whole\" as the first word in the ingredient list.  Fill one fourth of your plate with lean protein foods.  Limit fruit to two servings a day. Choose fruit instead of juice.  Eat more foods with soluble fiber. Examples of foods with this type of fiber are apples, broccoli, carrots, beans, peas, and barley. Try to get 20-30 g (grams) of fiber per day.  Eat more home-cooked foods. Eat less at restaurants and buffets.  Limit or avoid alcohol.  Limit foods high in starch and sugar.Fat and Cholesterol Restricted Eating Plan  Getting too much fat and cholesterol in your diet may cause health problems. Choosing the right foods helps keep your fat and cholesterol at normal levels. This can keep you from getting certain diseases.  Your doctor may recommend an eating plan that " "includes:  Total fat: ______% or less of total calories a day.  Saturated fat: ______% or less of total calories a day.  Cholesterol: less than _________mg a day.  Fiber: ______g a day.  What are tips for following this plan?  Meal planning  At meals, divide your plate into four equal parts:  Fill one-half of your plate with vegetables and green salads.  Fill one-fourth of your plate with whole grains.  Fill one-fourth of your plate with low-fat (lean) protein foods.  Eat fish that is high in omega-3 fats at least two times a week. This includes mackerel, tuna, sardines, and salmon.  Eat foods that are high in fiber, such as whole grains, beans, apples, broccoli, carrots, peas, and barley.  General tips    Work with your doctor to lose weight if you need to.  Avoid:  Foods with added sugar.  Fried foods.  Foods with partially hydrogenated oils.  Limit alcohol intake to no more than 1 drink a day for nonpregnant women and 2 drinks a day for men. One drink equals 12 oz of beer, 5 oz of wine, or 1½ oz of hard liquor.    Reading food labels  Check food labels for:  Trans fats.  Partially hydrogenated oils.  Saturated fat (g) in each serving.  Cholesterol (mg) in each serving.  Fiber (g) in each serving.  Choose foods with healthy fats, such as:  Monounsaturated fats.  Polyunsaturated fats.  Omega-3 fats.  Choose grain products that have whole grains. Look for the word \"whole\" as the first word in the ingredient list.  Cooking  Cook foods using low-fat methods. These include baking, boiling, grilling, and broiling.  Eat more home-cooked foods. Eat at restaurants and buffets less often.  Avoid cooking using saturated fats, such as butter, cream, palm oil, palm kernel oil, and coconut oil.  Recommended foods    Fruits  All fresh, canned (in natural juice), or frozen fruits.  Vegetables  Fresh or frozen vegetables (raw, steamed, roasted, or grilled). Green salads.  Grains  Whole grains, such as whole wheat or whole grain " breads, crackers, cereals, and pasta. Unsweetened oatmeal, bulgur, barley, quinoa, or brown rice. Corn or whole wheat flour tortillas.  Meats and other protein foods  Ground beef (85% or leaner), grass-fed beef, or beef trimmed of fat. Skinless chicken or turkey. Ground chicken or turkey. Pork trimmed of fat. All fish and seafood. Egg whites. Dried beans, peas, or lentils. Unsalted nuts or seeds. Unsalted canned beans. Nut butters without added sugar or oil.  Dairy  Low-fat or nonfat dairy products, such as skim or 1% milk, 2% or reduced-fat cheeses, low-fat and fat-free ricotta or cottage cheese, or plain low-fat and nonfat yogurt.  Fats and oils  Tub margarine without trans fats. Light or reduced-fat mayonnaise and salad dressings. Avocado. Olive, canola, sesame, or safflower oils.  The items listed above may not be a complete list of foods and beverages you can eat. Contact a dietitian for more information.  Foods to avoid  Fruits  Canned fruit in heavy syrup. Fruit in cream or butter sauce. Fried fruit.  Vegetables  Vegetables cooked in cheese, cream, or butter sauce. Fried vegetables.  Grains  White bread. White pasta. White rice. Cornbread. Bagels, pastries, and croissants. Crackers and snack foods that contain trans fat and hydrogenated oils.  Meats and other protein foods  Fatty cuts of meat. Ribs, chicken wings, graff, sausage, bologna, salami, chitterlings, fatback, hot dogs, bratwurst, and packaged lunch meats. Liver and organ meats. Whole eggs and egg yolks. Chicken and turkey with skin. Fried meat.  Dairy  Whole or 2% milk, cream, half-and-half, and cream cheese. Whole milk cheeses. Whole-fat or sweetened yogurt. Full-fat cheeses. Nondairy creamers and whipped toppings. Processed cheese, cheese spreads, and cheese curds.  Beverages  Alcohol. Sugar-sweetened drinks such as sodas, lemonade, and fruit drinks.  Fats and oils  Butter, stick margarine, lard, shortening, ghee, or graff fat. Coconut, palm  kernel, and palm oils.  Sweets and desserts  Corn syrup, sugars, honey, and molasses. Candy. Jam and jelly. Syrup. Sweetened cereals. Cookies, pies, cakes, donuts, muffins, and ice cream.  The items listed above may not be a complete list of foods and beverages you should avoid. Contact a dietitian for more information.  Summary  Choosing the right foods helps keep your fat and cholesterol at normal levels. This can keep you from getting certain diseases.  At meals, fill one-half of your plate with vegetables and green salads.  Eat high-fiber foods, like whole grains, beans, apples, carrots, peas, and barley.  Limit added sugar, saturated fats, alcohol, and fried foods.  This information is not intended to replace advice given to you by your health care provider. Make sure you discuss any questions you have with your health care provider.  Document Revised: 04/21/2021 Document Reviewed: 04/21/2021  [employer] Patient Education © 2021 [employer]    Limit fried foods.  Cook foods without frying them. Baking, boiling, grilling, and broiling are all great options.  Lose weight if you are overweight. Losing even a small amount of weight can help your overall health. It can also help prevent diseases such as diabetes and heart disease.  WHAT FOODS CAN I EAT?  Grains  Whole grains, such as whole wheat or whole grain breads, crackers, cereals, and pasta. Unsweetened oatmeal, bulgur, barley, quinoa, or brown rice. Corn or whole wheat flour tortillas.  Vegetables  Fresh or frozen vegetables (raw, steamed, roasted, or grilled). Green salads.  Fruits  All fresh, canned (in natural juice), or frozen fruits.  Meat and Other Protein Products  Ground beef (85% or leaner), grass-fed beef, or beef trimmed of fat. Skinless chicken or turkey. Ground chicken or turkey. Pork trimmed of fat. All fish and seafood. Eggs. Dried beans, peas, or lentils. Unsalted nuts or seeds. Unsalted canned or dry beans.  Dairy  Low-fat dairy products,  such as skim or 1% milk, 2% or reduced-fat cheeses, low-fat ricotta or cottage cheese, or plain low-fat yogurt.  Fats and Oils  Tub margarines without trans fats. Light or reduced-fat mayonnaise and salad dressings. Avocado. Olive, canola, sesame, or safflower oils. Natural peanut or almond butter (choose ones without added sugar and oil).  The items listed above may not be a complete list of recommended foods or beverages. Contact your dietitian for more options.  WHAT FOODS ARE NOT RECOMMENDED?  Grains  White bread. White pasta. White rice. Cornbread. Bagels, pastries, and croissants. Crackers that contain trans fat.  Vegetables  White potatoes. Corn. Creamed or fried vegetables. Vegetables in a cheese sauce.  Fruits  Dried fruits. Canned fruit in light or heavy syrup. Fruit juice.  Meat and Other Protein Products  Fatty cuts of meat. Ribs, chicken wings, graff, sausage, bologna, salami, chitterlings, fatback, hot dogs, bratwurst, and packaged luncheon meats. Liver and organ meats.  Dairy  Whole or 2% milk, cream, half-and-half, and cream cheese. Whole milk cheeses. Whole-fat or sweetened yogurt. Full-fat cheeses. Nondairy creamers and whipped toppings. Processed cheese, cheese spreads, or cheese curds.  Sweets and Desserts  Corn syrup, sugars, honey, and molasses. Candy. Jam and jelly. Syrup. Sweetened cereals. Cookies, pies, cakes, donuts, muffins, and ice cream.  Fats and Oils  Butter, stick margarine, lard, shortening, ghee, or graff fat. Coconut, palm kernel, or palm oils.  Beverages  Alcohol. Sweetened drinks (such as sodas, lemonade, and fruit drinks or punches).  The items listed above may not be a complete list of foods and beverages to avoid. Contact your dietitian for more information.  Document Released: 06/18/2013 Document Revised: 08/21/2015 Document Reviewed: 03/19/2015  [employer]® Patient Information ©2015 [employer], [facility]. This information is not intended to replace advice given to you by your health  care provider. Make sure you discuss any questions you have with your health care provider. Cholesterol Content in Foods  Cholesterol is a waxy, fat-like substance that helps to carry fat in the blood. The body needs cholesterol in small amounts, but too much cholesterol can cause damage to the arteries and heart.  What foods have cholesterol?    Cholesterol is found in animal-based foods, such as meat, seafood, and dairy. Generally, low-fat dairy and lean meats have less cholesterol than full-fat dairy and fatty meats. The milligrams of cholesterol per serving (mg per serving) of common cholesterol-containing foods are listed below.  Meats and other proteins  Egg -- one large whole egg has 186 mg.  Veal shank -- 4 oz (113 g) has 141 mg.  Lean ground turkey (93% lean) -- 4 oz (113 g) has 118 mg.  Fat-trimmed lamb loin -- 4 oz (113 g) has 106 mg.  Lean ground beef (90% lean) -- 4 oz (113 g) has 100 mg.  Lobster -- 3.5 oz (99 g) has 90 mg.  Pork loin chops -- 4 oz (113 g) has 86 mg.  Canned salmon -- 3.5 oz (99 g) has 83 mg.  Fat-trimmed beef top loin -- 4 oz (113 g) has 78 mg.  Frankfurter -- 1 ramos (3.5 oz or 99 g) has 77 mg.  Crab -- 3.5 oz (99 g) has 71 mg.  Roasted chicken without skin, white meat -- 4 oz (113 g) has 66 mg.  Light bologna -- 2 oz (57 g) has 45 mg.  Deli-cut turkey -- 2 oz (57 g) has 31 mg.  Canned tuna -- 3.5 oz (99 g) has 31 mg.  [place] -- 1 oz (28 g) has 29 mg.  Oysters and mussels (raw) -- 3.5 oz (99 g) has 25 mg.  Mackerel -- 1 oz (28 g) has 22 mg.  Trout -- 1 oz (28 g) has 20 mg.  Pork sausage -- 1 link (1 oz or 28 g) has 17 mg.  [place] -- 1 oz (28 g) has 16 mg.  Tilapia -- 1 oz (28 g) has 14 mg.  Dairy  Soft-serve ice cream -- ½ cup (4 oz or 86 g) has 103 mg.  Whole-milk yogurt -- 1 cup (8 oz or 245 g) has 29 mg.  Cheddar cheese -- 1 oz (28 g) has 28 mg.  American cheese -- 1 oz (28 g) has 28 mg.  Whole milk -- 1 cup (8 oz or 250 mL) has 23 mg.  2% milk -- 1 cup (8 oz or 250 mL) has 18  mg.  Cream cheese -- 1 tablespoon (Tbsp) (14.5 g) has 15 mg.  Cottage cheese -- ½ cup (4 oz or 113 g) has 14 mg.  Low-fat (1%) milk -- 1 cup (8 oz or 250 mL) has 10 mg.  Sour cream -- 1 Tbsp (12 g) has 8.5 mg.  Low-fat yogurt -- 1 cup (8 oz or 245 g) has 8 mg.  Nonfat Greek yogurt -- 1 cup (8 oz or 228 g) has 7 mg.  Half-and-half cream -- 1 Tbsp (15 mL) has 5 mg.  Fats and oils  Cod liver oil -- 1 tablespoon (Tbsp) (13.6 g) has 82 mg.  Butter -- 1 Tbsp (14 g) has 15 mg.  Lard -- 1 Tbsp (12.8 g) has 14 mg.  [place] grease -- 1 Tbsp (12.9 g) has 14 mg.  Mayonnaise -- 1 Tbsp (13.8 g) has 5-10 mg.  Margarine -- 1 Tbsp (14 g) has 3-10 mg.  The items listed above may not be a complete list of foods with cholesterol. Exact amounts of cholesterol in these foods may vary depending on specific ingredients and brands. Contact a dietitian for more information.  What foods do not have cholesterol?  Most plant-based foods do not have cholesterol unless you combine them with a food that has cholesterol. Foods without cholesterol include:  Grains and cereals.  Vegetables.  Fruits.  Vegetable oils, such as olive, canola, and sunflower oil.  Legumes, such as peas, beans, and lentils.  Nuts and seeds.  Egg whites.  The items listed above may not be a complete list of foods that do not have cholesterol. Contact a dietitian for more information.  Summary  The body needs cholesterol in small amounts, but too much cholesterol can cause damage to the arteries and heart.  Cholesterol is found in animal-based foods, such as meat, seafood, and dairy. Generally, low-fat dairy and lean meats have less cholesterol than full-fat dairy and fatty meats.  This information is not intended to replace advice given to you by your health care provider. Make sure you discuss any questions you have with your health care provider.  Document Revised: 04/29/2022 Document Reviewed: 04/29/2022  Elsevier Patient Education © 2024 [employer].  Exercising to Lose  Weight  Getting regular exercise is important for everyone. It is especially important if you are overweight. Being overweight increases your risk of heart disease, stroke, diabetes, high blood pressure, and several types of cancer. Exercising, and reducing the calories you consume, can help you lose weight and improve fitness and health.  Exercise can be moderate or vigorous intensity. To lose weight, most people need to do a certain amount of moderate or vigorous-intensity exercise each week.  How can exercise affect me?  You lose weight when you exercise enough to burn more calories than you eat. Exercise also reduces body fat and builds muscle. The more muscle you have, the more calories you burn. Exercise also:  Improves mood.  Reduces stress and tension.  Improves your overall fitness, flexibility, and endurance.  Increases bone strength.  Moderate-intensity exercise    Moderate-intensity exercise is any activity that gets you moving enough to burn at least three times more energy (calories) than if you were sitting.  Examples of moderate exercise include:  Walking a mile in 15 minutes.  Doing light yard work.  Biking at an easy pace.  Most people should get at least 150 minutes of moderate-intensity exercise a week to maintain their body weight.  Vigorous-intensity exercise  Vigorous-intensity exercise is any activity that gets you moving enough to burn at least six times more calories than if you were sitting. When you exercise at this intensity, you should be working hard enough that you are not able to carry on a conversation.  Examples of vigorous exercise include:  Running.  Playing a team sport, such as football, basketball, and soccer.  Jumping rope.  Most people should get at least 75 minutes a week of vigorous exercise to maintain their body weight.  What actions can I take to lose weight?  The amount of exercise you need to lose weight depends on:  Your age.  The type of exercise.  Any health  conditions you have.  Your overall physical ability.  Talk to your health care provider about how much exercise you need and what types of activities are safe for you.  Nutrition    Make changes to your diet as told by your health care provider or diet and nutrition specialist (dietitian). This may include:  Eating fewer calories.  Eating more protein.  Eating less unhealthy fats.  Eating a diet that includes fresh fruits and vegetables, whole grains, low-fat dairy products, and lean protein.  Avoiding foods with added fat, salt, and sugar.  Drink plenty of water while you exercise to prevent dehydration or heat stroke.  Activity  Choose an activity that you enjoy and set realistic goals. Your health care provider can help you make an exercise plan that works for you.  Exercise at a moderate or vigorous intensity most days of the week.  The intensity of exercise may vary from person to person. You can tell how intense a workout is for you by paying attention to your breathing and heartbeat. Most people will notice their breathing and heartbeat get faster with more intense exercise.  Do resistance training twice each week, such as:  Push-ups.  Sit-ups.  Lifting weights.  Using resistance bands.  Getting short amounts of exercise can be just as helpful as long, structured periods of exercise. If you have trouble finding time to exercise, try doing these things as part of your daily routine:  Get up, stretch, and walk around every 30 minutes throughout the day.  Go for a walk during your lunch break.  Park your car farther away from your destination.  If you take public transportation, get off one stop early and walk the rest of the way.  Make phone calls while standing up and walking around.  Take the stairs instead of elevators or escalators.  Wear comfortable clothes and shoes with good support.  Do not exercise so much that you hurt yourself, feel dizzy, or get very short of breath.  Where to find more  information  [place] Department of Health and Human Services: www.hhs.gov  Centers for Disease Control and Prevention: www.cdc.gov  Contact a health care provider:  Before starting a new exercise program.  If you have questions or concerns about your weight.  If you have a medical problem that keeps you from exercising.  Get help right away if:  You have any of the following while exercising:  Injury.  Dizziness.  Difficulty breathing or shortness of breath that does not go away when you stop exercising.  Chest pain.  Rapid heartbeat.  These symptoms may represent a serious problem that is an emergency. Do not wait to see if the symptoms will go away. Get medical help right away. Call your local emergency services (911 in the [place] Do not drive yourself to the hospital.  Summary  Getting regular exercise is especially important if you are overweight.  Being overweight increases your risk of heart disease, stroke, diabetes, high blood pressure, and several types of cancer.  Losing weight happens when you burn more calories than you eat.  Reducing the amount of calories you eat, and getting regular moderate or vigorous exercise each week, helps you lose weight.  This information is not intended to replace advice given to you by your health care provider. Make sure you discuss any questions you have with your health care provider.  Document Revised: 02/13/2022 Document Reviewed: 02/13/2022  Elsevier Patient Education © 2024 [employer].

## 2024-07-25 ENCOUNTER — PRIOR AUTHORIZATION (OUTPATIENT)
Dept: FAMILY MEDICINE CLINIC | Facility: CLINIC | Age: 59
End: 2024-07-25
Payer: COMMERCIAL

## 2024-07-25 NOTE — TELEPHONE ENCOUNTER
Rybelsus 3MG tablets    Key: [id number]  Reference #: [id number]  Status: Approved    Authorization Expiration Date: July 24, 2025.

## 2024-09-12 ENCOUNTER — OFFICE VISIT (OUTPATIENT)
Dept: FAMILY MEDICINE CLINIC | Facility: CLINIC | Age: 59
End: 2024-09-12
Payer: COMMERCIAL

## 2024-09-12 VITALS
BODY MASS INDEX: 41.32 KG/M2 | HEART RATE: 73 BPM | HEIGHT: 69 IN | SYSTOLIC BLOOD PRESSURE: 122 MMHG | DIASTOLIC BLOOD PRESSURE: 74 MMHG | WEIGHT: 279 LBS | OXYGEN SATURATION: 98 %

## 2024-09-12 DIAGNOSIS — R35.1 BPH ASSOCIATED WITH NOCTURIA: ICD-10-CM

## 2024-09-12 DIAGNOSIS — E03.9 ACQUIRED HYPOTHYROIDISM: ICD-10-CM

## 2024-09-12 DIAGNOSIS — N40.1 BPH ASSOCIATED WITH NOCTURIA: ICD-10-CM

## 2024-09-12 DIAGNOSIS — I10 ESSENTIAL HYPERTENSION: ICD-10-CM

## 2024-09-12 DIAGNOSIS — E11.9 TYPE 2 DIABETES MELLITUS WITHOUT COMPLICATION, WITHOUT LONG-TERM CURRENT USE OF INSULIN: Primary | ICD-10-CM

## 2024-09-12 DIAGNOSIS — E78.49 OTHER HYPERLIPIDEMIA: ICD-10-CM

## 2024-09-12 DIAGNOSIS — E66.01 CLASS 3 SEVERE OBESITY DUE TO EXCESS CALORIES WITH SERIOUS COMORBIDITY AND BODY MASS INDEX (BMI) OF 40.0 TO 44.9 IN ADULT: ICD-10-CM

## 2024-09-12 PROCEDURE — 99396 PREV VISIT EST AGE 40-64: CPT | Performed by: FAMILY MEDICINE

## 2024-09-12 RX ORDER — PIOGLITAZONEHYDROCHLORIDE 45 MG/1
45 TABLET ORAL DAILY
Qty: 30 TABLET | Refills: 8 | Status: SHIPPED | OUTPATIENT
Start: 2024-09-12 | End: 2025-06-09

## 2024-09-12 RX ORDER — IRBESARTAN 300 MG/1
300 TABLET ORAL DAILY
Qty: 30 TABLET | Refills: 8 | Status: SHIPPED | OUTPATIENT
Start: 2024-09-12 | End: 2025-06-09

## 2024-09-12 RX ORDER — ATORVASTATIN CALCIUM 40 MG/1
40 TABLET, FILM COATED ORAL DAILY
Qty: 30 TABLET | Refills: 8 | Status: SHIPPED | OUTPATIENT
Start: 2024-09-12 | End: 2025-06-09

## 2024-09-12 RX ORDER — LEVOTHYROXINE SODIUM 175 UG/1
175 TABLET ORAL
Qty: 30 TABLET | Refills: 8 | Status: SHIPPED | OUTPATIENT
Start: 2024-09-12 | End: 2025-06-09

## 2024-09-12 RX ORDER — AMLODIPINE BESYLATE 10 MG/1
10 TABLET ORAL DAILY
Qty: 30 TABLET | Refills: 8 | Status: SHIPPED | OUTPATIENT
Start: 2024-09-12 | End: 2025-06-09

## 2024-09-12 RX ORDER — EZETIMIBE 10 MG/1
10 TABLET ORAL DAILY
Qty: 30 TABLET | Refills: 8 | Status: SHIPPED | OUTPATIENT
Start: 2024-09-12 | End: 2025-06-09

## 2024-09-12 RX ORDER — METFORMIN HCL 500 MG
2000 TABLET, EXTENDED RELEASE 24 HR ORAL
Qty: 120 TABLET | Refills: 8 | Status: SHIPPED | OUTPATIENT
Start: 2024-09-12 | End: 2025-06-09

## 2024-09-12 RX ORDER — ORAL SEMAGLUTIDE 14 MG/1
1 TABLET ORAL
Qty: 30 TABLET | Refills: 8 | Status: SHIPPED | OUTPATIENT
Start: 2024-09-12 | End: 2025-06-09

## 2024-09-12 NOTE — PROGRESS NOTES
"Chief Complaint  Physical    Subjective        HPI      The patient presents for a wellness visit.    He reports experiencing knee pain but no other bone-related discomfort. His energy levels are generally satisfactory. He believes he has lost some weight.    He does not report any issues with his head, eyes, ears, nose, or throat. However, he mentions that his eyesight is deteriorating and plans to consult an optometrist in the coming month.    He is not experiencing shortness of breath, cough, chest pain, palpitations, stomach or digestive issues, constipation, difficulty urinating, back or muscle problems, skin rashes, headaches, dizziness, low mood, or anxiety.    He recalls undergoing a colonoscopy in the past and was advised to repeat the procedure every 10 years.    He is currently on pioglitazone and reports no leg swelling as a side effect.    SOCIAL HISTORY  He drinks alcohol.     Review of Systems   Constitutional:  Negative for fatigue.   HENT: Negative.     Eyes: Negative.    Respiratory:  Negative for cough and shortness of breath.    Cardiovascular:  Negative for chest pain and palpitations.   Gastrointestinal: Negative.    Genitourinary:  Negative for difficulty urinating.   Musculoskeletal:  Positive for arthralgias (both).   Skin:  Negative for rash.   Neurological:  Negative for dizziness.   Psychiatric/Behavioral:  Negative for dysphoric mood. The patient is not nervous/anxious.    All other systems reviewed and are negative.       Vital Signs:   Vitals:    09/12/24 1136   BP: 122/74   Pulse: 73   SpO2: 98%   Weight: 127 kg (279 lb)   Height: 175.3 cm (69\")            5/29/2024     8:26 AM   PHQ-2/PHQ-9 Depression Screening   Little Interest or Pleasure in Doing Things 0-->not at all   Feeling Down, Depressed or Hopeless 0-->not at all   PHQ-9: Brief Depression Severity Measure Score 0                 Physical Exam  Constitutional:       Appearance: Normal appearance. He is well-developed. He is " obese.   HENT:      Head: Normocephalic.      Right Ear: Hearing, tympanic membrane and external ear normal.      Left Ear: Hearing, tympanic membrane and external ear normal.   Eyes:      General: Lids are normal.      Conjunctiva/sclera: Conjunctivae normal.      Pupils: Pupils are equal, round, and reactive to light.      Funduscopic exam:     Right eye: No AV nicking or papilledema.         Left eye: No AV nicking or papilledema.   Neck:      Thyroid: No thyroid mass or thyromegaly.      Vascular: No carotid bruit or JVD.      Trachea: Trachea normal.   Cardiovascular:      Rate and Rhythm: Normal rate and regular rhythm.      Pulses: Normal pulses.      Heart sounds: Normal heart sounds. No murmur heard.  Pulmonary:      Effort: Pulmonary effort is normal.      Breath sounds: Normal breath sounds.   Abdominal:      General: Bowel sounds are normal.      Palpations: Abdomen is soft.      Tenderness: There is no abdominal tenderness.   Musculoskeletal:         General: Normal range of motion.      Cervical back: Normal range of motion and neck supple.      Lumbar back: No bony tenderness.      Right knee: Normal range of motion.        Legs:       Comments: Knee scar   Lymphadenopathy:      Cervical: No cervical adenopathy.      Upper Body:      Right upper body: No supraclavicular adenopathy.      Left upper body: No supraclavicular adenopathy.   Skin:     General: Skin is warm and dry.      Findings: No rash.      Nails: There is no clubbing.   Neurological:      Mental Status: He is alert and oriented to person, place, and time.      Cranial Nerves: No cranial nerve deficit.      Deep Tendon Reflexes:      Reflex Scores:       Patellar reflexes are 2+ on the right side and 2+ on the left side.  Psychiatric:         Speech: Speech normal.         Behavior: Behavior normal.         Thought Content: Thought content normal.         Judgment: Judgment normal.          Heart sounds are normal.    Vital Signs  Blood  pressure measures 122/74.       The following data was reviewed by: Elliott Lopez MD on 09/12/2024:  PSA DIAGNOSTIC (09/04/2024 09:07)  MicroAlbumin, Urine, Random - Urine, Clean Catch (09/04/2024 09:07)  Hemoglobin A1c (09/04/2024 09:07)  TSH (09/04/2024 09:07)  CK (09/04/2024 09:07)  Lipid Panel (09/04/2024 09:07)  CBC & Differential (09/04/2024 09:07)  Comprehensive Metabolic Panel (09/04/2024 09:07)    Results  Laboratory Studies  White blood cell count 7.3. Hemoglobin 14.9. Blood sugar 125. BUN 13, creatinine 1.04. Sodium 138, potassium 5.1, chloride 104, calcium 10.0. Protein levels are normal. Alkaline phosphatase 125. Cholesterol 151, triglycerides 174, HDL cholesterol 42, LDL cholesterol 79. Microalbumin 19.6. TSH 1.26. Creatinine kinase 66. PSA 1.6. Hemoglobin A1c 6.5.                Assessment and Plan     Orders Placed This Encounter   Procedures    Comprehensive Metabolic Panel    Lipid Panel    CK    TSH    Hemoglobin A1c    MicroAlbumin, Urine, Random - Urine, Clean Catch    CBC & Differential     New Medications Ordered This Visit   Medications    amLODIPine (NORVASC) 10 MG tablet     Sig: Take 1 tablet by mouth Daily for 270 days.     Dispense:  30 tablet     Refill:  8    atorvastatin (LIPITOR) 40 MG tablet     Sig: Take 1 tablet by mouth Daily for 270 days.     Dispense:  30 tablet     Refill:  8    ezetimibe (ZETIA) 10 MG tablet     Sig: Take 1 tablet by mouth Daily for 270 days.     Dispense:  30 tablet     Refill:  8    irbesartan (AVAPRO) 300 MG tablet     Sig: Take 1 tablet by mouth Daily for 270 days.     Dispense:  30 tablet     Refill:  8    levothyroxine (SYNTHROID, LEVOTHROID) 175 MCG tablet     Sig: Take 1 tablet by mouth Every Morning for 270 days.     Dispense:  30 tablet     Refill:  8    metFORMIN ER (GLUCOPHAGE-XR) 500 MG 24 hr tablet     Sig: Take 4 tablets by mouth Daily With Breakfast for 270 days.     Dispense:  120 tablet     Refill:  8    pioglitazone (Actos) 45 MG tablet      Sig: Take 1 tablet by mouth Daily for 270 days.     Dispense:  30 tablet     Refill:  8    Rybelsus 14 MG tablet     Sig: Take 1 tablet by mouth Every Morning Before Breakfast for 270 days.     Dispense:  30 tablet     Refill:  8        1. Adult wellness visit.  The examination today was generally normal. However, obesity remains a concern that he is actively addressing. A heart-healthy, low-cholesterol, and low-carbohydrate diet is recommended, along with regular exercise, specifically 30 minutes of brisk walking five days a week. Continued use of seatbelts in automobiles is advised, as well as avoiding prolonged sun exposure between 10 am and 2 pm. If sun exposure is unavoidable, a sunscreen with SPF 50 or greater should be used. Immunizations including Shingrix, flu shot, and COVID-19 vaccine are recommended for this fall. This information is included in the after-visit summary.    2. Type 2 diabetes.  His hemoglobin A1c is well-controlled. The current therapy with metformin, pioglitazone, and Rybelsus will be continued. Labs will be rechecked in 6 months.    3. Hyperlipidemia.  There is a mild progression of LDL cholesterol. He will continue to monitor his diet, and labs will be rechecked in 6 months. Atorvastatin 40 mg will be continued.    4. Essential hypertension.  His hypertension is well-controlled. The current therapy with amlodipine will be continued.    5. BPH with nocturia.  His symptoms are stable, and PSA has shown interval improvement. Labs will be rechecked in a year.    6. Acquired hypothyroidism.  His hypothyroidism is well-controlled. The current therapy will be continued, and labs will be rechecked in 6 months.    7. Medication management.  Prescriptions will be switched to month-by-month with refills due to insurance coverage. All prescriptions will be sent to [facility] in [place]. Sildenafil refills are adequate.  8. Class 3 Obesity  Improving. Continue with diet and exercise. Recheck in 6  months.      Return in about 6 months (around 3/12/2025) for recheck/refill medication.     Patient was given instructions and counseling regarding his condition or for health maintenance advice. Please see specific information pulled into the AVS if appropriate.     Patient or patient representative verbalized consent for the use of Ambient Listening during the visit with  Elliott Lopez MD for chart documentation. 9/12/2024  12:11 EDT

## 2024-09-12 NOTE — PATIENT INSTRUCTIONS
Annual flu shot has been recommended to patient. If you are age 65 or greater, then get the high dose influenza vaccination. Optimal timing of this vaccination is in mid October of each year.    Influenza (Flu) Vaccine (Inactivated or Recombinant): What You Need to Know  1. Why get vaccinated?  Influenza vaccine can prevent influenza (flu).  Flu is a contagious disease that spreads around the [place] every year, usually between October and May. Anyone can get the flu, but it is more dangerous for some people. Infants and young children, people 65 years and older, pregnant people, and people with certain health conditions or a weakened immune system are at greatest risk of flu complications.  Pneumonia, bronchitis, sinus infections, and ear infections are examples of flu-related complications. If you have a medical condition, such as heart disease, cancer, or diabetes, flu can make it worse.  Flu can cause fever and chills, sore throat, muscle aches, fatigue, cough, headache, and runny or stuffy nose. Some people may have vomiting and diarrhea, though this is more common in children than adults.  In an average year, thousands of people in the [place] die from flu, and many more are hospitalized. Flu vaccine prevents millions of illnesses and flu-related visits to the doctor each year.  2. Influenza vaccines  CDC recommends everyone 6 months and older get vaccinated every flu season. Children 6 months through 8 years of age may need 2 doses during a single flu season. Everyone else needs only 1 dose each flu season.  It takes about 2 weeks for protection to develop after vaccination.  There are many flu viruses, and they are always changing. Each year a new flu vaccine is made to protect against the influenza viruses believed to be likely to cause disease in the upcoming flu season. Even when the vaccine doesn't exactly match these viruses, it may still provide some protection.  Influenza vaccine does not  "cause flu.  Influenza vaccine may be given at the same time as other vaccines.  3. Talk with your health care provider  Tell your vaccination provider if the person getting the vaccine:  Has had an allergic reaction after a previous dose of influenza vaccine, or has any severe, life-threatening allergies  Has ever had Guillain-Barré Syndrome (also called \"GBS\")  In some cases, your health care provider may decide to postpone influenza vaccination until a future visit.  Influenza vaccine can be administered at any time during pregnancy. People who are or will be pregnant during influenza season should receive inactivated influenza vaccine.  People with minor illnesses, such as a cold, may be vaccinated. People who are moderately or severely ill should usually wait until they recover before getting influenza vaccine.  Your health care provider can give you more information.  4. Risks of a vaccine reaction  Soreness, redness, and swelling where the shot is given, fever, muscle aches, and headache can happen after influenza vaccination.  There may be a very small increased risk of Guillain-Barré Syndrome (GBS) after inactivated influenza vaccine (the flu shot).  Young children who get the flu shot along with pneumococcal vaccine (PCV13) and/or DTaP vaccine at the same time might be slightly more likely to have a seizure caused by fever. Tell your health care provider if a child who is getting flu vaccine has ever had a seizure.  People sometimes faint after medical procedures, including vaccination. Tell your provider if you feel dizzy or have vision changes or ringing in the ears.  As with any medicine, there is a very remote chance of a vaccine causing a severe allergic reaction, other serious injury, or death.  5. What if there is a serious problem?  An allergic reaction could occur after the vaccinated person leaves the clinic. If you see signs of a severe allergic reaction (hives, swelling of the face and throat, " difficulty breathing, a fast heartbeat, dizziness, or weakness), call 9-1-1 and get the person to the nearest hospital.  For other signs that concern you, call your health care provider.  Adverse reactions should be reported to the Vaccine Adverse Event Reporting System (VAERS). Your health care provider will usually file this report, or you can do it yourself. Visit the VAERS website at www.vaers.[facility].gov or call 1-109.248.9853. VAERS is only for reporting reactions, and VAERS staff members do not give medical advice.  6. The National Vaccine Injury Compensation Program  The National Vaccine Injury Compensation Program (VICP) is a federal program that was created to compensate people who may have been injured by certain vaccines. Claims regarding alleged injury or death due to vaccination have a time limit for filing, which may be as short as two years. Visit the VICP website at www.[facility].gov/vaccinecompensation or call 1-140.849.1272 to learn about the program and about filing a claim.  7. How can I learn more?  Ask your health care provider.  Call your local or state health department.  Visit the website of the Food and Drug Administration (FDA) for vaccine package inserts and additional information at www.fda.gov/vaccines-blood-biologics/vaccines.  Contact the Centers for Disease Control and Prevention (CDC):  Call 1-338.326.3887 (1-919-LVV-INFO) or  Visit CDC's website at www.cdc.gov/flu.  Source: CDC Vaccine Information Statement Inactivated Influenza Vaccine (8/6/2021)  This same material is available at www.cdc.gov for no charge.  This information is not intended to replace advice given to you by your health care provider. Make sure you discuss any questions you have with your health care provider.  Document Revised: 11/15/2022 Document Reviewed: 09/08/2022  Elsevier Patient Education © 2023 Else[employer] Inc.     You are due for Covid booster. Please get this vaccine at your local pharmacy. COVID-19 vaccine may be  administered without regards to timing of other vaccines.  If administering on the same day as another vaccine, administer in a different site (arm).  COVID-19 VACCINE reduces the risk of COVID-19. It does not treat COVID-19. It is still possible to get COVID-19 after receiving this vaccine, but the symptoms may be less severe or not last as long. It works by helping your immune system learn how to fight off a future infection.     What side effects may I notice from receiving this medication?  Side effects that you should report to your care team as soon as possible:  Allergic reactions--skin rash, itching, hives, swelling of the face, lips, tongue, or throat  Heart muscle inflammation--unusual weakness or fatigue, shortness of breath, chest pain, fast or irregular heartbeat, dizziness, swelling of the ankles, feet, or hands  Side effects that usually do not require medical attention (report these to your care team if they continue or are bothersome):  Chills  Fatigue  Fever  Headache  Joint pain  Muscle pain  Nausea  Pain, redness, or irritation at injection site  Swollen lymph nodes  Vomiting  This list may not describe all possible side effects. Call your doctor for medical advice about side effects. You may report side effects to FDA at 7-950-FDA-7511.     You are due for Shingrix vaccination series to prevent Shingles. Please get the immunization at your local pharmacy. It is more effective than the old Zostavax vaccine and is recommended even if you have had the Zostavax vaccine in the past. For more information, please look at the website below:  https://www.cdc.gov/vaccines/vpd/shingles/public/shingrix/index.html    Shingrix Vaccine Injection  What is this medication?  ZOSTER VACCINE (ZOS ter vak SEEN) reduces the risk of herpes zoster (shingles). It does not treat shingles. It is still possible to get shingles after receiving the vaccine, but the symptoms may be less severe or not last as long. It works by  helping your immune system learn how to fight off a future infection.  This medicine may be used for other purposes; ask your health care provider or pharmacist if you have questions.  COMMON BRAND NAME(S): SHINGRIX  What should I tell my care team before I take this medication?  They need to know if you have any of these conditions:  Cancer  Immune system problems  An unusual or allergic reaction to Zoster vaccine, other medications, foods, dyes, or preservatives  Pregnant or trying to get pregnant  Breastfeeding  How should I use this medication?  This vaccine is injected into a muscle. It is given by your care team.  This vaccine requires 2 doses to get the full benefit. Set a reminder for when your next dose is due.  A copy of Vaccine Information Statements will be given before each vaccination. Be sure to read this information carefully each time. This sheet may change often.  Talk to your care team about the use of this vaccine in children. This vaccine is not approved for use in children.  Overdosage: If you think you have taken too much of this medicine contact a poison control center or emergency room at once.  NOTE: This medicine is only for you. Do not share this medicine with others.  What if I miss a dose?  Keep appointments for follow-up (booster) doses. It is important not to miss your dose. Call your care team if you are unable to keep an appointment.  What may interact with this medication?  Medications that suppress your immune system  Medications to treat cancer  Steroid medications, such as prednisone or cortisone  This list may not describe all possible interactions. Give your health care provider a list of all the medicines, herbs, non-prescription drugs, or dietary supplements you use. Also tell them if you smoke, drink alcohol, or use illegal drugs. Some items may interact with your medicine.  What should I watch for while using this medication?  Visit your care team regularly.  This vaccine,  like all vaccines, may not fully protect everyone.  What side effects may I notice from receiving this medication?  Side effects that you should report to your care team as soon as possible:  Allergic reactions--skin rash, itching, hives, swelling of the face, lips, tongue, or throat  Side effects that usually do not require medical attention (report these to your care team if they continue or are bothersome):  Chills  Fatigue  Feeling faint or lightheaded  Fever  Headache  Muscle pain  Pain, redness, or irritation at injection site  This list may not describe all possible side effects. Call your doctor for medical advice about side effects. You may report side effects to FDA at 3-633-WBX-3792.  Where should I keep my medication?  This vaccine is only given by your care team. It will not be stored at home.  NOTE: This sheet is a summary. It may not cover all possible information. If you have questions about this medicine, talk to your doctor, pharmacist, or health care provider.  © 2023 Elsevier/Gold Standard (2023-06-08 00:00:00)     Exercising to Lose Weight  Getting regular exercise is important for everyone. It is especially important if you are overweight. Being overweight increases your risk of heart disease, stroke, diabetes, high blood pressure, and several types of cancer. Exercising, and reducing the calories you consume, can help you lose weight and improve fitness and health.  Exercise can be moderate or vigorous intensity. To lose weight, most people need to do a certain amount of moderate or vigorous-intensity exercise each week.  How can exercise affect me?  You lose weight when you exercise enough to burn more calories than you eat. Exercise also reduces body fat and builds muscle. The more muscle you have, the more calories you burn. Exercise also:  Improves mood.  Reduces stress and tension.  Improves your overall fitness, flexibility, and endurance.  Increases bone strength.  Moderate-intensity  exercise    Moderate-intensity exercise is any activity that gets you moving enough to burn at least three times more energy (calories) than if you were sitting.  Examples of moderate exercise include:  Walking a mile in 15 minutes.  Doing light yard work.  Biking at an easy pace.  Most people should get at least 150 minutes of moderate-intensity exercise a week to maintain their body weight.  Vigorous-intensity exercise  Vigorous-intensity exercise is any activity that gets you moving enough to burn at least six times more calories than if you were sitting. When you exercise at this intensity, you should be working hard enough that you are not able to carry on a conversation.  Examples of vigorous exercise include:  Running.  Playing a team sport, such as football, basketball, and soccer.  Jumping rope.  Most people should get at least 75 minutes a week of vigorous exercise to maintain their body weight.  What actions can I take to lose weight?  The amount of exercise you need to lose weight depends on:  Your age.  The type of exercise.  Any health conditions you have.  Your overall physical ability.  Talk to your health care provider about how much exercise you need and what types of activities are safe for you.  Nutrition    Make changes to your diet as told by your health care provider or diet and nutrition specialist (dietitian). This may include:  Eating fewer calories.  Eating more protein.  Eating less unhealthy fats.  Eating a diet that includes fresh fruits and vegetables, whole grains, low-fat dairy products, and lean protein.  Avoiding foods with added fat, salt, and sugar.  Drink plenty of water while you exercise to prevent dehydration or heat stroke.  Activity  Choose an activity that you enjoy and set realistic goals. Your health care provider can help you make an exercise plan that works for you.  Exercise at a moderate or vigorous intensity most days of the week.  The intensity of exercise may vary  from person to person. You can tell how intense a workout is for you by paying attention to your breathing and heartbeat. Most people will notice their breathing and heartbeat get faster with more intense exercise.  Do resistance training twice each week, such as:  Push-ups.  Sit-ups.  Lifting weights.  Using resistance bands.  Getting short amounts of exercise can be just as helpful as long, structured periods of exercise. If you have trouble finding time to exercise, try doing these things as part of your daily routine:  Get up, stretch, and walk around every 30 minutes throughout the day.  Go for a walk during your lunch break.  Park your car farther away from your destination.  If you take public transportation, get off one stop early and walk the rest of the way.  Make phone calls while standing up and walking around.  Take the stairs instead of elevators or escalators.  Wear comfortable clothes and shoes with good support.  Do not exercise so much that you hurt yourself, feel dizzy, or get very short of breath.  Where to find more information  [place] Department of Health and Human Services: www.hhs.gov  Centers for Disease Control and Prevention: www.cdc.gov  Contact a health care provider:  Before starting a new exercise program.  If you have questions or concerns about your weight.  If you have a medical problem that keeps you from exercising.  Get help right away if:  You have any of the following while exercising:  Injury.  Dizziness.  Difficulty breathing or shortness of breath that does not go away when you stop exercising.  Chest pain.  Rapid heartbeat.  These symptoms may represent a serious problem that is an emergency. Do not wait to see if the symptoms will go away. Get medical help right away. Call your local emergency services (911 in the [place] Do not drive yourself to the hospital.  Summary  Getting regular exercise is especially important if you are overweight.  Being overweight increases your  "risk of heart disease, stroke, diabetes, high blood pressure, and several types of cancer.  Losing weight happens when you burn more calories than you eat.  Reducing the amount of calories you eat, and getting regular moderate or vigorous exercise each week, helps you lose weight.  This information is not intended to replace advice given to you by your health care provider. Make sure you discuss any questions you have with your health care provider.  Document Revised: 02/13/2022 Document Reviewed: 02/13/2022  [employer] Patient Education © 2024 [employer]  Fat and Cholesterol Restricted Diet  Getting too much fat and cholesterol in your diet may cause health problems. Following this diet helps keep your fat and cholesterol at normal levels. This can keep you from getting sick.  WHAT TYPES OF FAT SHOULD I CHOOSE?  Choose monosaturated and polyunsaturated fats. These are found in foods such as olive oil, canola oil, flaxseeds, walnuts, almonds, and seeds.  Eat more omega-3 fats. Good choices include salmon, mackerel, sardines, tuna, flaxseed oil, and ground flaxseeds.  Limit saturated fats. These are in animal products such as meats, butter, and cream. They can also be in plant products such as palm oil, palm kernel oil, and coconut oil.      Avoid foods with partially hydrogenated oils in them. These contain trans fats. Examples of foods that have trans fats are stick margarine, some tub margarines, cookies, crackers, and other baked goods.  WHAT GENERAL GUIDELINES DO I NEED TO FOLLOW?    Check food labels. Look for the words \"trans fat\" and \"saturated fat.\"  When preparing a meal:  Fill half of your plate with vegetables and green salads.  Fill one fourth of your plate with whole grains. Look for the word \"whole\" as the first word in the ingredient list.  Fill one fourth of your plate with lean protein foods.  Limit fruit to two servings a day. Choose fruit instead of juice.  Eat more foods with soluble fiber. " Examples of foods with this type of fiber are apples, broccoli, carrots, beans, peas, and barley. Try to get 20-30 g (grams) of fiber per day.  Eat more home-cooked foods. Eat less at restaurants and buffets.  Limit or avoid alcohol.  Limit foods high in starch and sugar.Fat and Cholesterol Restricted Eating Plan  Getting too much fat and cholesterol in your diet may cause health problems. Choosing the right foods helps keep your fat and cholesterol at normal levels. This can keep you from getting certain diseases.  Your doctor may recommend an eating plan that includes:  Total fat: ______% or less of total calories a day.  Saturated fat: ______% or less of total calories a day.  Cholesterol: less than _________mg a day.  Fiber: ______g a day.  What are tips for following this plan?  Meal planning  At meals, divide your plate into four equal parts:  Fill one-half of your plate with vegetables and green salads.  Fill one-fourth of your plate with whole grains.  Fill one-fourth of your plate with low-fat (lean) protein foods.  Eat fish that is high in omega-3 fats at least two times a week. This includes mackerel, tuna, sardines, and salmon.  Eat foods that are high in fiber, such as whole grains, beans, apples, broccoli, carrots, peas, and barley.  General tips    Work with your doctor to lose weight if you need to.  Avoid:  Foods with added sugar.  Fried foods.  Foods with partially hydrogenated oils.  Limit alcohol intake to no more than 1 drink a day for nonpregnant women and 2 drinks a day for men. One drink equals 12 oz of beer, 5 oz of wine, or 1½ oz of hard liquor.    Reading food labels  Check food labels for:  Trans fats.  Partially hydrogenated oils.  Saturated fat (g) in each serving.  Cholesterol (mg) in each serving.  Fiber (g) in each serving.  Choose foods with healthy fats, such as:  Monounsaturated fats.  Polyunsaturated fats.  Omega-3 fats.  Choose grain products that have whole grains. Look for the  "word \"whole\" as the first word in the ingredient list.  Cooking  Cook foods using low-fat methods. These include baking, boiling, grilling, and broiling.  Eat more home-cooked foods. Eat at restaurants and buffets less often.  Avoid cooking using saturated fats, such as butter, cream, palm oil, palm kernel oil, and coconut oil.  Recommended foods    Fruits  All fresh, canned (in natural juice), or frozen fruits.  Vegetables  Fresh or frozen vegetables (raw, steamed, roasted, or grilled). Green salads.  Grains  Whole grains, such as whole wheat or whole grain breads, crackers, cereals, and pasta. Unsweetened oatmeal, bulgur, barley, quinoa, or brown rice. Corn or whole wheat flour tortillas.  Meats and other protein foods  Ground beef (85% or leaner), grass-fed beef, or beef trimmed of fat. Skinless chicken or turkey. Ground chicken or turkey. Pork trimmed of fat. All fish and seafood. Egg whites. Dried beans, peas, or lentils. Unsalted nuts or seeds. Unsalted canned beans. Nut butters without added sugar or oil.  Dairy  Low-fat or nonfat dairy products, such as skim or 1% milk, 2% or reduced-fat cheeses, low-fat and fat-free ricotta or cottage cheese, or plain low-fat and nonfat yogurt.  Fats and oils  Tub margarine without trans fats. Light or reduced-fat mayonnaise and salad dressings. Avocado. Olive, canola, sesame, or safflower oils.  The items listed above may not be a complete list of foods and beverages you can eat. Contact a dietitian for more information.  Foods to avoid  Fruits  Canned fruit in heavy syrup. Fruit in cream or butter sauce. Fried fruit.  Vegetables  Vegetables cooked in cheese, cream, or butter sauce. Fried vegetables.  Grains  White bread. White pasta. White rice. Cornbread. Bagels, pastries, and croissants. Crackers and snack foods that contain trans fat and hydrogenated oils.  Meats and other protein foods  Fatty cuts of meat. Ribs, chicken wings, graff, sausage, bologna, salami, " chitterlings, fatback, hot dogs, bratwurst, and packaged lunch meats. Liver and organ meats. Whole eggs and egg yolks. Chicken and turkey with skin. Fried meat.  Dairy  Whole or 2% milk, cream, half-and-half, and cream cheese. Whole milk cheeses. Whole-fat or sweetened yogurt. Full-fat cheeses. Nondairy creamers and whipped toppings. Processed cheese, cheese spreads, and cheese curds.  Beverages  Alcohol. Sugar-sweetened drinks such as sodas, lemonade, and fruit drinks.  Fats and oils  Butter, stick margarine, lard, shortening, ghee, or graff fat. Coconut, palm kernel, and palm oils.  Sweets and desserts  Corn syrup, sugars, honey, and molasses. Candy. Jam and jelly. Syrup. Sweetened cereals. Cookies, pies, cakes, donuts, muffins, and ice cream.  The items listed above may not be a complete list of foods and beverages you should avoid. Contact a dietitian for more information.  Summary  Choosing the right foods helps keep your fat and cholesterol at normal levels. This can keep you from getting certain diseases.  At meals, fill one-half of your plate with vegetables and green salads.  Eat high-fiber foods, like whole grains, beans, apples, carrots, peas, and barley.  Limit added sugar, saturated fats, alcohol, and fried foods.  This information is not intended to replace advice given to you by your health care provider. Make sure you discuss any questions you have with your health care provider.  Document Revised: 04/21/2021 Document Reviewed: 04/21/2021  [employer] Patient Education © 2021 [employer]    Limit fried foods.  Cook foods without frying them. Baking, boiling, grilling, and broiling are all great options.  Lose weight if you are overweight. Losing even a small amount of weight can help your overall health. It can also help prevent diseases such as diabetes and heart disease.  WHAT FOODS CAN I EAT?  Grains  Whole grains, such as whole wheat or whole grain breads, crackers, cereals, and pasta. Unsweetened  oatmeal, bulgur, barley, quinoa, or brown rice. Corn or whole wheat flour tortillas.  Vegetables  Fresh or frozen vegetables (raw, steamed, roasted, or grilled). Green salads.  Fruits  All fresh, canned (in natural juice), or frozen fruits.  Meat and Other Protein Products  Ground beef (85% or leaner), grass-fed beef, or beef trimmed of fat. Skinless chicken or turkey. Ground chicken or turkey. Pork trimmed of fat. All fish and seafood. Eggs. Dried beans, peas, or lentils. Unsalted nuts or seeds. Unsalted canned or dry beans.  Dairy  Low-fat dairy products, such as skim or 1% milk, 2% or reduced-fat cheeses, low-fat ricotta or cottage cheese, or plain low-fat yogurt.  Fats and Oils  Tub margarines without trans fats. Light or reduced-fat mayonnaise and salad dressings. Avocado. Olive, canola, sesame, or safflower oils. Natural peanut or almond butter (choose ones without added sugar and oil).  The items listed above may not be a complete list of recommended foods or beverages. Contact your dietitian for more options.  WHAT FOODS ARE NOT RECOMMENDED?  Grains  White bread. White pasta. White rice. Cornbread. Bagels, pastries, and croissants. Crackers that contain trans fat.  Vegetables  White potatoes. Corn. Creamed or fried vegetables. Vegetables in a cheese sauce.  Fruits  Dried fruits. Canned fruit in light or heavy syrup. Fruit juice.  Meat and Other Protein Products  Fatty cuts of meat. Ribs, chicken wings, graff, sausage, bologna, salami, chitterlings, fatback, hot dogs, bratwurst, and packaged luncheon meats. Liver and organ meats.  Dairy  Whole or 2% milk, cream, half-and-half, and cream cheese. Whole milk cheeses. Whole-fat or sweetened yogurt. Full-fat cheeses. Nondairy creamers and whipped toppings. Processed cheese, cheese spreads, or cheese curds.  Sweets and Desserts  Corn syrup, sugars, honey, and molasses. Candy. Jam and jelly. Syrup. Sweetened cereals. Cookies, pies, cakes, donuts, muffins, and ice  cream.  Fats and Oils  Butter, stick margarine, lard, shortening, ghee, or graff fat. Coconut, palm kernel, or palm oils.  Beverages  Alcohol. Sweetened drinks (such as sodas, lemonade, and fruit drinks or punches).  The items listed above may not be a complete list of foods and beverages to avoid. Contact your dietitian for more information.  Document Released: 06/18/2013 Document Revised: 08/21/2015 Document Reviewed: 03/19/2015  [employer]Care® Patient Information ©2015 [employer], [facility]. This information is not intended to replace advice given to you by your health care provider. Make sure you discuss any questions you have with your health care provider. Cholesterol Content in Foods  Cholesterol is a waxy, fat-like substance that helps to carry fat in the blood. The body needs cholesterol in small amounts, but too much cholesterol can cause damage to the arteries and heart.  What foods have cholesterol?    Cholesterol is found in animal-based foods, such as meat, seafood, and dairy. Generally, low-fat dairy and lean meats have less cholesterol than full-fat dairy and fatty meats. The milligrams of cholesterol per serving (mg per serving) of common cholesterol-containing foods are listed below.  Meats and other proteins  Egg -- one large whole egg has 186 mg.  Veal shank -- 4 oz (113 g) has 141 mg.  Lean ground turkey (93% lean) -- 4 oz (113 g) has 118 mg.  Fat-trimmed lamb loin -- 4 oz (113 g) has 106 mg.  Lean ground beef (90% lean) -- 4 oz (113 g) has 100 mg.  Lobster -- 3.5 oz (99 g) has 90 mg.  Pork loin chops -- 4 oz (113 g) has 86 mg.  Canned salmon -- 3.5 oz (99 g) has 83 mg.  Fat-trimmed beef top loin -- 4 oz (113 g) has 78 mg.  Frankfurter -- 1 ramos (3.5 oz or 99 g) has 77 mg.  Crab -- 3.5 oz (99 g) has 71 mg.  Roasted chicken without skin, white meat -- 4 oz (113 g) has 66 mg.  Light bologna -- 2 oz (57 g) has 45 mg.  Deli-cut turkey -- 2 oz (57 g) has 31 mg.  Canned tuna -- 3.5 oz (99 g) has 31 mg.  Graff --  1 oz (28 g) has 29 mg.  Oysters and mussels (raw) -- 3.5 oz (99 g) has 25 mg.  Mackerel -- 1 oz (28 g) has 22 mg.  Trout -- 1 oz (28 g) has 20 mg.  Pork sausage -- 1 link (1 oz or 28 g) has 17 mg.  Tohatchi -- 1 oz (28 g) has 16 mg.  Tilapia -- 1 oz (28 g) has 14 mg.  Dairy  Soft-serve ice cream -- ½ cup (4 oz or 86 g) has 103 mg.  Whole-milk yogurt -- 1 cup (8 oz or 245 g) has 29 mg.  Cheddar cheese -- 1 oz (28 g) has 28 mg.  American cheese -- 1 oz (28 g) has 28 mg.  Whole milk -- 1 cup (8 oz or 250 mL) has 23 mg.  2% milk -- 1 cup (8 oz or 250 mL) has 18 mg.  Cream cheese -- 1 tablespoon (Tbsp) (14.5 g) has 15 mg.  Cottage cheese -- ½ cup (4 oz or 113 g) has 14 mg.  Low-fat (1%) milk -- 1 cup (8 oz or 250 mL) has 10 mg.  Sour cream -- 1 Tbsp (12 g) has 8.5 mg.  Low-fat yogurt -- 1 cup (8 oz or 245 g) has 8 mg.  Nonfat Greek yogurt -- 1 cup (8 oz or 228 g) has 7 mg.  Half-and-half cream -- 1 Tbsp (15 mL) has 5 mg.  Fats and oils  Cod liver oil -- 1 tablespoon (Tbsp) (13.6 g) has 82 mg.  Butter -- 1 Tbsp (14 g) has 15 mg.  Lard -- 1 Tbsp (12.8 g) has 14 mg.  Azevedo grease -- 1 Tbsp (12.9 g) has 14 mg.  Mayonnaise -- 1 Tbsp (13.8 g) has 5-10 mg.  Margarine -- 1 Tbsp (14 g) has 3-10 mg.  The items listed above may not be a complete list of foods with cholesterol. Exact amounts of cholesterol in these foods may vary depending on specific ingredients and brands. Contact a dietitian for more information.  What foods do not have cholesterol?  Most plant-based foods do not have cholesterol unless you combine them with a food that has cholesterol. Foods without cholesterol include:  Grains and cereals.  Vegetables.  Fruits.  Vegetable oils, such as olive, canola, and sunflower oil.  Legumes, such as peas, beans, and lentils.  Nuts and seeds.  Egg whites.  The items listed above may not be a complete list of foods that do not have cholesterol. Contact a dietitian for more information.  Summary  The body needs cholesterol in  small amounts, but too much cholesterol can cause damage to the arteries and heart.  Cholesterol is found in animal-based foods, such as meat, seafood, and dairy. Generally, low-fat dairy and lean meats have less cholesterol than full-fat dairy and fatty meats.  This information is not intended to replace advice given to you by your health care provider. Make sure you discuss any questions you have with your health care provider.  Document Revised: 04/29/2022 Document Reviewed: 04/29/2022  [employer] Patient Education © 2024 [employer]  Carbohydrate Counting for Diabetes Mellitus, Adult  Carbohydrate counting is a method of keeping track of how many carbohydrates you eat. Eating carbohydrates increases the amount of sugar (glucose) in the blood. Counting how many carbohydrates you eat improves how well you manage your blood glucose. This, in turn, helps you manage your diabetes.  Carbohydrates are measured in grams (g) per serving. It is important to know how many carbohydrates (in grams or by serving size) you can have in each meal. This is different for every person. A dietitian can help you make a meal plan and calculate how many carbohydrates you should have at each meal and snack.  What foods contain carbohydrates?  Carbohydrates are found in the following foods:  Grains, such as breads and cereals.  Dried beans and soy products.  Starchy vegetables, such as potatoes, peas, and corn.  Fruit and fruit juices.  Milk and yogurt.  Sweets and snack foods, such as cake, cookies, candy, chips, and soft drinks.  How do I count carbohydrates in foods?  There are two ways to count carbohydrates in food. You can read food labels or learn standard serving sizes of foods. You can use either of these methods or a combination of both.  Using the Nutrition Facts label  The Nutrition Facts list is included on the labels of almost all packaged foods and beverages in the [place]. It includes:  The serving  size.  Information about nutrients in each serving, including the grams of carbohydrate per serving.  To use the Nutrition Facts, decide how many servings you will have. Then, multiply the number of servings by the number of carbohydrates per serving. The resulting number is the total grams of carbohydrates that you will be having.  Learning the standard serving sizes of foods  When you eat carbohydrate foods that are not packaged or do not include Nutrition Facts on the label, you need to measure the servings in order to count the grams of carbohydrates.  Measure the foods that you will eat with a food scale or measuring cup, if needed.  Decide how many standard-size servings you will eat.  Multiply the number of servings by 15. For foods that contain carbohydrates, one serving equals 15 g of carbohydrates.  For example, if you eat 2 cups or 10 oz (300 g) of strawberries, you will have eaten 2 servings and 30 g of carbohydrates (2 servings x 15 g = 30 g).  For foods that have more than one food mixed, such as soups and casseroles, you must count the carbohydrates in each food that is included.  The following list contains standard serving sizes of common carbohydrate-rich foods. Each of these servings has about 15 g of carbohydrates:  1 slice of bread.  1 six-inch (15 cm) tortilla.  ? cup or 2 oz (53 g) cooked rice or pasta.  ½ cup or 3 oz (85 g) cooked or canned, drained and rinsed beans or lentils.  ½ cup or 3 oz (85 g) starchy vegetable, such as peas, corn, or squash.  ½ cup or 4 oz (120 g) hot cereal.  ½ cup or 3 oz (85 g) boiled or mashed potatoes, or ¼ or 3 oz (85 g) of a large baked potato.  ½ cup or 4 fl oz (118 mL) fruit juice.  1 cup or 8 fl oz (237 mL) milk.  1 small or 4 oz (106 g) apple.  ½ or 2 oz (63 g) of a medium banana.  1 cup or 5 oz (150 g) strawberries.  3 cups or 1 oz (28.3 g) popped popcorn.  What is an example of carbohydrate counting?  To calculate the grams of carbohydrates in this sample  meal, follow the steps shown below.  Sample meal  3 oz (85 g) chicken breast.  ? cup or 4 oz (106 g) brown rice.  ½ cup or 3 oz (85 g) corn.  1 cup or 8 fl oz (237 mL) milk.  1 cup or 5 oz (150 g) strawberries with sugar-free whipped topping.  Carbohydrate calculation  Identify the foods that contain carbohydrates:  Rice.  Corn.  Milk.  Strawberries.  Calculate how many servings you have of each food:  2 servings rice.  1 serving corn.  1 serving milk.  1 serving strawberries.  Multiply each number of servings by 15  servings rice x 15 g = 30 g.  1 serving corn x 15 g = 15 g.  1 serving milk x 15 g = 15 g.  1 serving strawberries x 15 g = 15 g.  Add together all of the amounts to find the total grams of carbohydrates eaten:  30 g + 15 g + 15 g + 15 g = 75 g of carbohydrates total.  What are tips for following this plan?  Shopping  Develop a meal plan and then make a shopping list.  Buy fresh and frozen vegetables, fresh and frozen fruit, dairy, eggs, beans, lentils, and whole grains.  Look at food labels. Choose foods that have more fiber and less sugar.  Avoid processed foods and foods with added sugars.  Meal planning  Aim to have the same number of grams of carbohydrates at each meal and for each snack time.  Plan to have regular, balanced meals and snacks.  Where to find more information  American Diabetes Association: diabetes.org  Centers for Disease Control and Prevention: cdc.gov  Academy of Nutrition and Dietetics: eatright.org  Association of Diabetes Care & Education Specialists: diabeteseducator.org  Summary  Carbohydrate counting is a method of keeping track of how many carbohydrates you eat.  Eating carbohydrates increases the amount of sugar (glucose) in your blood.  Counting how many carbohydrates you eat improves how well you manage your blood glucose. This helps you manage your diabetes.  A dietitian can help you make a meal plan and calculate how many carbohydrates you should have at each  meal and snack.  This information is not intended to replace advice given to you by your health care provider. Make sure you discuss any questions you have with your health care provider.  Document Revised: 07/21/2021 Document Reviewed: 07/21/2021  Elsevier Patient Education © 2024 Elsevier Inc.

## 2025-01-14 DIAGNOSIS — E11.9 TYPE 2 DIABETES MELLITUS WITHOUT COMPLICATION, WITHOUT LONG-TERM CURRENT USE OF INSULIN: ICD-10-CM

## 2025-01-15 RX ORDER — ORAL SEMAGLUTIDE 14 MG/1
1 TABLET ORAL
Qty: 30 TABLET | Refills: 0 | OUTPATIENT
Start: 2025-01-15

## 2025-03-17 ENCOUNTER — OFFICE VISIT (OUTPATIENT)
Dept: FAMILY MEDICINE CLINIC | Facility: CLINIC | Age: 60
End: 2025-03-17
Payer: COMMERCIAL

## 2025-03-17 VITALS
HEART RATE: 74 BPM | SYSTOLIC BLOOD PRESSURE: 120 MMHG | OXYGEN SATURATION: 99 % | BODY MASS INDEX: 39.89 KG/M2 | DIASTOLIC BLOOD PRESSURE: 84 MMHG | HEIGHT: 69 IN | WEIGHT: 269.3 LBS

## 2025-03-17 DIAGNOSIS — E78.49 OTHER HYPERLIPIDEMIA: ICD-10-CM

## 2025-03-17 DIAGNOSIS — Z53.21 PATIENT LEFT WITHOUT BEING SEEN: Primary | ICD-10-CM

## 2025-03-17 DIAGNOSIS — E03.9 ACQUIRED HYPOTHYROIDISM: ICD-10-CM

## 2025-03-17 DIAGNOSIS — E11.9 TYPE 2 DIABETES MELLITUS WITHOUT COMPLICATION, WITHOUT LONG-TERM CURRENT USE OF INSULIN: ICD-10-CM

## 2025-03-17 DIAGNOSIS — N52.9 VASCULOGENIC ERECTILE DYSFUNCTION, UNSPECIFIED VASCULOGENIC ERECTILE DYSFUNCTION TYPE: ICD-10-CM

## 2025-03-17 DIAGNOSIS — I10 ESSENTIAL HYPERTENSION: ICD-10-CM

## 2025-03-17 RX ORDER — LEVOTHYROXINE SODIUM 175 UG/1
175 TABLET ORAL
Qty: 30 TABLET | Refills: 8 | Status: CANCELLED | OUTPATIENT
Start: 2025-03-17 | End: 2025-12-12

## 2025-03-17 RX ORDER — AMLODIPINE BESYLATE 10 MG/1
10 TABLET ORAL DAILY
Qty: 30 TABLET | Refills: 8 | Status: CANCELLED | OUTPATIENT
Start: 2025-03-17 | End: 2025-12-12

## 2025-03-17 RX ORDER — PIOGLITAZONE 45 MG/1
45 TABLET ORAL DAILY
Qty: 30 TABLET | Refills: 8 | Status: CANCELLED | OUTPATIENT
Start: 2025-03-17 | End: 2025-12-12

## 2025-03-17 RX ORDER — ORAL SEMAGLUTIDE 14 MG/1
1 TABLET ORAL
Qty: 30 TABLET | Refills: 8 | Status: CANCELLED | OUTPATIENT
Start: 2025-03-17 | End: 2025-12-12

## 2025-03-17 RX ORDER — METFORMIN HYDROCHLORIDE 500 MG/1
2000 TABLET, EXTENDED RELEASE ORAL
Qty: 120 TABLET | Refills: 8 | Status: CANCELLED | OUTPATIENT
Start: 2025-03-17 | End: 2025-12-12

## 2025-03-17 RX ORDER — EZETIMIBE 10 MG/1
10 TABLET ORAL DAILY
Qty: 30 TABLET | Refills: 8 | Status: CANCELLED | OUTPATIENT
Start: 2025-03-17 | End: 2025-12-12

## 2025-03-17 RX ORDER — SILDENAFIL CITRATE 20 MG/1
TABLET ORAL
Qty: 50 TABLET | Refills: 8 | Status: CANCELLED | OUTPATIENT
Start: 2025-03-17

## 2025-03-17 RX ORDER — ATORVASTATIN CALCIUM 40 MG/1
40 TABLET, FILM COATED ORAL DAILY
Qty: 30 TABLET | Refills: 8 | Status: CANCELLED | OUTPATIENT
Start: 2025-03-17 | End: 2025-12-12

## 2025-03-17 RX ORDER — IRBESARTAN 300 MG/1
300 TABLET ORAL DAILY
Qty: 30 TABLET | Refills: 8 | Status: CANCELLED | OUTPATIENT
Start: 2025-03-17 | End: 2025-12-12

## 2025-03-26 ENCOUNTER — OFFICE VISIT (OUTPATIENT)
Dept: FAMILY MEDICINE CLINIC | Facility: CLINIC | Age: 60
End: 2025-03-26
Payer: COMMERCIAL

## 2025-03-26 VITALS
SYSTOLIC BLOOD PRESSURE: 124 MMHG | WEIGHT: 265.7 LBS | HEART RATE: 88 BPM | OXYGEN SATURATION: 98 % | HEIGHT: 69 IN | BODY MASS INDEX: 39.35 KG/M2 | DIASTOLIC BLOOD PRESSURE: 72 MMHG

## 2025-03-26 DIAGNOSIS — N52.9 VASCULOGENIC ERECTILE DYSFUNCTION, UNSPECIFIED VASCULOGENIC ERECTILE DYSFUNCTION TYPE: ICD-10-CM

## 2025-03-26 DIAGNOSIS — E78.49 OTHER HYPERLIPIDEMIA: ICD-10-CM

## 2025-03-26 DIAGNOSIS — I10 ESSENTIAL HYPERTENSION: Primary | ICD-10-CM

## 2025-03-26 DIAGNOSIS — E83.52 SERUM CALCIUM ELEVATED: ICD-10-CM

## 2025-03-26 DIAGNOSIS — E03.9 ACQUIRED HYPOTHYROIDISM: ICD-10-CM

## 2025-03-26 DIAGNOSIS — E11.9 TYPE 2 DIABETES MELLITUS WITHOUT COMPLICATION, WITHOUT LONG-TERM CURRENT USE OF INSULIN: ICD-10-CM

## 2025-03-26 PROBLEM — E66.812 CLASS 2 SEVERE OBESITY DUE TO EXCESS CALORIES WITH SERIOUS COMORBIDITY AND BODY MASS INDEX (BMI) OF 39.0 TO 39.9 IN ADULT: Status: ACTIVE | Noted: 2023-02-13

## 2025-03-26 RX ORDER — PIOGLITAZONE 45 MG/1
45 TABLET ORAL DAILY
Qty: 30 TABLET | Refills: 8 | Status: SHIPPED | OUTPATIENT
Start: 2025-03-26 | End: 2025-12-21

## 2025-03-26 RX ORDER — METFORMIN HYDROCHLORIDE 500 MG/1
2000 TABLET, EXTENDED RELEASE ORAL
Qty: 120 TABLET | Refills: 8 | Status: SHIPPED | OUTPATIENT
Start: 2025-03-26 | End: 2025-12-21

## 2025-03-26 RX ORDER — AMLODIPINE BESYLATE 10 MG/1
10 TABLET ORAL DAILY
Qty: 30 TABLET | Refills: 8 | Status: SHIPPED | OUTPATIENT
Start: 2025-03-26 | End: 2025-12-21

## 2025-03-26 RX ORDER — ORAL SEMAGLUTIDE 14 MG/1
1 TABLET ORAL
Qty: 30 TABLET | Refills: 8 | Status: SHIPPED | OUTPATIENT
Start: 2025-03-26 | End: 2025-12-21

## 2025-03-26 RX ORDER — IRBESARTAN 300 MG/1
300 TABLET ORAL DAILY
Qty: 30 TABLET | Refills: 8 | Status: SHIPPED | OUTPATIENT
Start: 2025-03-26 | End: 2025-12-21

## 2025-03-26 RX ORDER — LEVOTHYROXINE SODIUM 150 UG/1
150 TABLET ORAL
Qty: 30 TABLET | Refills: 8 | Status: SHIPPED | OUTPATIENT
Start: 2025-03-26 | End: 2025-12-21

## 2025-03-26 RX ORDER — ATORVASTATIN CALCIUM 40 MG/1
40 TABLET, FILM COATED ORAL DAILY
Qty: 30 TABLET | Refills: 8 | Status: SHIPPED | OUTPATIENT
Start: 2025-03-26 | End: 2025-12-21

## 2025-03-26 RX ORDER — EZETIMIBE 10 MG/1
10 TABLET ORAL DAILY
Qty: 30 TABLET | Refills: 8 | Status: SHIPPED | OUTPATIENT
Start: 2025-03-26 | End: 2025-12-21

## 2025-03-26 RX ORDER — SILDENAFIL CITRATE 20 MG/1
TABLET ORAL
Qty: 50 TABLET | Refills: 8 | Status: SHIPPED | OUTPATIENT
Start: 2025-03-26

## 2025-03-26 NOTE — ASSESSMENT & PLAN NOTE
The current medical regimen is effective;  continue present plan and medications.    Orders:    atorvastatin (LIPITOR) 40 MG tablet; Take 1 tablet by mouth Daily for 270 days.    ezetimibe (ZETIA) 10 MG tablet; Take 1 tablet by mouth Daily for 270 days.    Lipid Panel; Future

## 2025-03-26 NOTE — ASSESSMENT & PLAN NOTE
Interval improvement in type 2 diabetes.  Continue same therapy.  Continue with healthy diet and exercise.  Continue with weight loss.  Orders:    metFORMIN ER (GLUCOPHAGE-XR) 500 MG 24 hr tablet; Take 4 tablets by mouth Daily With Breakfast for 270 days.    pioglitazone (Actos) 45 MG tablet; Take 1 tablet by mouth Daily for 270 days.    Rybelsus 14 MG tablet; Take 1 tablet by mouth Every Morning Before Breakfast for 270 days.    Comprehensive Metabolic Panel; Future    Hemoglobin A1c; Future    Microalbumin / Creatinine Urine Ratio - Urine, Clean Catch; Future

## 2025-03-26 NOTE — PATIENT INSTRUCTIONS
Mediterranean Diet  A Mediterranean diet is based on the traditions of countries on the [place]. It focuses on eating more:  Fruits and vegetables.  Whole grains, beans, nuts, and seeds.  Heart-healthy fats. These are fats that are good for your heart.  It involves eating less:  Dairy.  Meat and eggs.  Processed foods with added sugar, salt, and fat.  This type of diet can help prevent certain conditions. It can also improve outcomes if you have a long-term (chronic) disease, such as kidney or heart disease.  What are tips for following this plan?  Reading food labels  Check packaged foods for:  The serving size. For foods such as rice and pasta, the serving size is the amount of cooked product, not dry.  The total fat. Avoid foods with saturated fat or trans fat.  Added sugars, such as corn syrup.  Shopping    Try to have a balanced diet. Buy a variety of foods, such as:  Fresh fruits and vegetables. You may be able to get these from local [employer] markets. You can also buy them frozen.  Grains, beans, nuts, and seeds. Some of these can be bought in bulk.  Fresh seafood.  Poultry and eggs.  Low-fat dairy products.  Buy whole ingredients instead of foods that have already been packaged.  If you can't get fresh seafood, buy precooked frozen shrimp or canned fish, such as tuna, salmon, or sardines.  Stock your pantry so you always have certain foods on hand, such as olive oil, canned tuna, canned tomatoes, rice, pasta, and beans.  Cooking  Cook foods with extra-virgin olive oil instead of using butter or other vegetable oils.  Have meat as a side dish. Have vegetables or grains as your main dish. This means having meat in small portions or adding small amounts of meat to foods like pasta or stew.  Use beans or vegetables instead of meat in common dishes like chili or lasagna.  Try out different cooking methods. Try roasting, broiling, steaming, and sautéing vegetables.  Add frozen vegetables to soups, stews,  pasta, or rice.  Add nuts or seeds for added healthy fats and plant protein at each meal. You can add these to yogurt, salads, or vegetable dishes.  Marinate fish or vegetables using olive oil, lemon juice, garlic, and fresh herbs.  Meal planning  Plan to eat a vegetarian meal one day each week. Try to work up to two vegetarian meals, if possible.  Eat seafood two or more times a week.  Have healthy snacks on hand. These may include:  Vegetable sticks with hummus.  Greek yogurt.  Fruit and nut trail mix.  Eat balanced meals. These should include:  Fruit: 2-3 servings a day.  Vegetables: 4-5 servings a day.  Low-fat dairy: 2 servings a day.  Fish, poultry, or lean meat: 1 serving a day.  Beans and legumes: 2 or more servings a week.  Nuts and seeds: 1-2 servings a day.  Whole grains: 6-8 servings a day.  Extra-virgin olive oil: 3-4 servings a day.  Limit red meat and sweets to just a few servings a month.  Lifestyle    Try to cook and eat meals with your family.  Drink enough fluid to keep your pee (urine) pale yellow.  Be active every day. This includes:  Aerobic exercise, which is exercise that causes your heart to beat faster. Examples include running and swimming.  Leisure activities like gardening, walking, or housework.  Get 7-8 hours of sleep each night.  Drink red wine if your provider says you can. A glass of wine is 5 oz (150 mL). You may be allowed to have:  Up to 1 glass a day if you're female and not pregnant.  Up to 2 glasses a day if you're male.  What foods should I eat?  Fruits  Apples. Apricots. Avocado. Berries. Bananas. Cherries. Dates. Figs. Grapes. Allie. Melon. Oranges. Peaches. Plums. Pomegranate.  Vegetables  Artichokes. Beets. Broccoli. Cabbage. Carrots. Eggplant. Green beans. Chard. Kale. Spinach. Onions. Leeks. Peas. Squash. Tomatoes. Peppers. Radishes.  Grains  Whole-grain pasta. Brown rice. Bulgur wheat. Polenta. Couscous. Whole-wheat bread. Oatmeal. Quinoa.  Meats and other  proteins  Beans. Almonds. Sunflower seeds. Pine nuts. Peanuts. Cod. Cambria. Scallops. Shrimp. Tuna. Tilapia. Clams. Oysters. Eggs. Chicken or turkey without skin.  Dairy  Low-fat milk. Cheese. Greek yogurt.  Fats and oils  Extra-virgin olive oil. Avocado oil. Grapeseed oil.  Beverages  Water. Red wine. Herbal tea.  Sweets and desserts  Greek yogurt with honey. Baked apples. Poached pears. Trail mix.  Seasonings and condiments  Basil. Cilantro. Coriander. Cumin. Mint. Parsley. Parth. Rosemary. Tarragon. Garlic. Oregano. Thyme. Pepper. Balsamic vinegar. Tahini. Hummus. Tomato sauce. Olives. Mushrooms.  The items listed above may not be all the foods and drinks you can have. Talk to a dietitian to learn more.  What foods should I limit?  This is a list of foods that should be eaten rarely.  Fruits  Fruit canned in syrup.  Vegetables  Deep-fried potatoes, like French fries.  Grains  Packaged pasta or rice dishes. Cereal with added sugar. Snacks with added sugar.  Meats and other proteins  Beef. Pork. Lamb. Chicken or turkey with skin. Hot dogs. Azevedo.  Dairy  Ice cream. Sour cream. Whole milk.  Fats and oils  Butter. Canola oil. Vegetable oil. Beef fat (tallow). Lard.  Beverages  Juice. Sugar-sweetened soft drinks. Beer. Liquor and spirits.  Sweets and desserts  Cookies. Cakes. Pies. Candy.  Seasonings and condiments  Mayonnaise. Pre-made sauces and marinades.  The items listed above may not be all the foods and drinks you should limit. Talk to a dietitian to learn more.  Where to find more information  American Heart Association (AHA): heart.org  This information is not intended to replace advice given to you by your health care provider. Make sure you discuss any questions you have with your health care provider.  Document Revised: 03/31/2024 Document Reviewed: 03/31/2024  [employer] Patient Education © 2024 [employer] Exercising to Lose Weight  Getting regular exercise is important for everyone. It is especially  important if you are overweight. Being overweight increases your risk of heart disease, stroke, diabetes, high blood pressure, and several types of cancer. Exercising, and reducing the calories you consume, can help you lose weight and improve fitness and health.  Exercise can be moderate or vigorous intensity. To lose weight, most people need to do a certain amount of moderate or vigorous-intensity exercise each week.  How can exercise affect me?  You lose weight when you exercise enough to burn more calories than you eat. Exercise also reduces body fat and builds muscle. The more muscle you have, the more calories you burn. Exercise also:  Improves mood.  Reduces stress and tension.  Improves your overall fitness, flexibility, and endurance.  Increases bone strength.  Moderate-intensity exercise    Moderate-intensity exercise is any activity that gets you moving enough to burn at least three times more energy (calories) than if you were sitting.  Examples of moderate exercise include:  Walking a mile in 15 minutes.  Doing light yard work.  Biking at an easy pace.  Most people should get at least 150 minutes of moderate-intensity exercise a week to maintain their body weight.  Vigorous-intensity exercise  Vigorous-intensity exercise is any activity that gets you moving enough to burn at least six times more calories than if you were sitting. When you exercise at this intensity, you should be working hard enough that you are not able to carry on a conversation.  Examples of vigorous exercise include:  Running.  Playing a team sport, such as football, basketball, and soccer.  Jumping rope.  Most people should get at least 75 minutes a week of vigorous exercise to maintain their body weight.  What actions can I take to lose weight?  The amount of exercise you need to lose weight depends on:  Your age.  The type of exercise.  Any health conditions you have.  Your overall physical ability.  Talk to your health care  provider about how much exercise you need and what types of activities are safe for you.  Nutrition    Make changes to your diet as told by your health care provider or diet and nutrition specialist (dietitian). This may include:  Eating fewer calories.  Eating more protein.  Eating less unhealthy fats.  Eating a diet that includes fresh fruits and vegetables, whole grains, low-fat dairy products, and lean protein.  Avoiding foods with added fat, salt, and sugar.  Drink plenty of water while you exercise to prevent dehydration or heat stroke.  Activity  Choose an activity that you enjoy and set realistic goals. Your health care provider can help you make an exercise plan that works for you.  Exercise at a moderate or vigorous intensity most days of the week.  The intensity of exercise may vary from person to person. You can tell how intense a workout is for you by paying attention to your breathing and heartbeat. Most people will notice their breathing and heartbeat get faster with more intense exercise.  Do resistance training twice each week, such as:  Push-ups.  Sit-ups.  Lifting weights.  Using resistance bands.  Getting short amounts of exercise can be just as helpful as long, structured periods of exercise. If you have trouble finding time to exercise, try doing these things as part of your daily routine:  Get up, stretch, and walk around every 30 minutes throughout the day.  Go for a walk during your lunch break.  Park your car farther away from your destination.  If you take public transportation, get off one stop early and walk the rest of the way.  Make phone calls while standing up and walking around.  Take the stairs instead of elevators or escalators.  Wear comfortable clothes and shoes with good support.  Do not exercise so much that you hurt yourself, feel dizzy, or get very short of breath.  Where to find more information  [place] Department of Health and Human Services: www.hhs.gov  Centers for Disease  Control and Prevention: www.cdc.gov  Contact a health care provider:  Before starting a new exercise program.  If you have questions or concerns about your weight.  If you have a medical problem that keeps you from exercising.  Get help right away if:  You have any of the following while exercising:  Injury.  Dizziness.  Difficulty breathing or shortness of breath that does not go away when you stop exercising.  Chest pain.  Rapid heartbeat.  These symptoms may represent a serious problem that is an emergency. Do not wait to see if the symptoms will go away. Get medical help right away. Call your local emergency services (911 in the [place] Do not drive yourself to the hospital.  Summary  Getting regular exercise is especially important if you are overweight.  Being overweight increases your risk of heart disease, stroke, diabetes, high blood pressure, and several types of cancer.  Losing weight happens when you burn more calories than you eat.  Reducing the amount of calories you eat, and getting regular moderate or vigorous exercise each week, helps you lose weight.  This information is not intended to replace advice given to you by your health care provider. Make sure you discuss any questions you have with your health care provider.  Document Revised: 02/13/2022 Document Reviewed: 02/13/2022  [employer]vier Patient Education © 2024 [employer]

## 2025-03-26 NOTE — ASSESSMENT & PLAN NOTE
The current medical regimen is effective;  continue present plan and medications.      Orders:    amLODIPine (NORVASC) 10 MG tablet; Take 1 tablet by mouth Daily for 270 days.    irbesartan (AVAPRO) 300 MG tablet; Take 1 tablet by mouth Daily for 270 days.    Comprehensive Metabolic Panel; Future    CBC & Differential; Future

## 2025-03-26 NOTE — PROGRESS NOTES
"Chief Complaint  Follow-up (6 month), Diabetes, Hyperlipidemia, Hypertension, BPH with nocturia, Hypothyroidism, and Obesity    Subjective        HPI      The patient presents for evaluation of diabetes, elevated calcium, thyroid management, erectile dysfunction, and health maintenance.    He has been actively managing his weight, resulting in a significant loss of approximately 20 pounds since the beginning of the year. His current regimen includes amlodipine and irbesartan for blood pressure management; atorvastatin and Zetia for cholesterol control; metformin (4 tablets daily), pioglitazone 45 mg, and Rybelsus 14 mg for diabetes management. He is also on a high dose of levothyroxine.    He has been maintaining regular ophthalmological check-ups, with the most recent one indicating stable visual acuity. There is no evidence of diabetic retinopathy.    He reports a dietary change of consuming honey since the start of the year and has limited his alcohol intake to one beer per week. He has not been supplementing with calcium or antacids, although he admits to occasional use of Tums since the start of the year.    He requests a refill of sildenafil for his erectile dysfunction.    SOCIAL HISTORY  - Alcohol consumption: beer once a day, one day a week    MEDICATIONS  - Current medications:    - Amlodipine    - Irbesartan    - Atorvastatin    - Zetia    - Levothyroxine    - Metformin    - Pioglitazone    - Rybelsus    - Sildenafil           Vital Signs:   Vitals:    03/26/25 0834   BP: 124/72   Pulse: 88   SpO2: 98%   Weight: 121 kg (265 lb 11.2 oz)   Height: 175.3 cm (69\")            3/17/2025     8:27 AM   PHQ-2/PHQ-9 Depression Screening   Little interest or pleasure in doing things Not at all   Feeling down, depressed, or hopeless Not at all                 Physical Exam     General Appearance: No Acute Distress, normal appearance, well developed, well nourished.  Vital signs: Blood pressure is 124/72. Weight is " 265 pounds. BMI is 39.24.  Respiratory: lungs clear to auscultation, no wheezes, normal respiratory effort.  Cardiovascular: Regular rate and rhythm with no murmurs, rubs, or gallop. No carotid bruits auscultated bilaterally.  Extremities: No pretibial edema, bilaterally.  Skin: Warm and dry, no rash.  Psychiatric: patient cooperative, normal affect.       The following data was reviewed by: Elliott Lopez MD on 03/26/2025:      Results  - Laboratory Studies:    - Sodium: 141    - Potassium: 5    - Chloride: 106    - Calcium: 10.3    - BUN: 15    - Creatinine: 1.04    - Glucose: 98    - Hemoglobin A1c: 6.4    - Total cholesterol: 116    - HDL: 34    - LDL: 60    - Triglycerides: 121    - White blood cell count: 7.1    - Hemoglobin: 15    - Microalbumin in urine: 15                Assessment and Plan      1. Diabetes Mellitus: Controlled. Hemoglobin A1c improved from 7.8 to 6.4.  - Continue current medication regimen: metformin (4 tablets daily), pioglitazone 45 mg, and Rybelsus 14 mg  - Maintain regular follow-ups with eye doctor to monitor for diabetic retinopathy    2. Elevated Calcium Levels: Fluctuating.  - Add ionized calcium test to next set of labs  - Conduct parathyroid hormone test if abnormalities are detected    3. Thyroid Management: Slightly abnormal.  - Reduce levothyroxine dosage from 175 mcg to 150 mcg  - Monitor for symptoms of hypothyroidism and report if they occur    4. Erectile Dysfunction.  - Provide refill for sildenafil  - Instruct to take 3 to 5 tablets as needed before sexual activity  - Send prescription to [facility] in [place]    5. Health Maintenance.  - Continue weight loss efforts; lost almost 20 pounds since the beginning of the year  - Maintain healthy lifestyle  - Blood pressure well-controlled at 124/72 mmHg  - Continue amlodipine and irbesartan for blood pressure management  - Continue atorvastatin and Zetia for cholesterol management    Follow-up  - Follow up in 6 months for an  adult wellness check         Essential hypertension    The current medical regimen is effective;  continue present plan and medications.      Orders:    amLODIPine (NORVASC) 10 MG tablet; Take 1 tablet by mouth Daily for 270 days.    irbesartan (AVAPRO) 300 MG tablet; Take 1 tablet by mouth Daily for 270 days.    Comprehensive Metabolic Panel; Future    CBC & Differential; Future    Other hyperlipidemia     The current medical regimen is effective;  continue present plan and medications.    Orders:    atorvastatin (LIPITOR) 40 MG tablet; Take 1 tablet by mouth Daily for 270 days.    ezetimibe (ZETIA) 10 MG tablet; Take 1 tablet by mouth Daily for 270 days.    Lipid Panel; Future    Acquired hypothyroidism  Change thyroid dose to 150 mcg daily.  Instructed patient to take this medicine 30 to 60 minutes prior to breakfast each morning.  Orders:    levothyroxine (SYNTHROID, LEVOTHROID) 150 MCG tablet; Take 1 tablet by mouth Every Morning for 270 days.    TSH; Future    Type 2 diabetes mellitus without complication, without long-term current use of insulin    Interval improvement in type 2 diabetes.  Continue same therapy.  Continue with healthy diet and exercise.  Continue with weight loss.  Orders:    metFORMIN ER (GLUCOPHAGE-XR) 500 MG 24 hr tablet; Take 4 tablets by mouth Daily With Breakfast for 270 days.    pioglitazone (Actos) 45 MG tablet; Take 1 tablet by mouth Daily for 270 days.    Rybelsus 14 MG tablet; Take 1 tablet by mouth Every Morning Before Breakfast for 270 days.    Comprehensive Metabolic Panel; Future    Hemoglobin A1c; Future    Microalbumin / Creatinine Urine Ratio - Urine, Clean Catch; Future    Vasculogenic erectile dysfunction, unspecified vasculogenic erectile dysfunction type  The current medical regimen is effective;  continue present plan and medications.    Orders:    sildenafil (REVATIO) 20 MG tablet; Take 3-5 tablets  po 60 minutes prior to sexual activity with a light meal as  needed    Serum calcium elevated  Intermittent elevation of serum calcium once again identified.  Check ionized calcium with next routine set of labs.  Orders:    Calcium, Ionized; Future       Return in about 6 months (around 9/26/2025) for Adult wellness & medication appt, schedule 30 min.     Patient was given instructions and counseling regarding his condition or for health maintenance advice. Please see specific information pulled into the AVS if appropriate.     Patient or patient representative verbalized consent for the use of Ambient Listening during the visit with  Elliott Lopez MD for chart documentation. 3/26/2025  09:12 EDT

## 2025-03-26 NOTE — ASSESSMENT & PLAN NOTE
The current medical regimen is effective;  continue present plan and medications.    Orders:    sildenafil (REVATIO) 20 MG tablet; Take 3-5 tablets  po 60 minutes prior to sexual activity with a light meal as needed

## 2025-03-26 NOTE — ASSESSMENT & PLAN NOTE
Change thyroid dose to 150 mcg daily.  Instructed patient to take this medicine 30 to 60 minutes prior to breakfast each morning.  Orders:    levothyroxine (SYNTHROID, LEVOTHROID) 150 MCG tablet; Take 1 tablet by mouth Every Morning for 270 days.    TSH; Future

## 2025-07-08 ENCOUNTER — PRIOR AUTHORIZATION (OUTPATIENT)
Dept: FAMILY MEDICINE CLINIC | Facility: CLINIC | Age: 60
End: 2025-07-08
Payer: COMMERCIAL

## 2025-07-08 ENCOUNTER — TELEPHONE (OUTPATIENT)
Dept: SURGERY | Facility: CLINIC | Age: 60
End: 2025-07-08

## 2025-07-08 ENCOUNTER — OFFICE VISIT (OUTPATIENT)
Dept: SURGERY | Facility: CLINIC | Age: 60
End: 2025-07-08
Payer: COMMERCIAL

## 2025-07-08 ENCOUNTER — OFFICE VISIT (OUTPATIENT)
Dept: FAMILY MEDICINE CLINIC | Facility: CLINIC | Age: 60
End: 2025-07-08
Payer: COMMERCIAL

## 2025-07-08 ENCOUNTER — HOSPITAL ENCOUNTER (EMERGENCY)
Facility: HOSPITAL | Age: 60
Discharge: HOME | End: 2025-07-08
Attending: EMERGENCY MEDICINE
Payer: COMMERCIAL

## 2025-07-08 VITALS
RESPIRATION RATE: 18 BRPM | OXYGEN SATURATION: 97 % | HEART RATE: 89 BPM | HEIGHT: 69 IN | TEMPERATURE: 97.9 F | BODY MASS INDEX: 35.4 KG/M2 | DIASTOLIC BLOOD PRESSURE: 93 MMHG | SYSTOLIC BLOOD PRESSURE: 146 MMHG | WEIGHT: 239 LBS

## 2025-07-08 VITALS
OXYGEN SATURATION: 97 % | RESPIRATION RATE: 16 BRPM | TEMPERATURE: 97.8 F | SYSTOLIC BLOOD PRESSURE: 140 MMHG | BODY MASS INDEX: 35.99 KG/M2 | HEART RATE: 76 BPM | DIASTOLIC BLOOD PRESSURE: 84 MMHG | WEIGHT: 243 LBS | HEIGHT: 69 IN

## 2025-07-08 VITALS
OXYGEN SATURATION: 97 % | HEART RATE: 87 BPM | SYSTOLIC BLOOD PRESSURE: 136 MMHG | TEMPERATURE: 98 F | WEIGHT: 242 LBS | BODY MASS INDEX: 35.84 KG/M2 | DIASTOLIC BLOOD PRESSURE: 70 MMHG | HEIGHT: 69 IN

## 2025-07-08 DIAGNOSIS — K64.9 BLEEDING HEMORRHOID: Primary | ICD-10-CM

## 2025-07-08 DIAGNOSIS — K64.5 THROMBOSED HEMORRHOIDS: Primary | ICD-10-CM

## 2025-07-08 PROCEDURE — 99282 EMERGENCY DEPT VISIT SF MDM: CPT

## 2025-07-08 PROCEDURE — 99213 OFFICE O/P EST LOW 20 MIN: CPT | Performed by: FAMILY MEDICINE

## 2025-07-08 PROCEDURE — 46320 REMOVAL OF HEMORRHOID CLOT: CPT | Performed by: STUDENT IN AN ORGANIZED HEALTH CARE EDUCATION/TRAINING PROGRAM

## 2025-07-08 PROCEDURE — 88304 TISSUE EXAM BY PATHOLOGIST: CPT | Performed by: STUDENT IN AN ORGANIZED HEALTH CARE EDUCATION/TRAINING PROGRAM

## 2025-07-08 PROCEDURE — 99203 OFFICE O/P NEW LOW 30 MIN: CPT | Performed by: STUDENT IN AN ORGANIZED HEALTH CARE EDUCATION/TRAINING PROGRAM

## 2025-07-08 PROCEDURE — 99024 POSTOP FOLLOW-UP VISIT: CPT | Performed by: STUDENT IN AN ORGANIZED HEALTH CARE EDUCATION/TRAINING PROGRAM

## 2025-07-08 PROCEDURE — 99283 EMERGENCY DEPT VISIT LOW MDM: CPT

## 2025-07-08 RX ORDER — OFLOXACIN 3 MG/ML
SOLUTION AURICULAR (OTIC)
COMMUNITY
Start: 2025-06-24
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
## 2025-07-08 NOTE — TELEPHONE ENCOUNTER
Spoke with patient again as he informed me that he would not be able to make it to our office prior to closing, so I recommended he go straight to the ER. During our conversation he clarified that he did saturate three ABD paids but that he switched to paper towels. He asked if he still needed to be evaluated and I instructed him that this was safest given his reported bleeding. I called the ER and instructed them to notify me once the patient arrives.    Shanna Villareal M.D.  General, Robotic, and Endoscopic Surgery  [facility]    [address], [address]  [place], [place], [address]  P: [phone]  F: [phone]

## 2025-07-08 NOTE — TELEPHONE ENCOUNTER
Patient called reporting bleeding from his hemorrhoid and stated he had gone through 10 pads, soaking 3 and having spotting on the rest. He denies any dizziness, lightheadedness, or any other symptoms except some perianal pain which he has treated with ibuprofen. I recommended he return to the office right away for evaluation for his bleeding. He is agreeable with the recommendations.    Shanna Villareal M.D.  General, Robotic, and Endoscopic Surgery  [facility]    [address], [address]  [place], [address]  P: [phone]  F: [phone]

## 2025-07-08 NOTE — PROGRESS NOTES
"Subjective   Hal Alexandre is a 59 y.o. male.     CC: Skin Nodule    History of Present Illness     Pt of Dr Lopez's comes to see me today reporting a 1 day h/o a \"lump\" that arose on the anal orifice overnight and is very painful. No prior h/o this.        The following portions of the patient's history were reviewed and updated as appropriate: allergies, current medications, past family history, past medical history, past social history, past surgical history, and problem list.    Review of Systems   Constitutional:  Negative for activity change, chills and fever.   Respiratory:  Negative for cough.    Cardiovascular:  Negative for chest pain.   Psychiatric/Behavioral:  Negative for dysphoric mood.        /84   Pulse 76   Temp 97.8 °F (36.6 °C) (Oral)   Resp 16   Ht 175.3 cm (69\")   Wt 110 kg (243 lb)   SpO2 97%   BMI 35.88 kg/m²     Objective   Physical Exam  Vitals and nursing note reviewed.   Constitutional:       General: He is not in acute distress.     Appearance: He is well-developed.   Pulmonary:      Effort: Pulmonary effort is normal.   Genitourinary:         Comments: Large thrombosed hemorrhoid noted  Neurological:      Mental Status: He is alert and oriented to person, place, and time.   Psychiatric:         Behavior: Behavior normal.         Thought Content: Thought content normal.         Assessment & Plan   Diagnoses and all orders for this visit:    1. Thrombosed hemorrhoids (Primary)  -     Ambulatory Referral to General Surgery    General Surgery is working him and now, thus patient sent right over to their office.             "
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
## 2025-07-09 ENCOUNTER — TELEPHONE (OUTPATIENT)
Dept: SURGERY | Facility: CLINIC | Age: 60
End: 2025-07-09
Payer: COMMERCIAL

## 2025-07-09 NOTE — TELEPHONE ENCOUNTER
Pt called this morning expressing concern that problem he was seen for previously as well as in the ED has returned and is causing him pain. Pt asked to be called back anytime today, please advise. Thank you

## 2025-07-09 NOTE — TELEPHONE ENCOUNTER
Spoke with patient who reports increased swelling from in the ER last night. Pain is better than prior to excision however, and he has had no further bleeding. He denies any other new symptoms. Recommend continued symptomatic treatment with NSAIDs, ice packs, sitz baths for pain and monitor. Call if new or worsening symptoms. Patient satisfied with recommendations and agreeable.    Shanna Villareal M.D.  General, Robotic, and Endoscopic Surgery  [facility]    [address], [address]  [place], [place], [address]  P: [phone]  F: [phone]

## 2025-07-10 LAB
CYTO UR: NORMAL
LAB AP CASE REPORT: NORMAL
LAB AP CLINICAL INFORMATION: NORMAL
PATH REPORT.FINAL DX SPEC: NORMAL
PATH REPORT.GROSS SPEC: NORMAL

## 2025-07-24 ENCOUNTER — OFFICE VISIT (OUTPATIENT)
Dept: SURGERY | Facility: CLINIC | Age: 60
End: 2025-07-24
Payer: COMMERCIAL

## 2025-07-24 VITALS
SYSTOLIC BLOOD PRESSURE: 140 MMHG | BODY MASS INDEX: 35.46 KG/M2 | HEIGHT: 69 IN | OXYGEN SATURATION: 95 % | WEIGHT: 239.4 LBS | DIASTOLIC BLOOD PRESSURE: 82 MMHG | HEART RATE: 81 BPM | TEMPERATURE: 98.3 F

## 2025-07-24 DIAGNOSIS — K64.5 THROMBOSED HEMORRHOIDS: Primary | ICD-10-CM
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
## (undated) DEVICE — APPL DURAPREP IODOPHOR APL 26ML

## (undated) DEVICE — DRSNG BURN ACTICOAT FLEX 7 1X24IN

## (undated) DEVICE — GLV SURG SENSICARE W/ALOE PF LF 8 STRL

## (undated) DEVICE — 3M™ IOBAN™ 2 ANTIMICROBIAL INCISE DRAPE 6640EZ: Brand: IOBAN™ 2

## (undated) DEVICE — THE TORRENT IRRIGATION SCOPE CONNECTOR IS USED WITH THE TORRENT IRRIGATION TUBING TO PROVIDE IRRIGATION FLUIDS SUCH AS STERILE WATER DURING GASTROINTESTINAL ENDOSCOPIC PROCEDURES WHEN USED IN CONJUNCTION WITH AN IRRIGATION PUMP (OR ELECTROSURGICAL UNIT).: Brand: TORRENT

## (undated) DEVICE — GLV SURG PREMIERPRO ORTHO LTX PF SZ7.5 BRN

## (undated) DEVICE — GENESIS II BICONVEX PATELLA 29MM
Type: IMPLANTABLE DEVICE | Site: KNEE | Status: NON-FUNCTIONAL
Brand: GENESIS II

## (undated) DEVICE — TUBING, SUCTION, 1/4" X 10', STRAIGHT: Brand: MEDLINE

## (undated) DEVICE — SPNG LAP 18X18IN LF STRL PK/5

## (undated) DEVICE — SUT VIC 0 CT1 36IN J946H

## (undated) DEVICE — TRAP FLD MINIVAC MEGADYNE 100ML

## (undated) DEVICE — MEDI-VAC YANKAUER SUCTION HANDLE W/BULBOUS TIP: Brand: CARDINAL HEALTH

## (undated) DEVICE — GLV SURG SENSICARE W/ALOE PF LF 7.5 STRL

## (undated) DEVICE — GLV SURG SENSICARE PI MIC PF SZ7 LF STRL

## (undated) DEVICE — SUT VIC 1 CT1 36IN J947H

## (undated) DEVICE — CANN NASL CO2 TRULINK W/O2 A/

## (undated) DEVICE — BNDG ELAS ELITE V/CLOSE 6IN 5YD LF STRL

## (undated) DEVICE — PREMIUM WET SKIN PREP TRAY: Brand: MEDLINE INDUSTRIES, INC.

## (undated) DEVICE — MAT FLR ABSORBENT LG 4FT 10 2.5FT

## (undated) DEVICE — GLV SURG SENSICARE PI PF LF 7 GRN STRL

## (undated) DEVICE — PK KN TOTL 40

## (undated) DEVICE — UNDERCAST PADDING: Brand: DEROYAL

## (undated) DEVICE — GENESIS TROCHLEAR PIN 1/8 X 3: Brand: GENESIS

## (undated) DEVICE — DUAL CUT SAGITTAL BLADE

## (undated) DEVICE — Device: Brand: DEFENDO AIR/WATER/SUCTION AND BIOPSY VALVE

## (undated) DEVICE — STPLR SKIN VISISTAT WD 35CT

## (undated) DEVICE — KT DRN EVAC WND PVC PCH WTROC RND 10F400

## (undated) DEVICE — PENCL E/S ULTRAVAC TELESCP NOSE HOLSTR 10FT

## (undated) DEVICE — STERILE PATIENT PROTECTIVE PAD FOR IMP® KNEE POSITIONERS & COHESIVE WRAP (10 / CASE): Brand: DE MAYO KNEE POSITIONER®

## (undated) DEVICE — SOL NACL 0.9PCT 100ML SGL

## (undated) DEVICE — PICO 7 10CM X 30CM: Brand: PICO™ 7

## (undated) DEVICE — PREP SOL POVIDONE/IODINE BT 4OZ

## (undated) DEVICE — NEEDLE, QUINCKE 22GX3.5": Brand: MEDLINE INDUSTRIES, INC.